# Patient Record
Sex: FEMALE | Race: WHITE | NOT HISPANIC OR LATINO | URBAN - METROPOLITAN AREA
[De-identification: names, ages, dates, MRNs, and addresses within clinical notes are randomized per-mention and may not be internally consistent; named-entity substitution may affect disease eponyms.]

---

## 2017-03-17 ENCOUNTER — INPATIENT (INPATIENT)
Facility: HOSPITAL | Age: 82
LOS: 4 days | Discharge: HOME | DRG: 552 | End: 2017-03-22
Attending: INTERNAL MEDICINE | Admitting: INTERNAL MEDICINE
Payer: COMMERCIAL

## 2017-03-17 VITALS
TEMPERATURE: 99 F | HEIGHT: 62 IN | HEART RATE: 73 BPM | WEIGHT: 173.94 LBS | SYSTOLIC BLOOD PRESSURE: 165 MMHG | OXYGEN SATURATION: 98 % | DIASTOLIC BLOOD PRESSURE: 42 MMHG | RESPIRATION RATE: 20 BRPM

## 2017-03-17 DIAGNOSIS — S72.009A FRACTURE OF UNSPECIFIED PART OF NECK OF UNSPECIFIED FEMUR, INITIAL ENCOUNTER FOR CLOSED FRACTURE: Chronic | ICD-10-CM

## 2017-03-17 RX ORDER — ACETAMINOPHEN WITH CODEINE 300MG-30MG
1 TABLET ORAL ONCE
Qty: 0 | Refills: 0 | Status: DISCONTINUED | OUTPATIENT
Start: 2017-03-17 | End: 2017-03-17

## 2017-03-17 RX ORDER — SODIUM CHLORIDE 9 MG/ML
3 INJECTION INTRAMUSCULAR; INTRAVENOUS; SUBCUTANEOUS EVERY 8 HOURS
Qty: 0 | Refills: 0 | Status: DISCONTINUED | OUTPATIENT
Start: 2017-03-17 | End: 2017-03-22

## 2017-03-17 RX ADMIN — SODIUM CHLORIDE 3 MILLILITER(S): 9 INJECTION INTRAMUSCULAR; INTRAVENOUS; SUBCUTANEOUS at 23:51

## 2017-03-17 RX ADMIN — Medication 1 TABLET(S): at 23:51

## 2017-03-17 NOTE — ED PROVIDER NOTE - MEDICAL DECISION MAKING DETAILS
Pt unable to ambulate safely due to back pain. Pt is fall risk. MAR endorsed. Pt and daughter  agrees with admission. Dr. Delarosa was paged. I had a detailed discussion with the patient and/or guardian regarding the historical points, exam findings, and any diagnostic results supporting the admit diagnosis.

## 2017-03-17 NOTE — ED PROVIDER NOTE - OBJECTIVE STATEMENT
Pt with daughter Ammy [place]an  Pt refused , requested  daughter as .   Pt has mechanicla fall 3 weeks ago down 1 step. no head trauma, no LOC.   +LBP with radiation to right leg. Pt is s/p lumbar sx 3 years ago. No urinary or bowel incontinence or retention.   Daughter states pt unable to safely ambulate. Pt with daughter Ammy Georgian  Pt refused , requested  daughter as .   Pt has mechanical fall 3 weeks ago down 1 step. no head trauma, no LOC.   +LBP with radiation to right leg. Pt is s/p lumbar sx 3 years ago. No urinary or bowel incontinence or retention.   Daughter states pt unable to safely ambulate.

## 2017-03-17 NOTE — ED PROVIDER NOTE - PHYSICAL EXAMINATION
+Right lower lumbar area paraspinal muscle tenseness and tenderness radiating to buttock and lateral calf area,,no cervical, thoracic or lumbar midline tenderness or bony deformities. No saddle anesthesia, B/L knee, pedal and EHL flex and ext intact, normal gait, no foot drop. Truncal flexion and extension intact.

## 2017-03-17 NOTE — ED PROVIDER NOTE - PMH
Anemia    Arthritis    Bunion of Great Toe; and lateral Right foot/ deformity Right 4th toe    Foot Callus  Right foot surgery 2008  Hyperlipidemia    Hypertension    Neuropathy    Renal cyst

## 2017-03-18 DIAGNOSIS — R26.2 DIFFICULTY IN WALKING, NOT ELSEWHERE CLASSIFIED: ICD-10-CM

## 2017-03-18 DIAGNOSIS — E78.5 HYPERLIPIDEMIA, UNSPECIFIED: ICD-10-CM

## 2017-03-18 DIAGNOSIS — I10 ESSENTIAL (PRIMARY) HYPERTENSION: ICD-10-CM

## 2017-03-18 DIAGNOSIS — D64.9 ANEMIA, UNSPECIFIED: ICD-10-CM

## 2017-03-18 LAB
24R-OH-CALCIDIOL SERPL-MCNC: 25.6 NG/ML — LOW (ref 30–100)
ANION GAP SERPL CALC-SCNC: 7 MMOL/L — SIGNIFICANT CHANGE UP (ref 5–17)
ANION GAP SERPL CALC-SCNC: 8 MMOL/L — SIGNIFICANT CHANGE UP (ref 5–17)
APPEARANCE UR: CLEAR — SIGNIFICANT CHANGE UP
BASOPHILS # BLD AUTO: 0 K/UL — SIGNIFICANT CHANGE UP (ref 0–0.2)
BASOPHILS # BLD AUTO: 0.1 K/UL — SIGNIFICANT CHANGE UP (ref 0–0.2)
BASOPHILS NFR BLD AUTO: 0.8 % — SIGNIFICANT CHANGE UP (ref 0–2)
BASOPHILS NFR BLD AUTO: 0.9 % — SIGNIFICANT CHANGE UP (ref 0–2)
BILIRUB UR-MCNC: NEGATIVE — SIGNIFICANT CHANGE UP
BUN SERPL-MCNC: 30 MG/DL — HIGH (ref 7–18)
BUN SERPL-MCNC: 32 MG/DL — HIGH (ref 7–18)
CALCIUM SERPL-MCNC: 8.8 MG/DL — SIGNIFICANT CHANGE UP (ref 8.4–10.5)
CALCIUM SERPL-MCNC: 8.8 MG/DL — SIGNIFICANT CHANGE UP (ref 8.4–10.5)
CHLORIDE SERPL-SCNC: 109 MMOL/L — HIGH (ref 96–108)
CHLORIDE SERPL-SCNC: 109 MMOL/L — HIGH (ref 96–108)
CHOLEST SERPL-MCNC: 171 MG/DL — SIGNIFICANT CHANGE UP (ref 10–199)
CK SERPL-CCNC: 54 U/L — SIGNIFICANT CHANGE UP (ref 21–215)
CO2 SERPL-SCNC: 25 MMOL/L — SIGNIFICANT CHANGE UP (ref 22–31)
CO2 SERPL-SCNC: 26 MMOL/L — SIGNIFICANT CHANGE UP (ref 22–31)
COLOR SPEC: YELLOW — SIGNIFICANT CHANGE UP
CREAT ?TM UR-MCNC: 106 MG/DL — SIGNIFICANT CHANGE UP
CREAT SERPL-MCNC: 1.16 MG/DL — SIGNIFICANT CHANGE UP (ref 0.5–1.3)
CREAT SERPL-MCNC: 1.35 MG/DL — HIGH (ref 0.5–1.3)
DIFF PNL FLD: NEGATIVE — SIGNIFICANT CHANGE UP
EOSINOPHIL # BLD AUTO: 0.2 K/UL — SIGNIFICANT CHANGE UP (ref 0–0.5)
EOSINOPHIL # BLD AUTO: 0.2 K/UL — SIGNIFICANT CHANGE UP (ref 0–0.5)
EOSINOPHIL NFR BLD AUTO: 3.5 % — SIGNIFICANT CHANGE UP (ref 0–6)
EOSINOPHIL NFR BLD AUTO: 3.7 % — SIGNIFICANT CHANGE UP (ref 0–6)
FERRITIN SERPL-MCNC: 111.8 NG/ML — SIGNIFICANT CHANGE UP (ref 15–150)
FOLATE SERPL-MCNC: >20 NG/ML — SIGNIFICANT CHANGE UP (ref 4.8–24.2)
GLUCOSE SERPL-MCNC: 101 MG/DL — HIGH (ref 70–99)
GLUCOSE SERPL-MCNC: 89 MG/DL — SIGNIFICANT CHANGE UP (ref 70–99)
GLUCOSE UR QL: NEGATIVE — SIGNIFICANT CHANGE UP
HCT VFR BLD CALC: 25.3 % — LOW (ref 34.5–45)
HCT VFR BLD CALC: 26.8 % — LOW (ref 34.5–45)
HDLC SERPL-MCNC: 54 MG/DL — SIGNIFICANT CHANGE UP (ref 40–125)
HGB BLD-MCNC: 8.4 G/DL — LOW (ref 11.5–15.5)
HGB BLD-MCNC: 8.5 G/DL — LOW (ref 11.5–15.5)
IRON SATN MFR SERPL: 10 % — LOW (ref 15–50)
IRON SATN MFR SERPL: 25 UG/DL — LOW (ref 40–150)
KETONES UR-MCNC: NEGATIVE — SIGNIFICANT CHANGE UP
LEUKOCYTE ESTERASE UR-ACNC: NEGATIVE — SIGNIFICANT CHANGE UP
LIPID PNL WITH DIRECT LDL SERPL: 87 MG/DL — SIGNIFICANT CHANGE UP
LYMPHOCYTES # BLD AUTO: 1.2 K/UL — SIGNIFICANT CHANGE UP (ref 1–3.3)
LYMPHOCYTES # BLD AUTO: 1.3 K/UL — SIGNIFICANT CHANGE UP (ref 1–3.3)
LYMPHOCYTES # BLD AUTO: 19.7 % — SIGNIFICANT CHANGE UP (ref 13–44)
LYMPHOCYTES # BLD AUTO: 25.1 % — SIGNIFICANT CHANGE UP (ref 13–44)
MAGNESIUM SERPL-MCNC: 2.4 MG/DL — SIGNIFICANT CHANGE UP (ref 1.8–2.4)
MCHC RBC-ENTMCNC: 31.5 PG — SIGNIFICANT CHANGE UP (ref 27–34)
MCHC RBC-ENTMCNC: 32 GM/DL — SIGNIFICANT CHANGE UP (ref 32–36)
MCHC RBC-ENTMCNC: 32.1 PG — SIGNIFICANT CHANGE UP (ref 27–34)
MCHC RBC-ENTMCNC: 33 GM/DL — SIGNIFICANT CHANGE UP (ref 32–36)
MCV RBC AUTO: 97.3 FL — SIGNIFICANT CHANGE UP (ref 80–100)
MCV RBC AUTO: 98.6 FL — SIGNIFICANT CHANGE UP (ref 80–100)
MONOCYTES # BLD AUTO: 0.7 K/UL — SIGNIFICANT CHANGE UP (ref 0–0.9)
MONOCYTES # BLD AUTO: 0.7 K/UL — SIGNIFICANT CHANGE UP (ref 0–0.9)
MONOCYTES NFR BLD AUTO: 12.3 % — SIGNIFICANT CHANGE UP (ref 2–14)
MONOCYTES NFR BLD AUTO: 13.6 % — SIGNIFICANT CHANGE UP (ref 2–14)
NEUTROPHILS # BLD AUTO: 2.9 K/UL — SIGNIFICANT CHANGE UP (ref 1.8–7.4)
NEUTROPHILS # BLD AUTO: 3.8 K/UL — SIGNIFICANT CHANGE UP (ref 1.8–7.4)
NEUTROPHILS NFR BLD AUTO: 56.8 % — SIGNIFICANT CHANGE UP (ref 43–77)
NEUTROPHILS NFR BLD AUTO: 63.6 % — SIGNIFICANT CHANGE UP (ref 43–77)
NITRITE UR-MCNC: NEGATIVE — SIGNIFICANT CHANGE UP
PH UR: 7 — SIGNIFICANT CHANGE UP (ref 4.8–8)
PHOSPHATE SERPL-MCNC: 3.4 MG/DL — SIGNIFICANT CHANGE UP (ref 2.5–4.5)
PLATELET # BLD AUTO: 175 K/UL — SIGNIFICANT CHANGE UP (ref 150–400)
PLATELET # BLD AUTO: 200 K/UL — SIGNIFICANT CHANGE UP (ref 150–400)
POTASSIUM SERPL-MCNC: 4.1 MMOL/L — SIGNIFICANT CHANGE UP (ref 3.5–5.3)
POTASSIUM SERPL-MCNC: 4.3 MMOL/L — SIGNIFICANT CHANGE UP (ref 3.5–5.3)
POTASSIUM SERPL-SCNC: 4.1 MMOL/L — SIGNIFICANT CHANGE UP (ref 3.5–5.3)
POTASSIUM SERPL-SCNC: 4.3 MMOL/L — SIGNIFICANT CHANGE UP (ref 3.5–5.3)
PROT ?TM UR-MCNC: 17 MG/DL — HIGH (ref 0–12)
PROT UR-MCNC: NEGATIVE — SIGNIFICANT CHANGE UP
RBC # BLD: 2.58 M/UL — LOW (ref 3.8–5.2)
RBC # BLD: 2.6 M/UL — LOW (ref 3.8–5.2)
RBC # BLD: 2.72 M/UL — LOW (ref 3.8–5.2)
RBC # FLD: 12.3 % — SIGNIFICANT CHANGE UP (ref 10.3–14.5)
RBC # FLD: 12.4 % — SIGNIFICANT CHANGE UP (ref 10.3–14.5)
RETICS #: 53.7 K/UL — SIGNIFICANT CHANGE UP (ref 25–125)
RETICS/RBC NFR: 2.1 % — SIGNIFICANT CHANGE UP (ref 0.5–2.5)
SODIUM SERPL-SCNC: 141 MMOL/L — SIGNIFICANT CHANGE UP (ref 135–145)
SODIUM SERPL-SCNC: 143 MMOL/L — SIGNIFICANT CHANGE UP (ref 135–145)
SODIUM UR-SCNC: 80 MMOL/L — SIGNIFICANT CHANGE UP (ref 40–220)
SP GR SPEC: 1.01 — SIGNIFICANT CHANGE UP (ref 1.01–1.02)
TIBC SERPL-MCNC: 261 UG/DL — SIGNIFICANT CHANGE UP (ref 250–450)
TOTAL CHOLESTEROL/HDL RATIO MEASUREMENT: 3.2 RATIO — LOW (ref 3.3–7.1)
TRANSFERRIN SERPL-MCNC: 188 MG/DL — LOW (ref 200–360)
TRIGL SERPL-MCNC: 149 MG/DL — SIGNIFICANT CHANGE UP (ref 10–149)
UIBC SERPL-MCNC: 236 UG/DL — SIGNIFICANT CHANGE UP (ref 110–370)
UROBILINOGEN FLD QL: NEGATIVE — SIGNIFICANT CHANGE UP
VIT B12 SERPL-MCNC: 483 PG/ML — SIGNIFICANT CHANGE UP (ref 243–894)
WBC # BLD: 5.1 K/UL — SIGNIFICANT CHANGE UP (ref 3.8–10.5)
WBC # BLD: 6 K/UL — SIGNIFICANT CHANGE UP (ref 3.8–10.5)
WBC # FLD AUTO: 5.1 K/UL — SIGNIFICANT CHANGE UP (ref 3.8–10.5)
WBC # FLD AUTO: 6 K/UL — SIGNIFICANT CHANGE UP (ref 3.8–10.5)

## 2017-03-18 PROCEDURE — 72100 X-RAY EXAM L-S SPINE 2/3 VWS: CPT | Mod: 26

## 2017-03-18 PROCEDURE — 73521 X-RAY EXAM HIPS BI 2 VIEWS: CPT | Mod: 26

## 2017-03-18 PROCEDURE — 99285 EMERGENCY DEPT VISIT HI MDM: CPT

## 2017-03-18 PROCEDURE — 71010: CPT | Mod: 26

## 2017-03-18 RX ORDER — ACETAMINOPHEN WITH CODEINE 300MG-30MG
1 TABLET ORAL EVERY 4 HOURS
Qty: 0 | Refills: 0 | Status: DISCONTINUED | OUTPATIENT
Start: 2017-03-18 | End: 2017-03-21

## 2017-03-18 RX ORDER — ASPIRIN/CALCIUM CARB/MAGNESIUM 324 MG
81 TABLET ORAL DAILY
Qty: 0 | Refills: 0 | Status: DISCONTINUED | OUTPATIENT
Start: 2017-03-18 | End: 2017-03-22

## 2017-03-18 RX ORDER — FERROUS SULFATE 325(65) MG
325 TABLET ORAL DAILY
Qty: 0 | Refills: 0 | Status: DISCONTINUED | OUTPATIENT
Start: 2017-03-18 | End: 2017-03-22

## 2017-03-18 RX ORDER — TRAMADOL HYDROCHLORIDE 50 MG/1
50 TABLET ORAL THREE TIMES A DAY
Qty: 0 | Refills: 0 | Status: DISCONTINUED | OUTPATIENT
Start: 2017-03-18 | End: 2017-03-22

## 2017-03-18 RX ORDER — HEPARIN SODIUM 5000 [USP'U]/ML
5000 INJECTION INTRAVENOUS; SUBCUTANEOUS EVERY 12 HOURS
Qty: 0 | Refills: 0 | Status: DISCONTINUED | OUTPATIENT
Start: 2017-03-18 | End: 2017-03-22

## 2017-03-18 RX ORDER — TRAMADOL HYDROCHLORIDE 50 MG/1
50 TABLET ORAL THREE TIMES A DAY
Qty: 0 | Refills: 0 | Status: DISCONTINUED | OUTPATIENT
Start: 2017-03-18 | End: 2017-03-18

## 2017-03-18 RX ORDER — SODIUM CHLORIDE 9 MG/ML
1000 INJECTION, SOLUTION INTRAVENOUS
Qty: 0 | Refills: 0 | Status: DISCONTINUED | OUTPATIENT
Start: 2017-03-18 | End: 2017-03-18

## 2017-03-18 RX ORDER — SODIUM CHLORIDE 9 MG/ML
1000 INJECTION, SOLUTION INTRAVENOUS
Qty: 0 | Refills: 0 | Status: DISCONTINUED | OUTPATIENT
Start: 2017-03-18 | End: 2017-03-22

## 2017-03-18 RX ORDER — METOPROLOL TARTRATE 50 MG
25 TABLET ORAL
Qty: 0 | Refills: 0 | Status: DISCONTINUED | OUTPATIENT
Start: 2017-03-18 | End: 2017-03-22

## 2017-03-18 RX ADMIN — SODIUM CHLORIDE 3 MILLILITER(S): 9 INJECTION INTRAMUSCULAR; INTRAVENOUS; SUBCUTANEOUS at 07:00

## 2017-03-18 RX ADMIN — Medication 1 TABLET(S): at 07:44

## 2017-03-18 RX ADMIN — Medication 325 MILLIGRAM(S): at 12:06

## 2017-03-18 RX ADMIN — SODIUM CHLORIDE 3 MILLILITER(S): 9 INJECTION INTRAMUSCULAR; INTRAVENOUS; SUBCUTANEOUS at 21:59

## 2017-03-18 RX ADMIN — HEPARIN SODIUM 5000 UNIT(S): 5000 INJECTION INTRAVENOUS; SUBCUTANEOUS at 07:45

## 2017-03-18 RX ADMIN — Medication 81 MILLIGRAM(S): at 12:06

## 2017-03-18 RX ADMIN — HEPARIN SODIUM 5000 UNIT(S): 5000 INJECTION INTRAVENOUS; SUBCUTANEOUS at 18:20

## 2017-03-18 RX ADMIN — Medication 25 MILLIGRAM(S): at 07:44

## 2017-03-18 RX ADMIN — Medication 1 TABLET(S): at 00:44

## 2017-03-18 RX ADMIN — SODIUM CHLORIDE 3 MILLILITER(S): 9 INJECTION INTRAMUSCULAR; INTRAVENOUS; SUBCUTANEOUS at 14:09

## 2017-03-18 NOTE — H&P ADULT. - PROBLEM SELECTOR PLAN 3
pt claims she is on a statin, but does not recall which one  will give lipitor 10mg for now  f/u lipid profile  obtain appropriate med rec

## 2017-03-18 NOTE — H&P ADULT. - PROBLEM SELECTOR PLAN 1
2/2 mechanical fall. no fracture appreciated on preliminary read of xray. f/u final read  c/w pain control, DVT prophylaxis.  f/u b12, vitamin D, CK  PT eval

## 2017-03-18 NOTE — H&P ADULT. - ASSESSMENT
86 F with hx of HTN, HLD presents s/p mechanical fall and c/o ambulatory dysfunction with b/l buttock pain admitted for inability to ambulate

## 2017-03-18 NOTE — H&P ADULT. - PROBLEM SELECTOR PLAN 2
c/w metoprolol for now from hold med rec  pt does not recall home meds or Rx  please followup with Rx for med rec  holding ARB given RAMSEY  holding home lasix as well for now

## 2017-03-18 NOTE — H&P ADULT. - PROBLEM SELECTOR PLAN 4
c/w ferrous sulfate  pt with chronic anemia of note with stable vitals and no signs of active bleed  f/u panel

## 2017-03-18 NOTE — H&P ADULT. - RS GEN PE MLT RESP DETAILS PC
clear to auscultation bilaterally/respirations non-labored/airway patent/breath sounds equal/good air movement

## 2017-03-18 NOTE — H&P ADULT. - HISTORY OF PRESENT ILLNESS
86 F from home, ambulates with walker at baseline PMH HLD, HTN, anemia, p/w c/o buttock pain s/p mechanical fall 2 weeks ago. Pt denies any preceding symptomology and ROS is otherwise negative except for b/l buttock throbbing/sharp pain, difficulty with ambulation and inability to sit without pain. Pt denies paresthesias, bowel/bladder incontinence, head trauma. In ED, pt is well-appearing, with stable vitals, SLR is negative, XR preliminary read does not appear to show fracture.

## 2017-03-18 NOTE — H&P ADULT. - MUSCULOSKELETAL
details… detailed exam no joint erythema/no joint warmth/normal strength/ROM intact/no calf tenderness/no joint swelling

## 2017-03-19 LAB
ANION GAP SERPL CALC-SCNC: 7 MMOL/L — SIGNIFICANT CHANGE UP (ref 5–17)
BASOPHILS # BLD AUTO: 0 K/UL — SIGNIFICANT CHANGE UP (ref 0–0.2)
BASOPHILS NFR BLD AUTO: 0.5 % — SIGNIFICANT CHANGE UP (ref 0–2)
BUN SERPL-MCNC: 22 MG/DL — HIGH (ref 7–18)
CALCIUM SERPL-MCNC: 9 MG/DL — SIGNIFICANT CHANGE UP (ref 8.4–10.5)
CHLORIDE SERPL-SCNC: 109 MMOL/L — HIGH (ref 96–108)
CO2 SERPL-SCNC: 27 MMOL/L — SIGNIFICANT CHANGE UP (ref 22–31)
CREAT SERPL-MCNC: 1.07 MG/DL — SIGNIFICANT CHANGE UP (ref 0.5–1.3)
EOSINOPHIL # BLD AUTO: 0.2 K/UL — SIGNIFICANT CHANGE UP (ref 0–0.5)
EOSINOPHIL NFR BLD AUTO: 3.3 % — SIGNIFICANT CHANGE UP (ref 0–6)
GLUCOSE SERPL-MCNC: 100 MG/DL — HIGH (ref 70–99)
HCT VFR BLD CALC: 26.2 % — LOW (ref 34.5–45)
HGB BLD-MCNC: 8.6 G/DL — LOW (ref 11.5–15.5)
LYMPHOCYTES # BLD AUTO: 1 K/UL — SIGNIFICANT CHANGE UP (ref 1–3.3)
LYMPHOCYTES # BLD AUTO: 16.9 % — SIGNIFICANT CHANGE UP (ref 13–44)
MCHC RBC-ENTMCNC: 31.6 PG — SIGNIFICANT CHANGE UP (ref 27–34)
MCHC RBC-ENTMCNC: 32.8 GM/DL — SIGNIFICANT CHANGE UP (ref 32–36)
MCV RBC AUTO: 96.4 FL — SIGNIFICANT CHANGE UP (ref 80–100)
MONOCYTES # BLD AUTO: 0.6 K/UL — SIGNIFICANT CHANGE UP (ref 0–0.9)
MONOCYTES NFR BLD AUTO: 10.2 % — SIGNIFICANT CHANGE UP (ref 2–14)
NEUTROPHILS # BLD AUTO: 4 K/UL — SIGNIFICANT CHANGE UP (ref 1.8–7.4)
NEUTROPHILS NFR BLD AUTO: 69 % — SIGNIFICANT CHANGE UP (ref 43–77)
PLATELET # BLD AUTO: 226 K/UL — SIGNIFICANT CHANGE UP (ref 150–400)
POTASSIUM SERPL-MCNC: 4.5 MMOL/L — SIGNIFICANT CHANGE UP (ref 3.5–5.3)
POTASSIUM SERPL-SCNC: 4.5 MMOL/L — SIGNIFICANT CHANGE UP (ref 3.5–5.3)
RBC # BLD: 2.72 M/UL — LOW (ref 3.8–5.2)
RBC # FLD: 12.2 % — SIGNIFICANT CHANGE UP (ref 10.3–14.5)
SODIUM SERPL-SCNC: 143 MMOL/L — SIGNIFICANT CHANGE UP (ref 135–145)
WBC # BLD: 5.9 K/UL — SIGNIFICANT CHANGE UP (ref 3.8–10.5)
WBC # FLD AUTO: 5.9 K/UL — SIGNIFICANT CHANGE UP (ref 3.8–10.5)

## 2017-03-19 RX ORDER — DOCUSATE SODIUM 100 MG
100 CAPSULE ORAL
Qty: 0 | Refills: 0 | Status: DISCONTINUED | OUTPATIENT
Start: 2017-03-19 | End: 2017-03-22

## 2017-03-19 RX ORDER — SENNA PLUS 8.6 MG/1
2 TABLET ORAL AT BEDTIME
Qty: 0 | Refills: 0 | Status: DISCONTINUED | OUTPATIENT
Start: 2017-03-19 | End: 2017-03-22

## 2017-03-19 RX ORDER — METOPROLOL TARTRATE 50 MG
12.5 TABLET ORAL ONCE
Qty: 0 | Refills: 0 | Status: COMPLETED | OUTPATIENT
Start: 2017-03-19 | End: 2017-03-19

## 2017-03-19 RX ADMIN — Medication 1 TABLET(S): at 17:32

## 2017-03-19 RX ADMIN — Medication 25 MILLIGRAM(S): at 05:20

## 2017-03-19 RX ADMIN — Medication 25 MILLIGRAM(S): at 17:32

## 2017-03-19 RX ADMIN — SODIUM CHLORIDE 3 MILLILITER(S): 9 INJECTION INTRAMUSCULAR; INTRAVENOUS; SUBCUTANEOUS at 14:34

## 2017-03-19 RX ADMIN — Medication 1 TABLET(S): at 05:27

## 2017-03-19 RX ADMIN — Medication 1 TABLET(S): at 11:46

## 2017-03-19 RX ADMIN — Medication 1 TABLET(S): at 05:19

## 2017-03-19 RX ADMIN — Medication 25 MILLIGRAM(S): at 07:05

## 2017-03-19 RX ADMIN — Medication 12.5 MILLIGRAM(S): at 21:39

## 2017-03-19 RX ADMIN — SODIUM CHLORIDE 3 MILLILITER(S): 9 INJECTION INTRAMUSCULAR; INTRAVENOUS; SUBCUTANEOUS at 21:39

## 2017-03-19 RX ADMIN — SODIUM CHLORIDE 3 MILLILITER(S): 9 INJECTION INTRAMUSCULAR; INTRAVENOUS; SUBCUTANEOUS at 05:20

## 2017-03-19 RX ADMIN — Medication 81 MILLIGRAM(S): at 11:44

## 2017-03-19 RX ADMIN — SENNA PLUS 2 TABLET(S): 8.6 TABLET ORAL at 21:39

## 2017-03-19 RX ADMIN — Medication 100 MILLIGRAM(S): at 17:32

## 2017-03-19 RX ADMIN — HEPARIN SODIUM 5000 UNIT(S): 5000 INJECTION INTRAVENOUS; SUBCUTANEOUS at 05:19

## 2017-03-19 RX ADMIN — Medication 1 TABLET(S): at 07:04

## 2017-03-19 RX ADMIN — Medication 1 TABLET(S): at 21:00

## 2017-03-19 RX ADMIN — HEPARIN SODIUM 5000 UNIT(S): 5000 INJECTION INTRAVENOUS; SUBCUTANEOUS at 17:33

## 2017-03-19 RX ADMIN — Medication 1 TABLET(S): at 19:59

## 2017-03-19 RX ADMIN — Medication 1 TABLET(S): at 07:10

## 2017-03-19 RX ADMIN — Medication 325 MILLIGRAM(S): at 11:44

## 2017-03-19 RX ADMIN — Medication 1 TABLET(S): at 12:45

## 2017-03-19 NOTE — PHYSICAL THERAPY INITIAL EVALUATION ADULT - ADDITIONAL COMMENTS
Patient lives alone in private home.  Must negotiate 12-14 steps to enter home.  Patient ambulates independently with a rolling walker and was independent with all ADLs.

## 2017-03-20 LAB
BASOPHILS # BLD AUTO: 0 K/UL — SIGNIFICANT CHANGE UP (ref 0–0.2)
BASOPHILS NFR BLD AUTO: 0.6 % — SIGNIFICANT CHANGE UP (ref 0–2)
EOSINOPHIL # BLD AUTO: 0.2 K/UL — SIGNIFICANT CHANGE UP (ref 0–0.5)
EOSINOPHIL NFR BLD AUTO: 3.4 % — SIGNIFICANT CHANGE UP (ref 0–6)
HCT VFR BLD CALC: 24.3 % — LOW (ref 34.5–45)
HCT VFR BLD CALC: 25 % — LOW (ref 34.5–45)
HGB BLD-MCNC: 7.9 G/DL — LOW (ref 11.5–15.5)
HGB BLD-MCNC: 8.2 G/DL — LOW (ref 11.5–15.5)
LYMPHOCYTES # BLD AUTO: 1.2 K/UL — SIGNIFICANT CHANGE UP (ref 1–3.3)
LYMPHOCYTES # BLD AUTO: 19.8 % — SIGNIFICANT CHANGE UP (ref 13–44)
MCHC RBC-ENTMCNC: 31.6 PG — SIGNIFICANT CHANGE UP (ref 27–34)
MCHC RBC-ENTMCNC: 31.7 PG — SIGNIFICANT CHANGE UP (ref 27–34)
MCHC RBC-ENTMCNC: 32.6 GM/DL — SIGNIFICANT CHANGE UP (ref 32–36)
MCHC RBC-ENTMCNC: 32.6 GM/DL — SIGNIFICANT CHANGE UP (ref 32–36)
MCV RBC AUTO: 96.7 FL — SIGNIFICANT CHANGE UP (ref 80–100)
MCV RBC AUTO: 97.2 FL — SIGNIFICANT CHANGE UP (ref 80–100)
MONOCYTES # BLD AUTO: 0.7 K/UL — SIGNIFICANT CHANGE UP (ref 0–0.9)
MONOCYTES NFR BLD AUTO: 12.1 % — SIGNIFICANT CHANGE UP (ref 2–14)
NEUTROPHILS # BLD AUTO: 3.8 K/UL — SIGNIFICANT CHANGE UP (ref 1.8–7.4)
NEUTROPHILS NFR BLD AUTO: 64.1 % — SIGNIFICANT CHANGE UP (ref 43–77)
PLATELET # BLD AUTO: 195 K/UL — SIGNIFICANT CHANGE UP (ref 150–400)
PLATELET # BLD AUTO: 202 K/UL — SIGNIFICANT CHANGE UP (ref 150–400)
RBC # BLD: 2.51 M/UL — LOW (ref 3.8–5.2)
RBC # BLD: 2.57 M/UL — LOW (ref 3.8–5.2)
RBC # FLD: 12.4 % — SIGNIFICANT CHANGE UP (ref 10.3–14.5)
RBC # FLD: 12.6 % — SIGNIFICANT CHANGE UP (ref 10.3–14.5)
WBC # BLD: 5.9 K/UL — SIGNIFICANT CHANGE UP (ref 3.8–10.5)
WBC # BLD: 6.5 K/UL — SIGNIFICANT CHANGE UP (ref 3.8–10.5)
WBC # FLD AUTO: 5.9 K/UL — SIGNIFICANT CHANGE UP (ref 3.8–10.5)
WBC # FLD AUTO: 6.5 K/UL — SIGNIFICANT CHANGE UP (ref 3.8–10.5)

## 2017-03-20 RX ORDER — POLYETHYLENE GLYCOL 3350 17 G/17G
17 POWDER, FOR SOLUTION ORAL DAILY
Qty: 0 | Refills: 0 | Status: DISCONTINUED | OUTPATIENT
Start: 2017-03-21 | End: 2017-03-22

## 2017-03-20 RX ORDER — POLYETHYLENE GLYCOL 3350 17 G/17G
17 POWDER, FOR SOLUTION ORAL ONCE
Qty: 0 | Refills: 0 | Status: COMPLETED | OUTPATIENT
Start: 2017-03-20 | End: 2017-03-20

## 2017-03-20 RX ADMIN — Medication 100 MILLIGRAM(S): at 05:19

## 2017-03-20 RX ADMIN — Medication 1 TABLET(S): at 19:27

## 2017-03-20 RX ADMIN — Medication 1 TABLET(S): at 03:30

## 2017-03-20 RX ADMIN — Medication 100 MILLIGRAM(S): at 17:08

## 2017-03-20 RX ADMIN — Medication 1 TABLET(S): at 05:18

## 2017-03-20 RX ADMIN — Medication 1 TABLET(S): at 17:08

## 2017-03-20 RX ADMIN — SODIUM CHLORIDE 3 MILLILITER(S): 9 INJECTION INTRAMUSCULAR; INTRAVENOUS; SUBCUTANEOUS at 13:06

## 2017-03-20 RX ADMIN — HEPARIN SODIUM 5000 UNIT(S): 5000 INJECTION INTRAVENOUS; SUBCUTANEOUS at 05:19

## 2017-03-20 RX ADMIN — SODIUM CHLORIDE 3 MILLILITER(S): 9 INJECTION INTRAMUSCULAR; INTRAVENOUS; SUBCUTANEOUS at 05:19

## 2017-03-20 RX ADMIN — Medication 25 MILLIGRAM(S): at 17:08

## 2017-03-20 RX ADMIN — POLYETHYLENE GLYCOL 3350 17 GRAM(S): 17 POWDER, FOR SOLUTION ORAL at 10:21

## 2017-03-20 RX ADMIN — SODIUM CHLORIDE 3 MILLILITER(S): 9 INJECTION INTRAMUSCULAR; INTRAVENOUS; SUBCUTANEOUS at 21:32

## 2017-03-20 RX ADMIN — Medication 81 MILLIGRAM(S): at 12:03

## 2017-03-20 RX ADMIN — Medication 325 MILLIGRAM(S): at 12:03

## 2017-03-20 RX ADMIN — Medication 1 TABLET(S): at 02:59

## 2017-03-20 RX ADMIN — Medication 25 MILLIGRAM(S): at 05:18

## 2017-03-20 RX ADMIN — SENNA PLUS 2 TABLET(S): 8.6 TABLET ORAL at 21:33

## 2017-03-20 RX ADMIN — HEPARIN SODIUM 5000 UNIT(S): 5000 INJECTION INTRAVENOUS; SUBCUTANEOUS at 17:09

## 2017-03-20 RX ADMIN — Medication 1 TABLET(S): at 20:27

## 2017-03-20 NOTE — DISCHARGE NOTE ADULT - PATIENT PORTAL LINK FT
“You can access the FollowHealth Patient Portal, offered by [facility], by registering with the following website: [email or web address]”

## 2017-03-20 NOTE — DISCHARGE NOTE ADULT - MEDICATION SUMMARY - MEDICATIONS TO TAKE
I will START or STAY ON the medications listed below when I get home from the hospital:    aspirin 81 mg oral tablet, chewable  -- 1 tab(s) by mouth once a day  -- Indication: For Prophylaxis    traMADol 50 mg oral tablet  -- 1 tab(s) by mouth 3 times a day, As needed, Severe Pain (7 - 10)  -- Indication: For Pain    losartan  -- 50 milligram(s) by mouth once a day  -- Indication: For Hypertension    metoprolol tartrate 50 mg oral tablet  -- 1 tab(s) by mouth 2 times a day  -- Indication: For Hypertension    lidocaine 5% topical film  -- Apply on skin to affected area once a day. Keep on for 12 hours then take it off for 12 hours  -- Indication: For Pain    furosemide  -- 20 milligram(s) by mouth once a day  -- Indication: For Hypertension    ferrous sulfate 325 mg oral delayed release tablet  -- 1 tab(s) by mouth once a day  -- Indication: For Anemia    docusate sodium 100 mg oral capsule  -- 1 cap(s) by mouth 2 times a day  -- Indication: For constipation    polyethylene glycol 3350 oral powder for reconstitution  -- 17 gram(s) by mouth once a day, As needed, Constipation  -- Indication: For constipstion    senna oral tablet  -- 2 tab(s) by mouth once a day (at bedtime)  -- Indication: For constipation    calcium (as carbonate)-vitamin D 250 mg-125 intl units oral tablet  -- 1 tab(s) by mouth 2 times a day  -- Indication: For Prophylaxis I will START or STAY ON the medications listed below when I get home from the hospital:    aspirin 81 mg oral tablet, chewable  -- 1 tab(s) by mouth once a day  -- Indication: For Prophylaxis    traMADol 50 mg oral tablet  -- 1 tab(s) by mouth 3 times a day, As needed, Severe Pain (7 - 10)  -- Indication: For Pain    CeleBREX 200 mg oral capsule  -- 1 cap(s) by mouth once a day  -- Indication: For Pain    losartan  -- 50 milligram(s) by mouth once a day  -- Indication: For Hypertension    metoprolol tartrate 50 mg oral tablet  -- 1 tab(s) by mouth 2 times a day  -- Indication: For Hypertension    furosemide  -- 20 milligram(s) by mouth once a day  -- Indication: For Hypertension    ferrous sulfate 325 mg oral delayed release tablet  -- 1 tab(s) by mouth once a day  -- Indication: For Anemia    docusate sodium 100 mg oral capsule  -- 1 cap(s) by mouth 2 times a day  -- Indication: For constipation    polyethylene glycol 3350 oral powder for reconstitution  -- 17 gram(s) by mouth once a day, As needed, Constipation  -- Indication: For constipstion    senna oral tablet  -- 2 tab(s) by mouth once a day (at bedtime)  -- Indication: For constipation    calcium (as carbonate)-vitamin D 250 mg-125 intl units oral tablet  -- 1 tab(s) by mouth 2 times a day  -- Indication: For Prophylaxis

## 2017-03-20 NOTE — GOALS OF CARE CONVERSATION - PERSONAL ADVANCE DIRECTIVE - CONVERSATION DETAILS
Discussed with patient/daughter goals of care as well as MOLST.  Daughter and mother with discuss together this evening.

## 2017-03-20 NOTE — DISCHARGE NOTE ADULT - CARE PLAN
Principal Discharge DX:	Unable to ambulate  Secondary Diagnosis:	Hypertension  Secondary Diagnosis:	Back pain Principal Discharge DX:	Unable to ambulate  Goal:	pt will remain symptoms free  Instructions for follow-up, activity and diet:	Follow up with primary physician and neurologist in 1 week.  Pt needs outpatient neuropathy work -up with EMG/NCS  Secondary Diagnosis:	Pelvic fracture  Instructions for follow-up, activity and diet:	conservative management. Take pain medications  when needed  Secondary Diagnosis:	Fall  Secondary Diagnosis:	Arthritis  Secondary Diagnosis:	Anemia  Secondary Diagnosis:	Hypertension  Secondary Diagnosis:	Hyperlipidemia Principal Discharge DX:	Unable to ambulate  Goal:	pt will remain symptoms free  Instructions for follow-up, activity and diet:	Follow up with primary physician and neurologist in 1 week.  Pt needs outpatient neuropathy work -up with EMG/NCS  Secondary Diagnosis:	Pelvic fracture  Instructions for follow-up, activity and diet:	conservative management. Take pain medications  when needed  Secondary Diagnosis:	Fall  Instructions for follow-up, activity and diet:	maintain fall precautions  Secondary Diagnosis:	Arthritis  Instructions for follow-up, activity and diet:	follow up with primary physician  Secondary Diagnosis:	Anemia  Instructions for follow-up, activity and diet:	follow up with primary physician. Repeat CBC  Secondary Diagnosis:	Hypertension  Instructions for follow-up, activity and diet:	continue medications and diet  Secondary Diagnosis:	Hyperlipidemia  Instructions for follow-up, activity and diet:	continue medications and diet

## 2017-03-20 NOTE — DISCHARGE NOTE ADULT - MEDICATION SUMMARY - MEDICATIONS TO STOP TAKING
I will STOP taking the medications listed below when I get home from the hospital:    CeleBREX  -- 200  by mouth once a day

## 2017-03-21 LAB — MYOGLOBIN UR-MCNC: 84 MCG/L — HIGH

## 2017-03-21 PROCEDURE — 72192 CT PELVIS W/O DYE: CPT | Mod: 26

## 2017-03-21 PROCEDURE — 99223 1ST HOSP IP/OBS HIGH 75: CPT

## 2017-03-21 RX ORDER — LIDOCAINE 4 G/100G
2 CREAM TOPICAL DAILY
Qty: 0 | Refills: 0 | Status: DISCONTINUED | OUTPATIENT
Start: 2017-03-21 | End: 2017-03-22

## 2017-03-21 RX ORDER — LACTULOSE 10 G/15ML
20 SOLUTION ORAL ONCE
Qty: 0 | Refills: 0 | Status: COMPLETED | OUTPATIENT
Start: 2017-03-21 | End: 2017-03-21

## 2017-03-21 RX ADMIN — Medication 25 MILLIGRAM(S): at 17:31

## 2017-03-21 RX ADMIN — HEPARIN SODIUM 5000 UNIT(S): 5000 INJECTION INTRAVENOUS; SUBCUTANEOUS at 17:31

## 2017-03-21 RX ADMIN — SODIUM CHLORIDE 3 MILLILITER(S): 9 INJECTION INTRAMUSCULAR; INTRAVENOUS; SUBCUTANEOUS at 13:19

## 2017-03-21 RX ADMIN — Medication 100 MILLIGRAM(S): at 17:31

## 2017-03-21 RX ADMIN — Medication 375 MILLIGRAM(S): at 17:31

## 2017-03-21 RX ADMIN — SODIUM CHLORIDE 3 MILLILITER(S): 9 INJECTION INTRAMUSCULAR; INTRAVENOUS; SUBCUTANEOUS at 23:25

## 2017-03-21 RX ADMIN — Medication 375 MILLIGRAM(S): at 18:30

## 2017-03-21 RX ADMIN — LACTULOSE 20 GRAM(S): 10 SOLUTION ORAL at 06:41

## 2017-03-21 RX ADMIN — Medication 1 TABLET(S): at 05:08

## 2017-03-21 RX ADMIN — HEPARIN SODIUM 5000 UNIT(S): 5000 INJECTION INTRAVENOUS; SUBCUTANEOUS at 05:29

## 2017-03-21 RX ADMIN — LIDOCAINE 2 PATCH: 4 CREAM TOPICAL at 11:36

## 2017-03-21 RX ADMIN — Medication 1 TABLET(S): at 17:31

## 2017-03-21 RX ADMIN — Medication 325 MILLIGRAM(S): at 11:36

## 2017-03-21 RX ADMIN — Medication 25 MILLIGRAM(S): at 05:29

## 2017-03-21 RX ADMIN — Medication 100 MILLIGRAM(S): at 05:29

## 2017-03-21 RX ADMIN — SODIUM CHLORIDE 3 MILLILITER(S): 9 INJECTION INTRAMUSCULAR; INTRAVENOUS; SUBCUTANEOUS at 05:28

## 2017-03-21 RX ADMIN — POLYETHYLENE GLYCOL 3350 17 GRAM(S): 17 POWDER, FOR SOLUTION ORAL at 06:41

## 2017-03-21 RX ADMIN — Medication 81 MILLIGRAM(S): at 11:36

## 2017-03-21 RX ADMIN — SENNA PLUS 2 TABLET(S): 8.6 TABLET ORAL at 23:25

## 2017-03-21 RX ADMIN — Medication 1 ENEMA: at 13:18

## 2017-03-21 RX ADMIN — Medication 1 TABLET(S): at 06:08

## 2017-03-21 RX ADMIN — LIDOCAINE 2 PATCH: 4 CREAM TOPICAL at 23:24

## 2017-03-21 RX ADMIN — Medication 1 TABLET(S): at 05:29

## 2017-03-22 VITALS — DIASTOLIC BLOOD PRESSURE: 52 MMHG | SYSTOLIC BLOOD PRESSURE: 141 MMHG | HEART RATE: 83 BPM

## 2017-03-22 PROCEDURE — 81003 URINALYSIS AUTO W/O SCOPE: CPT

## 2017-03-22 PROCEDURE — 84156 ASSAY OF PROTEIN URINE: CPT

## 2017-03-22 PROCEDURE — 82550 ASSAY OF CK (CPK): CPT

## 2017-03-22 PROCEDURE — 85045 AUTOMATED RETICULOCYTE COUNT: CPT

## 2017-03-22 PROCEDURE — 82728 ASSAY OF FERRITIN: CPT

## 2017-03-22 PROCEDURE — 80061 LIPID PANEL: CPT

## 2017-03-22 PROCEDURE — 73521 X-RAY EXAM HIPS BI 2 VIEWS: CPT

## 2017-03-22 PROCEDURE — 84100 ASSAY OF PHOSPHORUS: CPT

## 2017-03-22 PROCEDURE — 82570 ASSAY OF URINE CREATININE: CPT

## 2017-03-22 PROCEDURE — 99233 SBSQ HOSP IP/OBS HIGH 50: CPT

## 2017-03-22 PROCEDURE — 80048 BASIC METABOLIC PNL TOTAL CA: CPT

## 2017-03-22 PROCEDURE — 71045 X-RAY EXAM CHEST 1 VIEW: CPT

## 2017-03-22 PROCEDURE — 93005 ELECTROCARDIOGRAM TRACING: CPT

## 2017-03-22 PROCEDURE — 72192 CT PELVIS W/O DYE: CPT

## 2017-03-22 PROCEDURE — 82746 ASSAY OF FOLIC ACID SERUM: CPT

## 2017-03-22 PROCEDURE — 83874 ASSAY OF MYOGLOBIN: CPT

## 2017-03-22 PROCEDURE — 84300 ASSAY OF URINE SODIUM: CPT

## 2017-03-22 PROCEDURE — 83550 IRON BINDING TEST: CPT

## 2017-03-22 PROCEDURE — 72100 X-RAY EXAM L-S SPINE 2/3 VWS: CPT

## 2017-03-22 PROCEDURE — 82607 VITAMIN B-12: CPT

## 2017-03-22 PROCEDURE — 82306 VITAMIN D 25 HYDROXY: CPT

## 2017-03-22 PROCEDURE — 84466 ASSAY OF TRANSFERRIN: CPT

## 2017-03-22 PROCEDURE — 85027 COMPLETE CBC AUTOMATED: CPT

## 2017-03-22 PROCEDURE — 99285 EMERGENCY DEPT VISIT HI MDM: CPT | Mod: 25

## 2017-03-22 PROCEDURE — 80053 COMPREHEN METABOLIC PANEL: CPT

## 2017-03-22 PROCEDURE — 83735 ASSAY OF MAGNESIUM: CPT

## 2017-03-22 RX ORDER — DOCUSATE SODIUM 100 MG
1 CAPSULE ORAL
Qty: 0 | Refills: 0 | DISCHARGE
Start: 2017-03-22

## 2017-03-22 RX ORDER — ASPIRIN/CALCIUM CARB/MAGNESIUM 324 MG
1 TABLET ORAL
Qty: 0 | Refills: 0 | DISCHARGE
Start: 2017-03-22

## 2017-03-22 RX ORDER — TRAMADOL HYDROCHLORIDE 50 MG/1
1 TABLET ORAL
Qty: 0 | Refills: 0 | DISCHARGE
Start: 2017-03-22

## 2017-03-22 RX ORDER — POLYETHYLENE GLYCOL 3350 17 G/17G
17 POWDER, FOR SOLUTION ORAL
Qty: 0 | Refills: 0 | DISCHARGE
Start: 2017-03-22

## 2017-03-22 RX ORDER — LIDOCAINE 4 G/100G
2 CREAM TOPICAL
Qty: 30 | Refills: 0 | OUTPATIENT
Start: 2017-03-22 | End: 2017-04-21

## 2017-03-22 RX ORDER — TRAMADOL HYDROCHLORIDE 50 MG/1
1 TABLET ORAL
Qty: 0 | Refills: 0 | COMMUNITY
Start: 2017-03-22

## 2017-03-22 RX ORDER — METOPROLOL TARTRATE 50 MG
1 TABLET ORAL
Qty: 0 | Refills: 0 | COMMUNITY
Start: 2017-03-22

## 2017-03-22 RX ORDER — SENNA PLUS 8.6 MG/1
2 TABLET ORAL
Qty: 0 | Refills: 0 | DISCHARGE
Start: 2017-03-22

## 2017-03-22 RX ADMIN — SODIUM CHLORIDE 3 MILLILITER(S): 9 INJECTION INTRAMUSCULAR; INTRAVENOUS; SUBCUTANEOUS at 13:16

## 2017-03-22 RX ADMIN — Medication 325 MILLIGRAM(S): at 11:23

## 2017-03-22 RX ADMIN — Medication 81 MILLIGRAM(S): at 11:23

## 2017-03-22 RX ADMIN — Medication 1 TABLET(S): at 17:24

## 2017-03-22 RX ADMIN — Medication 375 MILLIGRAM(S): at 05:53

## 2017-03-22 RX ADMIN — Medication 25 MILLIGRAM(S): at 17:23

## 2017-03-22 RX ADMIN — LIDOCAINE 2 PATCH: 4 CREAM TOPICAL at 11:23

## 2017-03-22 RX ADMIN — Medication 1 TABLET(S): at 05:53

## 2017-03-22 RX ADMIN — Medication 375 MILLIGRAM(S): at 17:23

## 2017-03-22 RX ADMIN — HEPARIN SODIUM 5000 UNIT(S): 5000 INJECTION INTRAVENOUS; SUBCUTANEOUS at 05:53

## 2017-03-22 RX ADMIN — Medication 100 MILLIGRAM(S): at 17:24

## 2017-03-22 RX ADMIN — HEPARIN SODIUM 5000 UNIT(S): 5000 INJECTION INTRAVENOUS; SUBCUTANEOUS at 17:24

## 2017-03-22 RX ADMIN — Medication 100 MILLIGRAM(S): at 05:54

## 2017-03-22 RX ADMIN — Medication 375 MILLIGRAM(S): at 18:29

## 2017-03-22 RX ADMIN — Medication 25 MILLIGRAM(S): at 05:53

## 2017-03-22 RX ADMIN — Medication 375 MILLIGRAM(S): at 07:50

## 2017-03-22 RX ADMIN — SODIUM CHLORIDE 3 MILLILITER(S): 9 INJECTION INTRAMUSCULAR; INTRAVENOUS; SUBCUTANEOUS at 05:54
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
## 2017-03-27 DIAGNOSIS — I10 ESSENTIAL (PRIMARY) HYPERTENSION: ICD-10-CM

## 2017-03-27 DIAGNOSIS — E78.5 HYPERLIPIDEMIA, UNSPECIFIED: ICD-10-CM
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
## 2017-03-27 DIAGNOSIS — D64.9 ANEMIA, UNSPECIFIED: ICD-10-CM

## 2017-03-27 DIAGNOSIS — G62.9 POLYNEUROPATHY, UNSPECIFIED: ICD-10-CM

## 2017-03-27 DIAGNOSIS — M54.40 LUMBAGO WITH SCIATICA, UNSPECIFIED SIDE: ICD-10-CM

## 2017-03-27 DIAGNOSIS — K59.00 CONSTIPATION, UNSPECIFIED: ICD-10-CM

## 2018-10-16 ENCOUNTER — APPOINTMENT (OUTPATIENT)
Dept: ORTHOPEDIC SURGERY | Facility: CLINIC | Age: 83
End: 2018-10-16
Payer: MEDICARE

## 2018-10-16 VITALS
SYSTOLIC BLOOD PRESSURE: 180 MMHG | HEIGHT: 62 IN | DIASTOLIC BLOOD PRESSURE: 68 MMHG | BODY MASS INDEX: 31.83 KG/M2 | WEIGHT: 173 LBS | HEART RATE: 69 BPM

## 2018-10-16 DIAGNOSIS — Z87.39 PERSONAL HISTORY OF OTHER DISEASES OF THE MUSCULOSKELETAL SYSTEM AND CONNECTIVE TISSUE: ICD-10-CM

## 2018-10-16 DIAGNOSIS — M19.011 PRIMARY OSTEOARTHRITIS, RIGHT SHOULDER: ICD-10-CM

## 2018-10-16 DIAGNOSIS — Z78.9 OTHER SPECIFIED HEALTH STATUS: ICD-10-CM

## 2018-10-16 DIAGNOSIS — Z86.39 PERSONAL HISTORY OF OTHER ENDOCRINE, NUTRITIONAL AND METABOLIC DISEASE: ICD-10-CM

## 2018-10-16 DIAGNOSIS — Z60.2 PROBLEMS RELATED TO LIVING ALONE: ICD-10-CM

## 2018-10-16 DIAGNOSIS — M17.12 UNILATERAL PRIMARY OSTEOARTHRITIS, LEFT KNEE: ICD-10-CM

## 2018-10-16 DIAGNOSIS — S32.9XXA FRACTURE OF UNSPECIFIED PARTS OF LUMBOSACRAL SPINE AND PELVIS, INITIAL ENCOUNTER FOR CLOSED FRACTURE: ICD-10-CM

## 2018-10-16 DIAGNOSIS — M17.11 UNILATERAL PRIMARY OSTEOARTHRITIS, RIGHT KNEE: ICD-10-CM

## 2018-10-16 PROCEDURE — 72170 X-RAY EXAM OF PELVIS: CPT

## 2018-10-16 PROCEDURE — 73564 X-RAY EXAM KNEE 4 OR MORE: CPT | Mod: RT

## 2018-10-16 PROCEDURE — 99205 OFFICE O/P NEW HI 60 MIN: CPT | Mod: 25

## 2018-10-16 PROCEDURE — 20610 DRAIN/INJ JOINT/BURSA W/O US: CPT | Mod: RT

## 2018-10-16 PROCEDURE — 73030 X-RAY EXAM OF SHOULDER: CPT | Mod: RT

## 2018-10-16 SDOH — SOCIAL STABILITY - SOCIAL INSECURITY: PROBLEMS RELATED TO LIVING ALONE: Z60.2

## 2019-08-13 ENCOUNTER — EMERGENCY (EMERGENCY)
Facility: HOSPITAL | Age: 84
LOS: 1 days | Discharge: HOME | End: 2019-08-13
Attending: EMERGENCY MEDICINE
Payer: COMMERCIAL

## 2019-08-13 VITALS
SYSTOLIC BLOOD PRESSURE: 202 MMHG | HEIGHT: 64 IN | RESPIRATION RATE: 20 BRPM | DIASTOLIC BLOOD PRESSURE: 74 MMHG | OXYGEN SATURATION: 98 % | HEART RATE: 94 BPM | TEMPERATURE: 98 F | WEIGHT: 173.06 LBS

## 2019-08-13 VITALS
OXYGEN SATURATION: 99 % | TEMPERATURE: 98 F | HEART RATE: 74 BPM | SYSTOLIC BLOOD PRESSURE: 148 MMHG | RESPIRATION RATE: 18 BRPM | DIASTOLIC BLOOD PRESSURE: 60 MMHG

## 2019-08-13 DIAGNOSIS — S72.009A FRACTURE OF UNSPECIFIED PART OF NECK OF UNSPECIFIED FEMUR, INITIAL ENCOUNTER FOR CLOSED FRACTURE: Chronic | ICD-10-CM

## 2019-08-13 PROCEDURE — 99284 EMERGENCY DEPT VISIT MOD MDM: CPT

## 2019-08-13 PROCEDURE — 99284 EMERGENCY DEPT VISIT MOD MDM: CPT | Mod: 25

## 2019-08-13 PROCEDURE — 73562 X-RAY EXAM OF KNEE 3: CPT

## 2019-08-13 PROCEDURE — 73562 X-RAY EXAM OF KNEE 3: CPT | Mod: 26,RT

## 2019-08-13 PROCEDURE — 73552 X-RAY EXAM OF FEMUR 2/>: CPT

## 2019-08-13 PROCEDURE — 73552 X-RAY EXAM OF FEMUR 2/>: CPT | Mod: 26,RT

## 2019-08-13 PROCEDURE — 73502 X-RAY EXAM HIP UNI 2-3 VIEWS: CPT | Mod: 26,RT

## 2019-08-13 PROCEDURE — 96372 THER/PROPH/DIAG INJ SC/IM: CPT

## 2019-08-13 PROCEDURE — 73502 X-RAY EXAM HIP UNI 2-3 VIEWS: CPT

## 2019-08-13 RX ORDER — KETOROLAC TROMETHAMINE 30 MG/ML
30 SYRINGE (ML) INJECTION ONCE
Refills: 0 | Status: DISCONTINUED | OUTPATIENT
Start: 2019-08-13 | End: 2019-08-13

## 2019-08-13 RX ADMIN — Medication 30 MILLIGRAM(S): at 11:02

## 2019-08-13 RX ADMIN — Medication 30 MILLIGRAM(S): at 11:36

## 2019-08-13 NOTE — ED PROVIDER NOTE - CLINICAL SUMMARY MEDICAL DECISION MAKING FREE TEXT BOX
Character low suspicion for UTI/pyelo and no urinary symptoms or CVAT. No midline pain/tenderness or abnormality. No trauma. No signs of cord compression. No e/o fx on exam or Xray. Character very well localized and low suspicion for anemia or CKD/electrolyte cause. Pt with past h/o sciatica and had acute twist that worsened pain. Given Toradol and able to ambulate with her cane without other assistance. Patient is well appearing, NAD, afebrile, hemodynamically stable. Discharged with instructions in further symptomatic care, return precautions, and need for ortho f/u and given list for this.

## 2019-08-13 NOTE — ED PROVIDER NOTE - PHYSICAL EXAMINATION
Afebrile, hemodynamically stable, saturating well  NAD, well appearing  Head NCAT  EOMI grossly, anicteric  MMM  No JVD  RRR, nml S1/S2, no m/r/g  Lungs CTAB, no w/r/r  Abd soft, NT, ND, nml BS, no rebound or guarding  No midline TTP/stepoff/deformity. Pain localized and reproduced to palp of R buttock  AAO, CN's 3-12 grossly intact  TRIMBLE spontaneously, no leg cyanosis or edema  Skin warm, well perfused, no rashes or hives

## 2019-08-13 NOTE — ED PROVIDER NOTE - NSFOLLOWUPINSTRUCTIONS_ED_ALL_ED_FT
Please follow up with an orthopedist and her primary care doctor in 1-2 days.  Please continue physical therapy and celebrex.  Please return to the emergency department if she has worsening pain, lightheadedness, falls or concern for her safety, or any other symptoms.

## 2019-08-13 NOTE — ED PROVIDER NOTE - PSH
Gall Bladder Disease  Cholecystectomy  1994  Hip fracture  s/p surgery in june/2015  History of Cholecystectomy

## 2019-08-13 NOTE — ED ADULT NURSE NOTE - NS ED NOTE ABUSE SUSPICION NEGLECT YN
12/20/2023              Fidelia Tipton         Dear Dearl Corinna,    This letter is to inform you that our office has made several attempts to reach you by phone without success. We were attempting to contact you by phone regarding   Your call from 11/10/23  Please contact our office at the number listed below as soon as you receive this letter to discuss this issue and to make the necessary changes in our system to your contact information. Thank you for your cooperation.         Sincerely,    Brenton Duncan,   [facility], [facility], [facility]  [address] [place] [address] [id number] [phone] No

## 2019-08-13 NOTE — ED PROVIDER NOTE - OBJECTIVE STATEMENT
Hx translated by son. 88 y/o with PMHx of Anemia, HTN, HLD, Osteoporosis and PSHx of Hip Surgery, Cholecystectomy c/o worsening L hip pain that radiates down L leg x 15 days. Onset of pain was gradual in nature, but  worsened this morning due to slip with no fall this  morning. Pt reports getting out of bed this  morning at 6am, slipping, but not falling and  twisting the same area that her pain was currently occuring. Pts pain worsened and is in the same location. Pt relates associated sxs of occasional weakness and numbness. Pt has not taken any Motrin or Celebrex for sxs PTA, nor has she seen PMD for sxs. Pt has had PT visits since onset of sxs, without relief. Pt is currently in pain, but not experiencing numbness. Pt denies back pain, dysuria, hematuria, urinary retention, abdominal pain or any other acute complaints. Pt occasionally ambulates with walker. Pt currently taking Lasix, Asprin, Metoprolol, Statin use, Tylenol x 3, Celebrex. NKDA. Hx translated by son. 88 y/o with PMHx of Anemia, HTN, HLD, Osteoporosis and PSHx of Hip Surgery, Cholecystectomy c/o worsening L hip pain that radiates down L leg x 15 days. Pain is localized in R buttock and hip. Onset of pain was gradual in nature, but worsened this morning due to slip with no fall this morning. Pt reports getting out of bed this morning at 6am, slipping but not falling and twisting the same area that her pain was currently occurring. Pt's pain worsened and is in the same location. Pt has not taken any Motrin or Celebrex for sx's PTA, nor has she seen PMD for sx's, though has been seeing PT since onset of sxs with significant relief, and has also had h/o arthritis and sciatica of the R side. Pt denies back pain, dysuria, hematuria, urinary retention, abdominal pain, fever, actual weakness or numbness of extremity, or any other acute complaints. Pt occasionally ambulates with walker, cane otherwise. Pt currently taking Lasix, Asprin, Metoprolol, Statin use, Tylenol x 3, Celebrex. NKDA.

## 2019-08-13 NOTE — ED ADULT TRIAGE NOTE - CHIEF COMPLAINT QUOTE
Pain right hip/ leg x this am. Pt was getting out of bed and almost fell. As per son, pt heard a crack. Able to ambulate with cane.

## 2019-10-02 PROBLEM — Z60.2 PERSON LIVING ALONE: Status: ACTIVE | Noted: 2018-10-16

## 2020-09-22 NOTE — DISCHARGE NOTE ADULT - HOSPITAL COURSE
Chart review completed 2020.  Care Everywhere updates requested and reviewed.  Immunizations reconciled. Media reports reviewed.  Duplicate HM overrides and  orders removed.  Overdue HM topic chart audit and/or requested.  Overdue lab testing linked to upcoming lab appointments if applies.    Lab jeanie, and quest reviewed.    Message sent to patient's portal.        Health Maintenance Due   Topic Date Due    HIV Screening  1979    TETANUS VACCINE  1982    Shingles Vaccine (1 of 2) 2014    Colorectal Cancer Screening  2017    Mammogram  10/03/2018    Influenza Vaccine (1) 2020          86 F with hx of HTN, HLD presents s/p mechanical fall and c/o ambulatory dysfunction with b/l buttock pain.  Pt denied  any preceding symptomology and ROS was  otherwise negative except for b/l buttock throbbing/sharp pain, difficulty with ambulation and inability to sit without pain. Pt denied paresthesias, bowel/bladder incontinence, head trauma.     In ED, pt was well-appearing and SLR was negative.  VS were 98.8 F, 165/42, 73 bpm, 20 rr, 98% RA.    Patient was admitted to internal medicine for lower back pain and inability and/or difficulty ambulating.      Unable to ambulate, s/p mechanical fall.  X-ray of bilateral pelvis/back negative.    Vit D level mildly decreased and pt started on Oscal with Vit D  Dr. Delarosa, attending, ordered neuro and pain management consults  for lower back pain.  PT recommended home assistance along with home PT.  Dr. Varela, neurologist, evaluated  JEF Bowden NP, pain management evaluated    Constipation  + BS all four quads/passing flatus  Patient started on Sennakot, Colace, and Miralax (prn).    Hypertension  Patient was continued on Metoprolol  Blood pressure was monitored  DASH diet    Chronic Anemia  Iron levels mildly decreased  Baseline hemoglobin 8  CBC's monitored  Ferrous sulfate  No overt signs of active bleed    Goals of Care   INSERT 86 F with hx of HTN, HLD presents s/p mechanical fall and c/o ambulatory dysfunction with b/l buttock pain.  Pt denied  any preceding symptomology and ROS was otherwise negative except for b/l buttock throbbing/sharp pain, difficulty with ambulation and inability to sit without pain. Pt denied paresthesias, bowel/bladder incontinence, head trauma.     In ED, pt was well-appearing and SLR was negative.  VS were 98.8 F, 165/42, 73 bpm, 20 rr, 98% RA.    Patient was admitted to internal medicine for lower back pain and inability and/or difficulty ambulating.      Unable to ambulate, s/p mechanical fall.  X-ray of bilateral pelvis/back positive for pelvic fracture   s/p CT pelvis-Subacute left superior and inferior pubic rami fractures with callus formation without significant bony bridging.  Subacute to chronic right superior and inferior pubic rami fractures with   the fracture lines only minimally visualized  s/p ortho eval- no surgical interventions      Vit D level mildly decreased and pt started on Oscal with Vit D  PT recommended home assistance along with home PT.  s/p neurology evaluation (Dr. Guerra)  s/p pain management evaluation (RAFFAELE Schulz)    Constipation  + BS all four quads/passing flatus  Patient started on Senakot Colace, and Miralax (prn).    Hypertension  Patient was continued on Metoprolol  Blood pressure was monitored  DASH diet    Chronic Anemia  Iron levels mildly decreased  Baseline hemoglobin 8  CBC's monitored  Ferrous sulfate  No overt signs of active bleed    Goals of Care   s/p Goals of care conversation, MOLST form filled 86 F with hx of HTN, HLD presents s/p mechanical fall and c/o ambulatory dysfunction with b/l buttock pain.  Pt denied  any preceding symptomology and ROS was otherwise negative except for b/l buttock throbbing/sharp pain, difficulty with ambulation and inability to sit without pain. Pt denied paresthesias, bowel/bladder incontinence, head trauma.     In ED, pt was well-appearing and SLR was negative.  VS were 98.8 F, 165/42, 73 bpm, 20 rr, 98% RA.    Patient was admitted to internal medicine for lower back pain and inability and/or difficulty ambulating.      Unable to ambulate, s/p mechanical fall.  X-ray of bilateral pelvis/back positive for pelvic fracture   s/p CT pelvis-Subacute left superior and inferior pubic rami fractures with callus formation without significant bony bridging.  Subacute to chronic right superior and inferior pubic rami fractures with   the fracture lines only minimally visualized  s/p ortho eval- no surgical interventions      Vit D level mildly decreased and pt started on Oscal with Vit D  PT recommended home assistance along with home PT.  s/p neurology evaluation (Dr. Guerra)  s/p pain management evaluation (RAFFAELE Schulz)    Constipation  + BS all four quads/passing flatus  Patient started on Senakot Colace, and Miralax (prn).    Hypertension  Patient was continued on Metoprolol  Blood pressure was monitored  DASH diet    Chronic Anemia  Iron levels mildly decreased  Baseline hemoglobin 8  CBC's monitored  Ferrous sulfate  No overt signs of active bleed    Goals of Care   s/p Goals of care conversation 86 F with hx of HTN, HLD presents s/p mechanical fall and c/o ambulatory dysfunction with b/l buttock pain.  Pt denied  any preceding symptomology and ROS was otherwise negative except for b/l buttock throbbing/sharp pain, difficulty with ambulation and inability to sit without pain. Pt denied paresthesias, bowel/bladder incontinence, head trauma.     In ED, pt was well-appearing and SLR was negative.  VS were 98.8 F, 165/42, 73 bpm, 20 rr, 98% RA.    Patient was admitted to internal medicine for lower back pain and inability and/or difficulty ambulating.      Unable to ambulate, s/p mechanical fall.  X-ray of bilateral pelvis/back positive for pelvic fracture   s/p CT pelvis-Subacute left superior and inferior pubic rami fractures with callus formation without significant bony bridging.  Subacute to chronic right superior and inferior pubic rami fractures with   the fracture lines only minimally visualized  s/p ortho eval- no surgical interventions      Vit D level mildly decreased and pt started on Oscal with Vit D  PT recommended home assistance along with home PT.  s/p neurology evaluation (Dr. Guerra) recommended outpt. neuropathy work-up  s/p pain management evaluation (Zeus, RAFFAELE)    Constipation  + BS all four quads/passing flatus  Patient started on Senakot Colace, and Miralax (prn).    Hypertension  Patient was continued on Metoprolol  Blood pressure was monitored  DASH diet    Chronic Anemia  Iron levels mildly decreased  Baseline hemoglobin 8  CBC's monitored  Ferrous sulfate  No overt signs of active bleed    Goals of Care   s/p Goals of care conversation

## 2021-03-21 NOTE — DISCHARGE NOTE ADULT - PLAN OF CARE
Warm pt will remain symptoms free Follow up with primary physician and neurologist in 1 week.  Pt needs outpatient neuropathy work -up with EMG/NCS conservative management. Take pain medications  when needed maintain fall precautions follow up with primary physician follow up with primary physician. Repeat CBC continue medications and diet

## 2021-10-05 ENCOUNTER — APPOINTMENT (OUTPATIENT)
Dept: ORTHOPEDIC SURGERY | Facility: CLINIC | Age: 86
End: 2021-10-05

## 2022-07-20 ENCOUNTER — EMERGENCY (EMERGENCY)
Facility: HOSPITAL | Age: 87
LOS: 1 days | Discharge: HOME | End: 2022-07-20
Attending: STUDENT IN AN ORGANIZED HEALTH CARE EDUCATION/TRAINING PROGRAM | Admitting: STUDENT IN AN ORGANIZED HEALTH CARE EDUCATION/TRAINING PROGRAM

## 2022-07-20 VITALS
TEMPERATURE: 98 F | HEART RATE: 88 BPM | HEIGHT: 64 IN | RESPIRATION RATE: 18 BRPM | SYSTOLIC BLOOD PRESSURE: 153 MMHG | DIASTOLIC BLOOD PRESSURE: 63 MMHG | OXYGEN SATURATION: 99 %

## 2022-07-20 VITALS
OXYGEN SATURATION: 98 % | TEMPERATURE: 98 F | HEART RATE: 87 BPM | DIASTOLIC BLOOD PRESSURE: 82 MMHG | SYSTOLIC BLOOD PRESSURE: 154 MMHG | RESPIRATION RATE: 18 BRPM

## 2022-07-20 DIAGNOSIS — S72.009A FRACTURE OF UNSPECIFIED PART OF NECK OF UNSPECIFIED FEMUR, INITIAL ENCOUNTER FOR CLOSED FRACTURE: Chronic | ICD-10-CM

## 2022-07-20 LAB
ALBUMIN SERPL ELPH-MCNC: 3.9 G/DL — SIGNIFICANT CHANGE UP (ref 3.3–5)
ALP SERPL-CCNC: 94 U/L — SIGNIFICANT CHANGE UP (ref 40–120)
ALT FLD-CCNC: 16 U/L — SIGNIFICANT CHANGE UP (ref 4–33)
ANION GAP SERPL CALC-SCNC: 13 MMOL/L — SIGNIFICANT CHANGE UP (ref 7–14)
AST SERPL-CCNC: 29 U/L — SIGNIFICANT CHANGE UP (ref 4–32)
BASOPHILS # BLD AUTO: 0 K/UL — SIGNIFICANT CHANGE UP (ref 0–0.2)
BASOPHILS NFR BLD AUTO: 0 % — SIGNIFICANT CHANGE UP (ref 0–2)
BILIRUB SERPL-MCNC: <0.2 MG/DL — SIGNIFICANT CHANGE UP (ref 0.2–1.2)
BUN SERPL-MCNC: 39 MG/DL — HIGH (ref 7–23)
CALCIUM SERPL-MCNC: 8.9 MG/DL — SIGNIFICANT CHANGE UP (ref 8.4–10.5)
CHLORIDE SERPL-SCNC: 103 MMOL/L — SIGNIFICANT CHANGE UP (ref 98–107)
CK SERPL-CCNC: 81 U/L — SIGNIFICANT CHANGE UP (ref 25–170)
CO2 SERPL-SCNC: 21 MMOL/L — LOW (ref 22–31)
CREAT SERPL-MCNC: 1.36 MG/DL — HIGH (ref 0.5–1.3)
EGFR: 37 ML/MIN/1.73M2 — LOW
EOSINOPHIL # BLD AUTO: 0 K/UL — SIGNIFICANT CHANGE UP (ref 0–0.5)
EOSINOPHIL NFR BLD AUTO: 0 % — SIGNIFICANT CHANGE UP (ref 0–6)
GLUCOSE SERPL-MCNC: 130 MG/DL — HIGH (ref 70–99)
HCT VFR BLD CALC: 24.8 % — LOW (ref 34.5–45)
HGB BLD-MCNC: 7.7 G/DL — LOW (ref 11.5–15.5)
IANC: 4.09 K/UL — SIGNIFICANT CHANGE UP (ref 1.8–7.4)
IMM GRANULOCYTES NFR BLD AUTO: 0.8 % — SIGNIFICANT CHANGE UP (ref 0–1.5)
LYMPHOCYTES # BLD AUTO: 0.52 K/UL — LOW (ref 1–3.3)
LYMPHOCYTES # BLD AUTO: 10 % — LOW (ref 13–44)
MAGNESIUM SERPL-MCNC: 2.6 MG/DL — SIGNIFICANT CHANGE UP (ref 1.6–2.6)
MCHC RBC-ENTMCNC: 31 GM/DL — LOW (ref 32–36)
MCHC RBC-ENTMCNC: 32.1 PG — SIGNIFICANT CHANGE UP (ref 27–34)
MCV RBC AUTO: 103.3 FL — HIGH (ref 80–100)
MONOCYTES # BLD AUTO: 0.53 K/UL — SIGNIFICANT CHANGE UP (ref 0–0.9)
MONOCYTES NFR BLD AUTO: 10.2 % — SIGNIFICANT CHANGE UP (ref 2–14)
NEUTROPHILS # BLD AUTO: 4.09 K/UL — SIGNIFICANT CHANGE UP (ref 1.8–7.4)
NEUTROPHILS NFR BLD AUTO: 79 % — HIGH (ref 43–77)
NRBC # BLD: 0 /100 WBCS — SIGNIFICANT CHANGE UP
NRBC # FLD: 0 K/UL — SIGNIFICANT CHANGE UP
PLATELET # BLD AUTO: 179 K/UL — SIGNIFICANT CHANGE UP (ref 150–400)
POTASSIUM SERPL-MCNC: 5.2 MMOL/L — SIGNIFICANT CHANGE UP (ref 3.5–5.3)
POTASSIUM SERPL-SCNC: 5.2 MMOL/L — SIGNIFICANT CHANGE UP (ref 3.5–5.3)
PROT SERPL-MCNC: 7.2 G/DL — SIGNIFICANT CHANGE UP (ref 6–8.3)
RBC # BLD: 2.4 M/UL — LOW (ref 3.8–5.2)
RBC # FLD: 13.2 % — SIGNIFICANT CHANGE UP (ref 10.3–14.5)
SARS-COV-2 RNA SPEC QL NAA+PROBE: DETECTED
SODIUM SERPL-SCNC: 137 MMOL/L — SIGNIFICANT CHANGE UP (ref 135–145)
WBC # BLD: 5.18 K/UL — SIGNIFICANT CHANGE UP (ref 3.8–10.5)
WBC # FLD AUTO: 5.18 K/UL — SIGNIFICANT CHANGE UP (ref 3.8–10.5)

## 2022-07-20 PROCEDURE — 72128 CT CHEST SPINE W/O DYE: CPT | Mod: 26,ME

## 2022-07-20 PROCEDURE — 99285 EMERGENCY DEPT VISIT HI MDM: CPT

## 2022-07-20 PROCEDURE — G1004: CPT

## 2022-07-20 PROCEDURE — 72125 CT NECK SPINE W/O DYE: CPT | Mod: 26,ME

## 2022-07-20 PROCEDURE — 71045 X-RAY EXAM CHEST 1 VIEW: CPT | Mod: 26

## 2022-07-20 PROCEDURE — 73030 X-RAY EXAM OF SHOULDER: CPT | Mod: 26,LT

## 2022-07-20 PROCEDURE — 71250 CT THORAX DX C-: CPT | Mod: 26,MG

## 2022-07-20 PROCEDURE — 70450 CT HEAD/BRAIN W/O DYE: CPT | Mod: 26,MG

## 2022-07-20 RX ORDER — SODIUM CHLORIDE 9 MG/ML
500 INJECTION INTRAMUSCULAR; INTRAVENOUS; SUBCUTANEOUS ONCE
Refills: 0 | Status: COMPLETED | OUTPATIENT
Start: 2022-07-20 | End: 2022-07-20

## 2022-07-20 RX ADMIN — SODIUM CHLORIDE 500 MILLILITER(S): 9 INJECTION INTRAMUSCULAR; INTRAVENOUS; SUBCUTANEOUS at 12:44

## 2022-07-20 NOTE — ED ADULT NURSE NOTE - OBJECTIVE STATEMENT
Pt is an [place]-speaking female, brought in by family member s/p multiple falls at home. Pt ambulatory with walker at baseline, had a mechanical fall yesterday and several days prior. Denies head strike or LOC. Pt denies pain at this time.

## 2022-07-20 NOTE — ED ADULT NURSE NOTE - NSIMPLEMENTINTERV_GEN_ALL_ED
Implemented All Fall with Harm Risk Interventions:  [place] to call system. Call bell, personal items and telephone within reach. Instruct patient to call for assistance. Room bathroom lighting operational. Non-slip footwear when patient is off stretcher. Physically safe environment: no spills, clutter or unnecessary equipment. Stretcher in lowest position, wheels locked, appropriate side rails in place. Provide visual cue, wrist band, yellow gown, etc. Monitor gait and stability. Monitor for mental status changes and reorient to person, place, and time. Review medications for side effects contributing to fall risk. Reinforce activity limits and safety measures with patient and family. Provide visual clues: red socks.

## 2022-07-20 NOTE — ED PROVIDER NOTE - NSFOLLOWUPINSTRUCTIONS_ED_ALL_ED_FT
Please follow up with primary care doctor within a week.   Return to emergency room if symptoms worsen.

## 2022-07-20 NOTE — ED ADULT TRIAGE NOTE - CHIEF COMPLAINT QUOTE
pt reports slip and fall on her way to the bathroom this morning. pt also reports fall 4 days ago landing on her back. pt c/o back pain and chest pain when coughing since yesterday. as per pts grandson pt has had no appetite. pt denies head trauma, LOC, blood thinner use, sob, n/v/d, fevers, chills. PMH HTN, anemia.

## 2022-07-20 NOTE — ED PROVIDER NOTE - NSICDXPASTMEDICALHX_GEN_ALL_CORE_FT
PAST MEDICAL HISTORY:  Anemia     Arthritis     Bunion of Great Toe; and lateral Right foot/ deformity Right 4th toe     Foot Callus Right foot surgery 2008    Hyperlipidemia     Hypertension     Neuropathy     Renal cyst

## 2022-07-20 NOTE — ED PROVIDER NOTE - PATIENT PORTAL LINK FT
You can access the FollowMyHealth Patient Portal offered by [facility] by registering at the following website: [email or web address]. By joining [facility]’s FollowMyHealth portal, you will also be able to view your health information using other applications (apps) compatible with our system.

## 2022-07-20 NOTE — ED PROVIDER NOTE - ATTENDING CONTRIBUTION TO CARE
Dr. Mcdonald, Attending Physician-  I performed a face to face bedside interview with patient regarding history of present illness, review of symptoms and past medical history. I completed an independent physical exam.  I have discussed patient's plan of care with the resident.    89F, HTN, anemia, on aspirin, who presents after fall. Patient lives in her apartment by herself. Family lives in apartment above. Ambulatory with a walker. About 3-4 days ago, was going to stand up when she slipped and fell onto her upper back. No head trauma. No syncope. Ambulatory afterwards. Last night, patient fell again this time, however forward. No head trauma, no syncope. Denies prodromal symptoms for either event - no preceding vertigo, dizziness, chest pain, sob, nausea. At this time, complaining of mainly L posterior shoulder pain. No headaches, fevers, chills, cough, sputum, cp, sob, abdominal pain, nvd, black/bloody stools. Physical: afebrile, well appearing, rrr, ctabl, abdomen soft, no le edema, TRIMBLE, cn2-12 intact. Mild T spine ttp and left posterior shoulder ttp. Able to range both UE and LE without any issues. Plan: screening labs, XRs, CTs. Dispo pending.

## 2022-07-20 NOTE — ED PROVIDER NOTE - OBJECTIVE STATEMENT
89yF PMHx HTN and anemia, complaining of fall. Pt lives alone, with her family living in the apartment upstairs. Uses walker at baseline. 4 days ago, pt was standing up from seated position when she slipped and fell onto her back. Denies hitting her head or LOC. Family upstairs heard the fall and within a few minutes came downstairs to help. Complaining of back pain, L shoulder pain since fall. No appetite due to pain. 1 day ago pt had another fall, falling forward. Found by family immediately. Able to walk with walker afterwards.   Both falls, pt denies dizziness, lightheadedness, or CP prior to falls.

## 2022-07-20 NOTE — ED PROVIDER NOTE - NSICDXPASTSURGICALHX_GEN_ALL_CORE_FT
PAST SURGICAL HISTORY:  Gall Bladder Disease Cholecystectomy  1994    Hip fracture s/p surgery in june/2015    History of Cholecystectomy

## 2022-09-13 ENCOUNTER — INPATIENT (INPATIENT)
Facility: HOSPITAL | Age: 87
LOS: 6 days | Discharge: NURSING, REHAB OR LONG-TERM CARE | DRG: 480 | End: 2022-09-20
Attending: INTERNAL MEDICINE | Admitting: INTERNAL MEDICINE
Payer: COMMERCIAL

## 2022-09-13 VITALS
WEIGHT: 149.91 LBS | DIASTOLIC BLOOD PRESSURE: 72 MMHG | TEMPERATURE: 98 F | SYSTOLIC BLOOD PRESSURE: 205 MMHG | OXYGEN SATURATION: 98 % | HEART RATE: 86 BPM | HEIGHT: 64 IN | RESPIRATION RATE: 18 BRPM

## 2022-09-13 DIAGNOSIS — D64.9 ANEMIA, UNSPECIFIED: ICD-10-CM

## 2022-09-13 DIAGNOSIS — S72.009A FRACTURE OF UNSPECIFIED PART OF NECK OF UNSPECIFIED FEMUR, INITIAL ENCOUNTER FOR CLOSED FRACTURE: Chronic | ICD-10-CM

## 2022-09-13 DIAGNOSIS — Z01.818 ENCOUNTER FOR OTHER PREPROCEDURAL EXAMINATION: ICD-10-CM

## 2022-09-13 DIAGNOSIS — S72.009A FRACTURE OF UNSPECIFIED PART OF NECK OF UNSPECIFIED FEMUR, INITIAL ENCOUNTER FOR CLOSED FRACTURE: ICD-10-CM

## 2022-09-13 DIAGNOSIS — S72.001A FRACTURE OF UNSPECIFIED PART OF NECK OF RIGHT FEMUR, INITIAL ENCOUNTER FOR CLOSED FRACTURE: ICD-10-CM

## 2022-09-13 DIAGNOSIS — E78.5 HYPERLIPIDEMIA, UNSPECIFIED: ICD-10-CM

## 2022-09-13 DIAGNOSIS — Z29.9 ENCOUNTER FOR PROPHYLACTIC MEASURES, UNSPECIFIED: ICD-10-CM

## 2022-09-13 DIAGNOSIS — I10 ESSENTIAL (PRIMARY) HYPERTENSION: ICD-10-CM

## 2022-09-13 LAB
ABO RH CONFIRMATION: SIGNIFICANT CHANGE UP
ALBUMIN SERPL ELPH-MCNC: 3.6 G/DL — SIGNIFICANT CHANGE UP (ref 3.5–5)
ALP SERPL-CCNC: 81 U/L — SIGNIFICANT CHANGE UP (ref 40–120)
ALT FLD-CCNC: 15 U/L DA — SIGNIFICANT CHANGE UP (ref 10–60)
ANION GAP SERPL CALC-SCNC: 9 MMOL/L — SIGNIFICANT CHANGE UP (ref 5–17)
APPEARANCE UR: CLEAR — SIGNIFICANT CHANGE UP
APTT BLD: 29.1 SEC — SIGNIFICANT CHANGE UP (ref 27.5–35.5)
AST SERPL-CCNC: 19 U/L — SIGNIFICANT CHANGE UP (ref 10–40)
BACTERIA # UR AUTO: ABNORMAL /HPF
BASOPHILS # BLD AUTO: 0.01 K/UL — SIGNIFICANT CHANGE UP (ref 0–0.2)
BASOPHILS NFR BLD AUTO: 0.2 % — SIGNIFICANT CHANGE UP (ref 0–2)
BILIRUB SERPL-MCNC: 0.4 MG/DL — SIGNIFICANT CHANGE UP (ref 0.2–1.2)
BILIRUB UR-MCNC: NEGATIVE — SIGNIFICANT CHANGE UP
BLD GP AB SCN SERPL QL: SIGNIFICANT CHANGE UP
BUN SERPL-MCNC: 25 MG/DL — HIGH (ref 7–18)
CALCIUM SERPL-MCNC: 9.8 MG/DL — SIGNIFICANT CHANGE UP (ref 8.4–10.5)
CHLORIDE SERPL-SCNC: 105 MMOL/L — SIGNIFICANT CHANGE UP (ref 96–108)
CK SERPL-CCNC: 68 U/L — SIGNIFICANT CHANGE UP (ref 21–215)
CO2 SERPL-SCNC: 24 MMOL/L — SIGNIFICANT CHANGE UP (ref 22–31)
COLOR SPEC: YELLOW — SIGNIFICANT CHANGE UP
CREAT SERPL-MCNC: 1.31 MG/DL — HIGH (ref 0.5–1.3)
DIFF PNL FLD: ABNORMAL
EGFR: 38 ML/MIN/1.73M2 — LOW
EOSINOPHIL # BLD AUTO: 0.03 K/UL — SIGNIFICANT CHANGE UP (ref 0–0.5)
EOSINOPHIL NFR BLD AUTO: 0.5 % — SIGNIFICANT CHANGE UP (ref 0–6)
EPI CELLS # UR: SIGNIFICANT CHANGE UP /HPF
GLUCOSE SERPL-MCNC: 130 MG/DL — HIGH (ref 70–99)
GLUCOSE UR QL: NEGATIVE — SIGNIFICANT CHANGE UP
HCT VFR BLD CALC: 27 % — LOW (ref 34.5–45)
HGB BLD-MCNC: 8.2 G/DL — LOW (ref 11.5–15.5)
IMM GRANULOCYTES NFR BLD AUTO: 0.7 % — SIGNIFICANT CHANGE UP (ref 0–1.5)
INR BLD: 0.94 RATIO — SIGNIFICANT CHANGE UP (ref 0.88–1.16)
KETONES UR-MCNC: ABNORMAL
LEUKOCYTE ESTERASE UR-ACNC: NEGATIVE — SIGNIFICANT CHANGE UP
LYMPHOCYTES # BLD AUTO: 0.62 K/UL — LOW (ref 1–3.3)
LYMPHOCYTES # BLD AUTO: 10.3 % — LOW (ref 13–44)
MAGNESIUM SERPL-MCNC: 2.4 MG/DL — SIGNIFICANT CHANGE UP (ref 1.6–2.6)
MCHC RBC-ENTMCNC: 30.4 GM/DL — LOW (ref 32–36)
MCHC RBC-ENTMCNC: 30.7 PG — SIGNIFICANT CHANGE UP (ref 27–34)
MCV RBC AUTO: 101.1 FL — HIGH (ref 80–100)
MONOCYTES # BLD AUTO: 0.42 K/UL — SIGNIFICANT CHANGE UP (ref 0–0.9)
MONOCYTES NFR BLD AUTO: 7 % — SIGNIFICANT CHANGE UP (ref 2–14)
NEUTROPHILS # BLD AUTO: 4.89 K/UL — SIGNIFICANT CHANGE UP (ref 1.8–7.4)
NEUTROPHILS NFR BLD AUTO: 81.3 % — HIGH (ref 43–77)
NITRITE UR-MCNC: NEGATIVE — SIGNIFICANT CHANGE UP
NRBC # BLD: 0 /100 WBCS — SIGNIFICANT CHANGE UP (ref 0–0)
NT-PROBNP SERPL-SCNC: 737 PG/ML — HIGH (ref 0–450)
PH UR: 5 — SIGNIFICANT CHANGE UP (ref 5–8)
PLATELET # BLD AUTO: 205 K/UL — SIGNIFICANT CHANGE UP (ref 150–400)
POTASSIUM SERPL-MCNC: 4.6 MMOL/L — SIGNIFICANT CHANGE UP (ref 3.5–5.3)
POTASSIUM SERPL-SCNC: 4.6 MMOL/L — SIGNIFICANT CHANGE UP (ref 3.5–5.3)
PROT SERPL-MCNC: 7.5 G/DL — SIGNIFICANT CHANGE UP (ref 6–8.3)
PROT UR-MCNC: 30 MG/DL
PROTHROM AB SERPL-ACNC: 11.2 SEC — SIGNIFICANT CHANGE UP (ref 10.5–13.4)
RBC # BLD: 2.67 M/UL — LOW (ref 3.8–5.2)
RBC # FLD: 14.4 % — SIGNIFICANT CHANGE UP (ref 10.3–14.5)
RBC CASTS # UR COMP ASSIST: ABNORMAL /HPF (ref 0–2)
SARS-COV-2 RNA SPEC QL NAA+PROBE: DETECTED
SODIUM SERPL-SCNC: 138 MMOL/L — SIGNIFICANT CHANGE UP (ref 135–145)
SP GR SPEC: 1.02 — SIGNIFICANT CHANGE UP (ref 1.01–1.02)
TROPONIN I, HIGH SENSITIVITY RESULT: 22.2 NG/L — SIGNIFICANT CHANGE UP
UROBILINOGEN FLD QL: NEGATIVE — SIGNIFICANT CHANGE UP
WBC # BLD: 6.01 K/UL — SIGNIFICANT CHANGE UP (ref 3.8–10.5)
WBC # FLD AUTO: 6.01 K/UL — SIGNIFICANT CHANGE UP (ref 3.8–10.5)
WBC UR QL: SIGNIFICANT CHANGE UP /HPF (ref 0–5)

## 2022-09-13 PROCEDURE — 72192 CT PELVIS W/O DYE: CPT | Mod: 26

## 2022-09-13 PROCEDURE — 73552 X-RAY EXAM OF FEMUR 2/>: CPT | Mod: 26,RT

## 2022-09-13 PROCEDURE — 99223 1ST HOSP IP/OBS HIGH 75: CPT | Mod: GC

## 2022-09-13 PROCEDURE — 99285 EMERGENCY DEPT VISIT HI MDM: CPT

## 2022-09-13 PROCEDURE — 73502 X-RAY EXAM HIP UNI 2-3 VIEWS: CPT | Mod: 26,RT

## 2022-09-13 PROCEDURE — 71045 X-RAY EXAM CHEST 1 VIEW: CPT | Mod: 26

## 2022-09-13 RX ORDER — HEPARIN SODIUM 5000 [USP'U]/ML
5000 INJECTION INTRAVENOUS; SUBCUTANEOUS ONCE
Refills: 0 | Status: COMPLETED | OUTPATIENT
Start: 2022-09-13 | End: 2022-09-14

## 2022-09-13 RX ORDER — LOSARTAN POTASSIUM 100 MG/1
50 TABLET, FILM COATED ORAL
Qty: 0 | Refills: 0 | DISCHARGE

## 2022-09-13 RX ORDER — MORPHINE SULFATE 50 MG/1
4 CAPSULE, EXTENDED RELEASE ORAL ONCE
Refills: 0 | Status: DISCONTINUED | OUTPATIENT
Start: 2022-09-13 | End: 2022-09-13

## 2022-09-13 RX ORDER — HYDROMORPHONE HYDROCHLORIDE 2 MG/ML
0.5 INJECTION INTRAMUSCULAR; INTRAVENOUS; SUBCUTANEOUS EVERY 6 HOURS
Refills: 0 | Status: DISCONTINUED | OUTPATIENT
Start: 2022-09-13 | End: 2022-09-15

## 2022-09-13 RX ORDER — METOPROLOL TARTRATE 50 MG
25 TABLET ORAL
Refills: 0 | Status: DISCONTINUED | OUTPATIENT
Start: 2022-09-13 | End: 2022-09-15

## 2022-09-13 RX ORDER — GABAPENTIN 400 MG/1
1 CAPSULE ORAL
Qty: 0 | Refills: 0 | DISCHARGE

## 2022-09-13 RX ORDER — SIMVASTATIN 20 MG/1
40 TABLET, FILM COATED ORAL AT BEDTIME
Refills: 0 | Status: DISCONTINUED | OUTPATIENT
Start: 2022-09-13 | End: 2022-09-15

## 2022-09-13 RX ORDER — METOPROLOL TARTRATE 50 MG
1 TABLET ORAL
Qty: 0 | Refills: 0 | DISCHARGE

## 2022-09-13 RX ORDER — ONDANSETRON 8 MG/1
4 TABLET, FILM COATED ORAL ONCE
Refills: 0 | Status: COMPLETED | OUTPATIENT
Start: 2022-09-13 | End: 2022-09-13

## 2022-09-13 RX ORDER — ACETAMINOPHEN WITH CODEINE 300MG-30MG
1 TABLET ORAL EVERY 4 HOURS
Refills: 0 | Status: DISCONTINUED | OUTPATIENT
Start: 2022-09-13 | End: 2022-09-14

## 2022-09-13 RX ORDER — ALENDRONATE SODIUM 70 MG/1
1 TABLET ORAL
Qty: 0 | Refills: 0 | DISCHARGE

## 2022-09-13 RX ADMIN — HYDROMORPHONE HYDROCHLORIDE 0.5 MILLIGRAM(S): 2 INJECTION INTRAMUSCULAR; INTRAVENOUS; SUBCUTANEOUS at 22:40

## 2022-09-13 RX ADMIN — Medication 25 MILLIGRAM(S): at 21:08

## 2022-09-13 RX ADMIN — HYDROMORPHONE HYDROCHLORIDE 0.5 MILLIGRAM(S): 2 INJECTION INTRAMUSCULAR; INTRAVENOUS; SUBCUTANEOUS at 23:00

## 2022-09-13 RX ADMIN — MORPHINE SULFATE 4 MILLIGRAM(S): 50 CAPSULE, EXTENDED RELEASE ORAL at 14:01

## 2022-09-13 RX ADMIN — ONDANSETRON 4 MILLIGRAM(S): 8 TABLET, FILM COATED ORAL at 14:01

## 2022-09-13 RX ADMIN — MORPHINE SULFATE 4 MILLIGRAM(S): 50 CAPSULE, EXTENDED RELEASE ORAL at 14:31

## 2022-09-13 NOTE — H&P ADULT - PROBLEM SELECTOR PLAN 2
- patient noted to have hx of anemia requiring iron transfusions   - hgb on admission 8.2  - will transfuse 2 units PRBCs as per ortho with goal hgb 10  - post transfusion cbc   - monitor hgb Patient planned for IM Pinning of Right Hip Fracture  Patient has good functional status, able to walk 2 blocks on level ground  Denies chest pain, no signs of decompensated heart failure  Due to urgent surgery, patient medically optimized for surgery, patient is an intermediate-risk for an intermediate risk procedure  Continue on beta blocker pre-operatively, will hold Lasix pre-operatively, can resume when patient diet resumed  Smiley Pre-operative risk 0.5% of KYLIE

## 2022-09-13 NOTE — H&P ADULT - PROBLEM SELECTOR PLAN 3
- patient noted to be on metoprolol   - resume metoprolol  - DASH diet   - monitor BP - patient noted to have hx of anemia requiring iron transfusions   - hgb on admission 8.2  -has right adductor hematoma  - will transfuse 2 units PRBCs as per ortho with goal hgb 10  - post transfusion cbc   - monitor hgb

## 2022-09-13 NOTE — H&P ADULT - PROBLEM SELECTOR PLAN 1
- patient presenting with fall   - found to have right hip fracture on imaging  - patient euvolemic on exam, METS>4   - Pain management with codeine #3 for moderate pain, hydromorphone for severe pain  - will transfuse two units PRBCs (consent obtained) for goal hgb 10, as per ortho  - Ortho consult  - Pain management consult   - SCDs for DVT prophylaxis   - NPO after midnight   - RCRI: Patient is a Class II risk with 6.0% 30- day risk of death, MI or cardiac arrest  - Reis: 0.5% risk of MI or cardiac arrest intraoperatively or up to 30 days post op  - No further inpatient tested needed prior to surgery

## 2022-09-13 NOTE — ED PROVIDER NOTE - CARE PLAN
English Principal Discharge DX:	Hip fracture  Secondary Diagnosis:	Anemia  Secondary Diagnosis:	RAMSEY (acute kidney injury)   1

## 2022-09-13 NOTE — ED PROVIDER NOTE - NSICDXPASTSURGICALHX_GEN_ALL_CORE_FT
PAST SURGICAL HISTORY:  Gall Bladder Disease Cholecystectomy  1994    Hip fracture s/p surgery in june/2015 -- pt's son denies?    History of Cholecystectomy

## 2022-09-13 NOTE — CONSULT NOTE ADULT - SUBJECTIVE AND OBJECTIVE BOX
Pt Name: KALPANA DAVIDSON  MRN: [id number]    ORTHOPEDIC CONSULT:    Orthopedic diagnosis:    HPI:  Patient is a 89 yo Filipino speaking female (accompanied by her son Isac, who provides translation) with PMH HTN, anemia requiring Iron transfusions (last one before COVID), HLD BIBEMS to the ED several hours after a witnessed fall. According to patient, she tripped and fell backwards in her kitchen and home health aide caught the patient's back before it hit the ground patient landed on her R buttock but without striking her head. Patient denies any dizziness, weakness, visual or neurological symptoms before falling. Patient reports 8/10 sharp, throbbing pain on the right hip. Pain is non radiating and worsened with movement but made better with the morphine. Patient denies any syncope. Pt denies any CP, SOB, abdominal pain, nausea vomiting or changes in bowel habits. Patient denies fevers or chills. At baseline, patient can perform ADLs, walks with no devices, and can walk on a flat surface and incline with no symptoms.        PAST MEDICAL & SURGICAL HISTORY:  Hyperlipidemia      Arthritis      Anemia      Neuropathy      Hypertension      History of Cholecystectomy      Gall Bladder Disease  Cholecystectomy  1994      Hip fracture  s/p surgery in june/2015 -- pt&#x27;s son denies?          ALLERGIES: No Known Allergies      MEDICATIONS: acetaminophen  300 mG/codeine 30 mG 1 Tablet(s) Oral every 4 hours PRN  heparin   Injectable 5000 Unit(s) IV Push once  HYDROmorphone  Injectable 0.5 milliGRAM(s) IV Push every 6 hours PRN  metoprolol tartrate 25 milliGRAM(s) Oral two times a day  simvastatin 40 milliGRAM(s) Oral at bedtime      PHYSICAL EXAM:    Vital Signs Last 24 Hrs  T(C): 36.7 (13 Sep 2022 13:04), Max: 36.7 (13 Sep 2022 13:04)  T(F): 98 (13 Sep 2022 13:04), Max: 98 (13 Sep 2022 13:04)  HR: 86 (13 Sep 2022 13:04) (86 - 86)  BP: 205/72 (13 Sep 2022 13:04) (205/72 - 205/72)  BP(mean): --  RR: 18 (13 Sep 2022 13:04) (18 - 18)  SpO2: 98% (13 Sep 2022 13:04) (98% - 98%)        Right Hip/Lower Extremity: Skin intact. Tenderness to palpation. RLE shortened and externally rotated. Calves soft and NT B/L. ROM of toes, ankles B/L. Decreased ROM of right hip due to pain.       LABS:                        8.2    6.01  )-----------( 205      ( 13 Sep 2022 13:12 )             27.0     09-13    138  |  105  |  25<H>  ----------------------------<  130<H>  4.6   |  24  |  1.31<H>    Ca    9.8      13 Sep 2022 13:12  Mg     2.4     09-13    TPro  7.5  /  Alb  3.6  /  TBili  0.4  /  DBili  x   /  AST  19  /  ALT  15  /  AlkPhos  81  09-13    PT/INR - ( 13 Sep 2022 13:12 )   PT: 11.2 sec;   INR: 0.94 ratio         PTT - ( 13 Sep 2022 13:12 )  PTT:29.1 sec    RADIOLOGY:   Xray of Pelvis: Preliminary shows right hip Intertrochanteric/Subtroch Fracture      IMPRESSION: Pt is a  89y Female with Right Hip IT/Subtroch Fracture    PLAN:  -  Pain management  -  Recommend surgical intervention(Right Hip IM Nailing)  -  Keep NPO after midnight  -  For surgery tomorrow 9/14/22  -  Will need 1unit PRBC transfusion prior to surgery  -  Obtain consent for surgery  -  Medical clearance  -  Case discussed with Dr. Encinas

## 2022-09-13 NOTE — H&P ADULT - ATTENDING COMMENTS
89yo F PMHx of HTN, HLD who presented after a fall with a right hip fracture. Patient states she was in the kitchen when she fell backwards. Fall was witnessed by HHA who caught patient after she landed and kept her from hitting her head. Patient denies chest pain, dizziness or loss of consciousness. Reports that her legs just gave out and she fell back wards. In the ED , Hip Xray showed right displaced hip fracture. CXR with clear lungs. Patient Cr 1.3 which is similar to labs in July. Patient also COVID positive, but asymptomatic. Documented prior COVID positive test in July 2022.    #Right Intertrochanteric Femoral Fracture  #Right Adductor Hematoma  #Pre-operative Evaluation  #Hypertension  #Hyperlipidemia  #Chronic Kidney Disease Stage 3a  #Osteoporosis  #Anemia    -Orthopedic consult appreciated  -Hgb 8.2, transfuse for anticipated blood loss during surgery  -no active chest pain or shortness of breath, patient to proceed with urgent hip repair, plan for IM Hip Nailing, patient is an intermediate risk for an intermediate risk procedure, medically optimized, reasonable to proceed without further cardiac testing  -continue on metoprolol 25mg BID, treat pain if still hypertensive then can add amlodipine 5mg  -valle catheter  -Oxycodone 5mg for moderate pain, Dliaudid 0.5mg IV for break-through pain  -Avoid NSAIDs in patient with CKD  -continue on simvastatin  -PT consult post-operatively

## 2022-09-13 NOTE — ED PROVIDER NOTE - MUSCULOSKELETAL MINIMAL EXAM
+ R hip limited ROM 2/2 pain; R leg shortened and externally rotated at rest with severely ttp R hip; L hip FROM without tenderness

## 2022-09-13 NOTE — ED PROVIDER NOTE - OBJECTIVE STATEMENT
Pt is a 89 yo F PMH HTN BIBEMS to the ED several hours after witnessed trip and fall backwards in her kitchen, landing on her R buttock but without head strike as home health aide caught the patient's back before it hit the ground. PT reports 10/10, sharp stabbing pain in R hip and glute since fall. Pt denies any head or neck pain, headache, confusion, lightheadedness or vertigo symptoms, or LOC; Pt states mechanical nature to her fall. Pt denies any CP, SOB, abdominal pain, n/v/d. Pt has not taken anything for pain, came immediately to ED.

## 2022-09-13 NOTE — H&P ADULT - ASSESSMENT
Patient is a 89 yo Yakut speaking female (accompanied by her son Isac, who provides translation) with PMH HTN, anemia requiring Iron transfusions (last one before COVID), HLD BIBEMS to the ED several hours after a witnessed fall. Upon evaluation in ED, patient found to be hemodynamically stable but on imaging noted to have a right hip fracture. Patient admitted to medicine for right hip fracture.

## 2022-09-13 NOTE — ED ADULT NURSE NOTE - NSIMPLEMENTINTERV_GEN_ALL_ED
Implemented All Fall with Harm Risk Interventions:  [place] to call system. Call bell, personal items and telephone within reach. Instruct patient to call for assistance. Room bathroom lighting operational. Non-slip footwear when patient is off stretcher. Physically safe environment: no spills, clutter or unnecessary equipment. Stretcher in lowest position, wheels locked, appropriate side rails in place. Provide visual cue, wrist band, yellow gown, etc. Monitor gait and stability. Monitor for mental status changes and reorient to person, place, and time. Review medications for side effects contributing to fall risk. Reinforce activity limits and safety measures with patient and family. Provide visual clues: red socks.

## 2022-09-13 NOTE — H&P ADULT - NSHPPHYSICALEXAM_GEN_ALL_CORE
PHYSICAL EXAM:  GENERAL: NAD, speaks in full sentences, no signs of respiratory distress  HEAD:  Atraumatic, Normocephalic  EYES: EOMI, PERRLA, conjunctiva and sclera clear  NECK: Supple, No JVD  CHEST/LUNG: Clear to auscultation bilaterally; No wheeze; No crackles; No accessory muscles used  HEART: Regular rate and rhythm; No murmurs; gallops or rubs   ABDOMEN: Soft, Nontender, Nondistended; Bowel sounds present; No guarding  EXTREMITIES:  2+ Peripheral Pulses, No cyanosis or edema. RLE externally rotated. Sensation intact in LE B/L   PSYCH: AAOx3  NEUROLOGY: non-focal  SKIN: No rashes or lesions

## 2022-09-13 NOTE — ED ADULT NURSE NOTE - OBJECTIVE STATEMENT
pt is here for s/p fall.  As per family member, standing cooking and suddenly falling down to right side of hip, denied LOC or hit her head, h/s iron deficiency, HTN, denied chest pain or sob, able to move her b/l legs, c/o right hip pain.

## 2022-09-13 NOTE — ED PROVIDER NOTE - CROS ED SKIN ALL NEG
ringing, sinus pressure, nosebleed, nasal discharge, sore throat, oral sores, tooth pain, bleeding gums, hoarse voice, neck pain)      Cardiovascular: (HTN, chest pain, elevated cholesterol/lipids, palpitations, leg swelling, leg pain with walking)     Respiratory: (cough, wheezing, snoring, SOB with activity (dyspnea), SOB while lying flat (orthopnea), awakening with severe SOB (paroxysmal nocturnal dyspnea))     Gastrointestinal: (NVD, constipation, abdominal pain, bright red stools, black tarry stools, stool incontinence)     Genitourinary:  (pelvic pain, burning or frequency of urination, urinary urgency, blood in urine incomplete bladder emptying, urinary incontinence, STD; MEN: testicular pain or swelling, erectile dysfunction; WOMEN: LMP, heavy menstrual bleeding (menorrhagia), irregular periods, postmenopausal bleeding, menstrual pain (dymenorrhea, vaginal discharge)     Musculoskeletal: (bone pain/fracture, joint pain or swelling, musle pain)     Integumentary: (rashes, acne, non-healing sores, itching, breast lumps, breast pain, nipple discharge, hair loss)     Neurologic: (HA, muscle weakness, paresthesias (numbness, coldness, crawling or prickling), memory loss, seizure, dizziness)     Psychiatric:  (anxiety, sadness, irritability/anger, insomnia, suicidality)     Endocrine: (heat or cold intolerance, excessive thirst (polydipsia), excessive hunger (polyphagia))     Immune/Allergic: (hives, seasonal or environmental allergies, HIV exposure)     Hematologic/Lymphatic: (lymph node enlargement, easy bleeding or bruising)     History obtained via chart review and patient     PCP is Marielos Walton         Current Meds:     Home Medications           Prior to Admission medications    Medication Sig Start Date End Date Taking?  Authorizing Provider   doxepin (SINEQUAN) 10 MG capsule Take 1 capsule by mouth nightly 6/7/19   Yes ANDRIY Kay NP   busPIRone (BUSPAR) 10 MG tablet Take 1 tablet by mouth 3
negative...

## 2022-09-13 NOTE — H&P ADULT - NSHPREVIEWOFSYSTEMS_GEN_ALL_CORE
CONSTITUTIONAL: No changes in appetite or weakness.  No fever, weight loss, or fatigue  EYES: No eye pain, visual disturbances, or discharge  ENT:  No difficulty hearing, tinnitus, vertigo; No sinus or throat pain  NECK: No pain or stiffness  RESPIRATORY: No cough, wheezing, chills or hemoptysis; No Shortness of Breath  CARDIOVASCULAR: No chest pain, palpitations, passing out, dizziness, or leg swelling  GASTROINTESTINAL: No abdominal or epigastric pain. No nausea, vomiting, or hematemesis; No diarrhea or constipation. No melena or hematochezia.  GENITOURINARY: No dysuria, frequency, hematuria, or incontinence  NEUROLOGICAL: No headaches, memory loss, loss of strength, numbness, or tremors  SKIN: No skin lesions   LYMPH Nodes: No enlarged glands  ENDOCRINE: No heat or cold intolerance; No hair loss  MUSCULOSKELETAL: Has right hip pain; No muscle, back pain   PSYCHIATRIC: No depression, anxiety, mood swings, or difficulty sleeping  HEME/LYMPH: No easy bruising, or bleeding gums  ALLERGY AND IMMUNOLOGIC: No hives or eczema

## 2022-09-13 NOTE — H&P ADULT - HISTORY OF PRESENT ILLNESS
Patient is a 89 yo Khmer speaking female (accompanied by her son Isac, who provides translation) with PMH HTN, anemia requiring Iron transfusions (last one before COVID), HLD BIBEMS to the ED several hours after a witnessed fall. According to patient, she tripped and fell backwards in her kitchen and home health aide caught the patient's back before it hit the ground patient landed on her R buttock but without striking her head. Patient denies any dizziness, weakness, visual or neurological symptoms before falling. Patient reports 8/10 sharp, throbbing pain on the right hip. Pain is non radiating and worsened with movement but made better with the morphine. Patient denies any syncope. Pt denies any CP, SOB, abdominal pain, nausea vomiting or changes in bowel habits. Patient denies fevers or chills. At baseline, patient can perform ADLs, walks with no devices, and can walk on a flat surface and incline with no symptoms.

## 2022-09-13 NOTE — CONSULT NOTE ADULT - NS ATTEND AMEND GEN_ALL_CORE FT
89F sp fall with right peritrochanteric hip fracture. Patient seen and optimized by medicine and cardiology and evaluated at intermediate risk. Patient is indicated for right hip CMR. I discussed risks with patient, daughter, and son- infection, wound healing, non union, malunion, hardware failure, periprosthetic fracture, intraop fracture, stroke, blood clot, MI, death, cognitive decline post surgery. Benefits are immediate mobilization post op, avoidance of "fracture disease", and pain relief. Patient's daughter signed informed consent. Plan to proceed to OR today.

## 2022-09-13 NOTE — ED PROVIDER NOTE - ATTENDING CONTRIBUTION TO CARE
89 y.o. female normally walks with a walker, as per son, pt was cooking, lost balance & fell backwards, pt fell on her buttock, c/o Rt hip pain, pt's unable to get up.  HHA was able to hold pt's head, no head injury.   PE: in acute distress, overweight, mucosa-moist, EOMI, Heart- S1SeRR, Lung-clear, Abd- soft, NT obese, Ext-Rt hip-tenderness to palp., ext rotated, shorter, no CVAT, sensory/pulses intact  Pt with hip fracture, will get labs., X-ray, admission

## 2022-09-13 NOTE — H&P ADULT - PROBLEM SELECTOR PLAN 4
EXAMINATION:  CHEST SINGLE (PORTABLE)    



INDICATION:      

^sob

^Y



COMPARISON:  Chest CT dated 6/3/2019

     

FINDINGS:  AP view   



TUBES and LINES:  None.



LUNGS:  Lungs are well inflated.  Pulmonary vascular congestion and mild

interstitial edema.

      



PLEURA:  No significant pleural effusion or pneumothorax.



HEART AND MEDIASTINUM:  The cardiac silhouette is mildly enlarged.    



BONES AND SOFT TISSUES:  No acute osseous lesion.  Mildly elevated right

hemidiaphragm.



UPPER ABDOMEN: No free air under the diaphragm.    



IMPRESSION: 

Mildly enlarged cardiac silhouette, central vascular congestion, and mild

interstitial edema.





Signed by: Dr. Thomas Rojo MD on 6/4/2019 5:18 AM - continue with statin - patient noted to be on metoprolol   - resume metoprolol  - DASH diet   - monitor BP  - possibly hypertensive at this time due to pain, if BP does not improve then start amlodipine 5mg, or can give IV Hydralazine if NPO

## 2022-09-13 NOTE — H&P ADULT - NSHPSOCIALHISTORY_GEN_ALL_CORE
Patient lives with daughter, at baseline patient can walk on a flat surface without shortness of breath, can perform ADLS. Patient denies illicit drug use, ETOH or tobacco use.

## 2022-09-14 ENCOUNTER — TRANSCRIPTION ENCOUNTER (OUTPATIENT)
Age: 87
End: 2022-09-14

## 2022-09-14 DIAGNOSIS — M25.551 PAIN IN RIGHT HIP: ICD-10-CM

## 2022-09-14 DIAGNOSIS — K59.00 CONSTIPATION, UNSPECIFIED: ICD-10-CM

## 2022-09-14 DIAGNOSIS — N18.30 CHRONIC KIDNEY DISEASE, STAGE 3 UNSPECIFIED: ICD-10-CM

## 2022-09-14 LAB
ALBUMIN SERPL ELPH-MCNC: 3.3 G/DL — LOW (ref 3.5–5)
ALP SERPL-CCNC: 82 U/L — SIGNIFICANT CHANGE UP (ref 40–120)
ALT FLD-CCNC: 16 U/L DA — SIGNIFICANT CHANGE UP (ref 10–60)
ANION GAP SERPL CALC-SCNC: 7 MMOL/L — SIGNIFICANT CHANGE UP (ref 5–17)
APTT BLD: 27.7 SEC — SIGNIFICANT CHANGE UP (ref 27.5–35.5)
AST SERPL-CCNC: 19 U/L — SIGNIFICANT CHANGE UP (ref 10–40)
BILIRUB SERPL-MCNC: 0.5 MG/DL — SIGNIFICANT CHANGE UP (ref 0.2–1.2)
BUN SERPL-MCNC: 22 MG/DL — HIGH (ref 7–18)
CALCIUM SERPL-MCNC: 9.5 MG/DL — SIGNIFICANT CHANGE UP (ref 8.4–10.5)
CHLORIDE SERPL-SCNC: 105 MMOL/L — SIGNIFICANT CHANGE UP (ref 96–108)
CO2 SERPL-SCNC: 27 MMOL/L — SIGNIFICANT CHANGE UP (ref 22–31)
CREAT SERPL-MCNC: 1.25 MG/DL — SIGNIFICANT CHANGE UP (ref 0.5–1.3)
CULTURE RESULTS: SIGNIFICANT CHANGE UP
EGFR: 40 ML/MIN/1.73M2 — LOW
GLUCOSE SERPL-MCNC: 130 MG/DL — HIGH (ref 70–99)
HCT VFR BLD CALC: 27.4 % — LOW (ref 34.5–45)
HGB BLD-MCNC: 8.6 G/DL — LOW (ref 11.5–15.5)
INR BLD: 1.02 RATIO — SIGNIFICANT CHANGE UP (ref 0.88–1.16)
MAGNESIUM SERPL-MCNC: 2.7 MG/DL — HIGH (ref 1.6–2.6)
MCHC RBC-ENTMCNC: 31.3 PG — SIGNIFICANT CHANGE UP (ref 27–34)
MCHC RBC-ENTMCNC: 31.4 GM/DL — LOW (ref 32–36)
MCV RBC AUTO: 99.6 FL — SIGNIFICANT CHANGE UP (ref 80–100)
NRBC # BLD: 0 /100 WBCS — SIGNIFICANT CHANGE UP (ref 0–0)
PHOSPHATE SERPL-MCNC: 3.8 MG/DL — SIGNIFICANT CHANGE UP (ref 2.5–4.5)
PLATELET # BLD AUTO: 204 K/UL — SIGNIFICANT CHANGE UP (ref 150–400)
POTASSIUM SERPL-MCNC: 4.9 MMOL/L — SIGNIFICANT CHANGE UP (ref 3.5–5.3)
POTASSIUM SERPL-SCNC: 4.9 MMOL/L — SIGNIFICANT CHANGE UP (ref 3.5–5.3)
PROT SERPL-MCNC: 7.4 G/DL — SIGNIFICANT CHANGE UP (ref 6–8.3)
PROTHROM AB SERPL-ACNC: 12.2 SEC — SIGNIFICANT CHANGE UP (ref 10.5–13.4)
RBC # BLD: 2.75 M/UL — LOW (ref 3.8–5.2)
RBC # FLD: 14.9 % — HIGH (ref 10.3–14.5)
SODIUM SERPL-SCNC: 139 MMOL/L — SIGNIFICANT CHANGE UP (ref 135–145)
SPECIMEN SOURCE: SIGNIFICANT CHANGE UP
WBC # BLD: 9.25 K/UL — SIGNIFICANT CHANGE UP (ref 3.8–10.5)
WBC # FLD AUTO: 9.25 K/UL — SIGNIFICANT CHANGE UP (ref 3.8–10.5)

## 2022-09-14 PROCEDURE — 99222 1ST HOSP IP/OBS MODERATE 55: CPT

## 2022-09-14 RX ORDER — SENNA PLUS 8.6 MG/1
2 TABLET ORAL AT BEDTIME
Refills: 0 | Status: DISCONTINUED | OUTPATIENT
Start: 2022-09-14 | End: 2022-09-15

## 2022-09-14 RX ORDER — POVIDONE-IODINE 5 %
1 AEROSOL (ML) TOPICAL ONCE
Refills: 0 | Status: DISCONTINUED | OUTPATIENT
Start: 2022-09-14 | End: 2022-09-15

## 2022-09-14 RX ORDER — FUROSEMIDE 40 MG
40 TABLET ORAL ONCE
Refills: 0 | Status: COMPLETED | OUTPATIENT
Start: 2022-09-14 | End: 2022-09-14

## 2022-09-14 RX ORDER — CHLORHEXIDINE GLUCONATE 213 G/1000ML
1 SOLUTION TOPICAL EVERY 12 HOURS
Refills: 0 | Status: COMPLETED | OUTPATIENT
Start: 2022-09-14 | End: 2022-09-15

## 2022-09-14 RX ORDER — POLYETHYLENE GLYCOL 3350 17 G/17G
17 POWDER, FOR SOLUTION ORAL DAILY
Refills: 0 | Status: DISCONTINUED | OUTPATIENT
Start: 2022-09-14 | End: 2022-09-15

## 2022-09-14 RX ORDER — OXYCODONE HYDROCHLORIDE 5 MG/1
5 TABLET ORAL EVERY 6 HOURS
Refills: 0 | Status: DISCONTINUED | OUTPATIENT
Start: 2022-09-14 | End: 2022-09-15

## 2022-09-14 RX ADMIN — OXYCODONE HYDROCHLORIDE 5 MILLIGRAM(S): 5 TABLET ORAL at 13:46

## 2022-09-14 RX ADMIN — Medication 25 MILLIGRAM(S): at 17:48

## 2022-09-14 RX ADMIN — Medication 25 MILLIGRAM(S): at 05:59

## 2022-09-14 RX ADMIN — POLYETHYLENE GLYCOL 3350 17 GRAM(S): 17 POWDER, FOR SOLUTION ORAL at 17:48

## 2022-09-14 RX ADMIN — CHLORHEXIDINE GLUCONATE 1 APPLICATION(S): 213 SOLUTION TOPICAL at 17:49

## 2022-09-14 RX ADMIN — HEPARIN SODIUM 5000 UNIT(S): 5000 INJECTION INTRAVENOUS; SUBCUTANEOUS at 05:53

## 2022-09-14 RX ADMIN — SIMVASTATIN 40 MILLIGRAM(S): 20 TABLET, FILM COATED ORAL at 22:44

## 2022-09-14 RX ADMIN — OXYCODONE HYDROCHLORIDE 5 MILLIGRAM(S): 5 TABLET ORAL at 16:35

## 2022-09-14 RX ADMIN — HYDROMORPHONE HYDROCHLORIDE 0.5 MILLIGRAM(S): 2 INJECTION INTRAMUSCULAR; INTRAVENOUS; SUBCUTANEOUS at 05:50

## 2022-09-14 RX ADMIN — SENNA PLUS 2 TABLET(S): 8.6 TABLET ORAL at 22:44

## 2022-09-14 RX ADMIN — Medication 40 MILLIGRAM(S): at 13:46

## 2022-09-14 RX ADMIN — HYDROMORPHONE HYDROCHLORIDE 0.5 MILLIGRAM(S): 2 INJECTION INTRAMUSCULAR; INTRAVENOUS; SUBCUTANEOUS at 05:53

## 2022-09-14 NOTE — PATIENT PROFILE ADULT - HAVE YOU EXPERIENCED VIOLENCE OR A TRAUMATIC EVENT?
Pt A/O X4. Neuro checks q shift. RA. Denies SOB or difficulty breathing. O2 sats remained above 90%. A fib on tele. Denies pain. IV Rocephin. Pt is sleeping comfortably. Will continue to monitor.      CARDIOVASCULAR - ADULT    • Maintains optimal cardia no

## 2022-09-14 NOTE — PATIENT PROFILE ADULT - FALL HARM RISK - PATIENT NEEDS ASSISTANCE
COVID-19 screening completed at clinic entrance which included temporal temperature.   Patient afebrile. COVID-19 screening questions negative per patient.       Standing/Walking/Toileting

## 2022-09-14 NOTE — ED ADULT NURSE NOTE - PAIN: RADIATION
Post-Op Assessment Note    CV Status:  Stable  Pain Score: 0    Pain management: adequate     Mental Status:  Sleepy   Hydration Status:  Euvolemic   PONV Controlled:  None   Airway Patency:  Patent      Post Op Vitals Reviewed: Yes      Staff: CRNA         No complications documented      BP   134/71   Temp     Pulse  71   Resp   16   SpO2   92 hip

## 2022-09-14 NOTE — CONSULT NOTE ADULT - PROBLEM SELECTOR RECOMMENDATION 9
Pt with acute right hip pain which is somatic and neuropathic in nature due to right hip fracture. GOHCO. Plan for OR 9/14.   High risk medications reviewed. Avoid polypharmacy. Avoid IV opioids once postop. Avoid NSAIDs and benzodiazepines. Non-pharmacological sleep aides initiated. Non-opioid medications and non-pharmacological pain management measures initiated.   Preop:  - Continue dilaudid 0.5mg IV q6h PRN severe pain.  - Continue oxycodone 5mg PO q6h PRN moderate pain.   POST-OP:   Opioid pain recommendations   - Continue Oxycodone 2.5/5 mg PO q 4 hours PRN moderate/severe pain. Monitor for sedation/ respiratory depression.   Non-opioid pain recommendations   - Acetaminophen 1 gram PO q 8 hours x 4 days.   Bowel Regimen  - Miralax 17G PO daily  - Senna 2 tablets at bedtime for constipation  Mild pain   - Non-pharmacological pain treatment recommendations  - Warm/ Cool packs PRN   - Repositioning extremity, guided imagery, relaxation, distraction.  - Physical therapy OOB if no contraindications once postop  Recommendations discussed with primary team and RN.

## 2022-09-14 NOTE — PROGRESS NOTE ADULT - PROBLEM SELECTOR PLAN 1
CT pelvis confirmed: right intratrochanteric fracture as detailed above.   Moderate right adductor muscle hematoma.  - ortho on board   - plan for IM nailing of right hip today 9/14  - NPO, IV fluids   - pain management: Hydromorphone 0.5 mg  IV Push every 6 hours PRN Severe Pain   oxycodone 5 mg Oral every 6 hours PRN Moderate Pain

## 2022-09-14 NOTE — PROGRESS NOTE ADULT - PROBLEM SELECTOR PLAN 2
Patient planned for IM Pinning of Right Hip Fracture today   Due to urgent surgery, patient medically optimized for surgery, patient is an intermediate-risk for an intermediate risk procedure  COVID positive, no need for isolation, pt tested positive in July 2022,   as per infection control no need to isolate if less than 90 days since the last COVID infection

## 2022-09-14 NOTE — CONSULT NOTE ADULT - SUBJECTIVE AND OBJECTIVE BOX
Source of information: KALPANA DAVIDSON, Chart review  Patient language: Faroese  : n/a Family at bedside, translating for patient.    HPI:  Patient is a 89 yo Faroese speaking female (accompanied by her son Isac, who provides translation) with PMH HTN, anemia requiring Iron transfusions (last one before COVID), HLD BIBEMS to the ED several hours after a witnessed fall. According to patient, she tripped and fell backwards in her kitchen and home health aide caught the patient's back before it hit the ground patient landed on her R buttock but without striking her head. Patient denies any dizziness, weakness, visual or neurological symptoms before falling. Patient reports 8/10 sharp, throbbing pain on the right hip. Pain is non radiating and worsened with movement but made better with the morphine. Patient denies any syncope. Pt denies any CP, SOB, abdominal pain, nausea vomiting or changes in bowel habits. Patient denies fevers or chills. At baseline, patient can perform ADLs, walks with no devices, and can walk on a flat surface and incline with no symptoms.   (13 Sep 2022 17:27)      Pt with an acute right hip fracture. [facility]. Plan for OR . Pain consulted for right hip pain. Pt seen and examined at bedside. Family at bedside, translating for patient. Denies hip pain at this time. Reports current pain regimen effectively manages her pain. Denies lethargy, chest pain, SOB, nausea, vomiting, constipation, numbness/ tingling. Reports last BM . Patient stated goal for pain control: to be able to take deep breaths, get out of bed to chair and ambulate with tolerable pain control. Pt denies taking medications for pain at home.     PAST MEDICAL & SURGICAL HISTORY:  Hyperlipidemia      Arthritis      Anemia      Neuropathy      Hypertension      History of Cholecystectomy      Gall Bladder Disease  Cholecystectomy        Hip fracture  s/p surgery in 2015 -- pt&#x27;s son denies?          FAMILY HISTORY:  No pertinent family history in first degree relatives        Social History:  Patient lives with daughter, at baseline patient can walk on a flat surface without shortness of breath, can perform ADLS. Patient denies illicit drug use, ETOH or tobacco use. (13 Sep 2022 17:27)   [X ] Denies ETOH use, illicit drug use and smoking    Allergies    No Known Allergies    MEDICATIONS  (STANDING):  chlorhexidine 2% Cloths 1 Application(s) Topical every 12 hours  furosemide   Injectable 40 milliGRAM(s) IV Push once  metoprolol tartrate 25 milliGRAM(s) Oral two times a day  povidone iodine 5% Nasal Swab 1 Application(s) Both Nostrils once  simvastatin 40 milliGRAM(s) Oral at bedtime    MEDICATIONS  (PRN):  HYDROmorphone  Injectable 0.5 milliGRAM(s) IV Push every 6 hours PRN Severe Pain (7 - 10)  oxyCODONE    IR 5 milliGRAM(s) Oral every 6 hours PRN Moderate Pain (4 - 6)      Vital Signs Last 24 Hrs  T(C): 36.9 (14 Sep 2022 11:37), Max: 37.3 (14 Sep 2022 00:21)  T(F): 98.5 (14 Sep 2022 11:37), Max: 99.2 (14 Sep 2022 00:21)  HR: 94 (14 Sep 2022 11:37) (72 - 107)  BP: 177/68 (14 Sep 2022 11:37) (145/62 - 177/68)  BP(mean): --  RR: 16 (14 Sep 2022 11:37) (16 - 18)  SpO2: 96% (14 Sep 2022 11:37) (94% - 97%)    Parameters below as of 14 Sep 2022 11:37  Patient On (Oxygen Delivery Method): room air        LABS: Reviewed.                          8.6    9.25  )-----------( 204      ( 14 Sep 2022 12:22 )             27.4     09-14    139  |  105  |  22<H>  ----------------------------<  130<H>  4.9   |  27  |  1.25    Ca    9.5      14 Sep 2022 12:22  Phos  3.8     09-14  Mg     2.7     09-14    TPro  7.4  /  Alb  3.3<L>  /  TBili  0.5  /  DBili  x   /  AST  19  /  ALT  16  /  AlkPhos  82  09-14    PT/INR - ( 14 Sep 2022 12:22 )   PT: 12.2 sec;   INR: 1.02 ratio         PTT - ( 14 Sep 2022 12:22 )  PTT:27.7 sec  LIVER FUNCTIONS - ( 14 Sep 2022 12:22 )  Alb: 3.3 g/dL / Pro: 7.4 g/dL / ALK PHOS: 82 U/L / ALT: 16 U/L DA / AST: 19 U/L / GGT: x           Urinalysis Basic - ( 13 Sep 2022 21:20 )    Color: Yellow / Appearance: Clear / S.020 / pH: x  Gluc: x / Ketone: Trace  / Bili: Negative / Urobili: Negative   Blood: x / Protein: 30 mg/dL / Nitrite: Negative   Leuk Esterase: Negative / RBC: 5-10 /HPF / WBC 0-2 /HPF   Sq Epi: x / Non Sq Epi: Few /HPF / Bacteria: Few /HPF      CAPILLARY BLOOD GLUCOSE        COVID-19 PCR: Detected (13 Sep 2022 16:20)  COVID-19 PCR: Detected (2022 10:07)      Radiology: Reviewed.   < from: CT Pelvis Bony Only No Cont (22 @ 20:33) >    ACC: [id number] EXAM:  CT PELVIS BONY ONLY                          PROCEDURE DATE:  2022          INTERPRETATION:  CT PELVIS BONY dated 2022 8:33 PM    INDICATION: Pain after fall.    COMPARISON: Hip radiographs of the same date    TECHNIQUE: CT imaging of the pelvis was performed. The data was   reformatted in the axial, coronal, and sagittal planes. Additionally, 3-D   reformatted imaging was created.    FINDINGS:    OSSEOUS STRUCTURES: Acute comminuted intratrochanteric fracture with   impaction of the right proximal femur. There is involvement of the   femoral neck and mild extension into the subtrochanteric region. Mild   avulsion of the lesser trochanter. Mild displacement of the fracture   fragments. There are old fracturesof the left superior and inferior   pubic rami without osseous union appreciated. Narrowing the hip joint   spaces. No dislocation. Old left sacral alar fracture. Sclerosis and   degenerative changes of both sacroiliac joints.  SYNOVIUM/ JOINT FLUID:Small right hip effusion.  TENDONS: Calcifications at the proximal hamstring tendons bilaterally.   Suspect partial tearing of the gluteus medius tendon on the right.  MUSCLES: Moderate enlargement of the right adductor muscles in keeping   with intramuscular hematoma.  NEUROVASCULAR STRUCTURES: Vascular calcifications are noted.  INTRAPELVIC SOFT TISSUES: Guardado catheter and gas within a nondistended   urinary bladder.  SUBCUTANEOUS SOFT TISSUES: Mild soft tissue swelling about the pelvis.    3-Dreformatted imaging confirms these findings.    IMPRESSION:    1.  Comminuted right intratrochanteric fracture as detailed above.  2.  Moderate right adductor muscle hematoma.  3.  Old fractures of left superior and inferior pubic rami without   osseous healing.  4.  Old left sacral alar fracture deformity.  5.  Degenerative changes.    --- End of Report ---            JANA NICHOLAS MD; Attending Radiologist  This document has been electronically signed. Sep 13 2022  8:56PM    < end of copied text >      ORT Score -   Family Hx of substance abuse	Female	      Male  Alcohol 	                                           1                     3  Illegal drugs	                                   2                     3  Rx drugs                                           4 	                  4  Personal Hx of substance abuse		  Alcohol 	                                          3	                  3  Illegal drugs                                     4	                  4  Rx drugs                                            5 	                  5  Age between 16- 45 years	           1                     1  hx preadolescent sexual abuse	   3 	                  0  Psychological disease		  ADD, OCD, bipolar, schizophrenia   2	          2  Depression                                           1 	          1  Total: 0    a score of 3 or lower indicates low risk for opioid abuse		  a score of 4-7 indicates moderate risk for opioid abuse		  a score of 8 or higher indicates high risk for opioid abuse  	  REVIEW OF SYSTEMS:  CONSTITUTIONAL: No fever + fatigue  RESPIRATORY: No cough, wheezing, chills or hemoptysis; No shortness of breath  CARDIOVASCULAR: No chest pain, palpitations, dizziness, or leg swelling  GASTROINTESTINAL: No loss of appetite, decreased PO intake. No abdominal or epigastric pain. No nausea, vomiting; No diarrhea or constipation.   GENITOURINARY: No dysuria, frequency, hematuria, retention or incontinence  MUSCULOSKELETAL: + occasional right hip joint pain no swelling; No back pain, no upper or lower motor strength weakness, no saddle anesthesia, bowel/bladder incontinence, + falls   NEURO: No headaches, No numbness/tingling b/l LE, No weakness    PHYSICAL EXAM:  GENERAL:  Alert & Oriented X4, cooperative, NAD, Good concentration. Speech is clear.   RESPIRATORY: Respirations even and unlabored. Clear to auscultation bilaterally; No rales, rhonchi, wheezing, or rubs  CARDIOVASCULAR: Normal S1/S2, regular rate and rhythm; No murmurs, rubs, or gallops. No JVD.   GASTROINTESTINAL:  Soft, Nontender, Nondistended; Bowel sounds present  GENITOURINARY: Urinary catheter draining clear yellow urine   PERIPHERAL VASCULAR:  Extremities warm without edema. 2+ Peripheral Pulses, No cyanosis, No calf tenderness  MUSCULOSKELETAL: Motor Strength 4/5 B/L upper and 2/5  lower extremities; + decreased right hip ROM; + right hip tenderness on palpation   SKIN: Warm, dry, intact. No rashes, lesions, scars or wounds.     Risk factors associated with adverse outcomes related to opioid treatment  [ ]  Concurrent benzodiazepine use  [ ]  History/ Active substance use or alcohol use disorder  [ ] Psychiatric co-morbidity  [ ] Sleep apnea  [ ] COPD  [ ] BMI> 35  [ ] Liver dysfunction  [ ] Renal dysfunction  [ ] CHF  [ ] Smoker  [ X]  Age > 60 years    [X ]  [place]  Reviewed and Copied to Chart. See below.    Plan of care and goal oriented pain management treatment options were discussed with patient and /or primary care giver; all questions and concerns were addressed and care was aligned with patient's wishes.    Educated patient on goal oriented pain management treatment options        Source of information: KALPANA DAVIDSON, Chart review  Patient language: [place]  : n/a Family at bedside, translating for patient.    HPI:  Patient is a 89 yo [place] speaking female (accompanied by her son Isac, who provides translation) with PMH HTN, anemia requiring Iron transfusions (last one before COVID), HLD BIBEMS to the ED several hours after a witnessed fall. According to patient, she tripped and fell backwards in her kitchen and home health aide caught the patient's back before it hit the ground patient landed on her R buttock but without striking her head. Patient denies any dizziness, weakness, visual or neurological symptoms before falling. Patient reports 8/10 sharp, throbbing pain on the right hip. Pain is non radiating and worsened with movement but made better with the morphine. Patient denies any syncope. Pt denies any CP, SOB, abdominal pain, nausea vomiting or changes in bowel habits. Patient denies fevers or chills. At baseline, patient can perform ADLs, walks with no devices, and can walk on a flat surface and incline with no symptoms.   (13 Sep 2022 17:27)      Pt with an acute right hip fracture. [facility]. Plan for OR . Pain consulted for right hip pain. Pt seen and examined at bedside. Family at bedside, translating for patient. Denies hip pain at this time. Reports current pain regimen effectively manages her pain. Denies lethargy, chest pain, SOB, nausea, vomiting, numbness/ tingling. PT with hx constipation. Reports last BM . Patient stated goal for pain control: to be able to take deep breaths, get out of bed to chair and ambulate with tolerable pain control. Pt denies taking medications for pain at home.     PAST MEDICAL & SURGICAL HISTORY:  Hyperlipidemia      Arthritis      Anemia      Neuropathy      Hypertension      History of Cholecystectomy      Gall Bladder Disease  Cholecystectomy        Hip fracture  s/p surgery in 2015 -- pt&#x27;s son denies?          FAMILY HISTORY:  No pertinent family history in first degree relatives        Social History:  Patient lives with daughter, at baseline patient can walk on a flat surface without shortness of breath, can perform ADLS. Patient denies illicit drug use, ETOH or tobacco use. (13 Sep 2022 17:27)   [X ] Denies ETOH use, illicit drug use and smoking    Allergies    No Known Allergies    MEDICATIONS  (STANDING):  chlorhexidine 2% Cloths 1 Application(s) Topical every 12 hours  furosemide   Injectable 40 milliGRAM(s) IV Push once  metoprolol tartrate 25 milliGRAM(s) Oral two times a day  povidone iodine 5% Nasal Swab 1 Application(s) Both Nostrils once  simvastatin 40 milliGRAM(s) Oral at bedtime    MEDICATIONS  (PRN):  HYDROmorphone  Injectable 0.5 milliGRAM(s) IV Push every 6 hours PRN Severe Pain (7 - 10)  oxyCODONE    IR 5 milliGRAM(s) Oral every 6 hours PRN Moderate Pain (4 - 6)      Vital Signs Last 24 Hrs  T(C): 36.9 (14 Sep 2022 11:37), Max: 37.3 (14 Sep 2022 00:21)  T(F): 98.5 (14 Sep 2022 11:37), Max: 99.2 (14 Sep 2022 00:21)  HR: 94 (14 Sep 2022 11:37) (72 - 107)  BP: 177/68 (14 Sep 2022 11:37) (145/62 - 177/68)  BP(mean): --  RR: 16 (14 Sep 2022 11:37) (16 - 18)  SpO2: 96% (14 Sep 2022 11:37) (94% - 97%)    Parameters below as of 14 Sep 2022 11:37  Patient On (Oxygen Delivery Method): room air        LABS: Reviewed.                          8.6    9.25  )-----------( 204      ( 14 Sep 2022 12:22 )             27.4     09-14    139  |  105  |  22<H>  ----------------------------<  130<H>  4.9   |  27  |  1.25    Ca    9.5      14 Sep 2022 12:22  Phos  3.8     09-14  Mg     2.7     09-14    TPro  7.4  /  Alb  3.3<L>  /  TBili  0.5  /  DBili  x   /  AST  19  /  ALT  16  /  AlkPhos  82  09-14    PT/INR - ( 14 Sep 2022 12:22 )   PT: 12.2 sec;   INR: 1.02 ratio         PTT - ( 14 Sep 2022 12:22 )  PTT:27.7 sec  LIVER FUNCTIONS - ( 14 Sep 2022 12:22 )  Alb: 3.3 g/dL / Pro: 7.4 g/dL / ALK PHOS: 82 U/L / ALT: 16 U/L DA / AST: 19 U/L / GGT: x           Urinalysis Basic - ( 13 Sep 2022 21:20 )    Color: Yellow / Appearance: Clear / S.020 / pH: x  Gluc: x / Ketone: Trace  / Bili: Negative / Urobili: Negative   Blood: x / Protein: 30 mg/dL / Nitrite: Negative   Leuk Esterase: Negative / RBC: 5-10 /HPF / WBC 0-2 /HPF   Sq Epi: x / Non Sq Epi: Few /HPF / Bacteria: Few /HPF      CAPILLARY BLOOD GLUCOSE        COVID-19 PCR: Detected (13 Sep 2022 16:20)  COVID-19 PCR: Detected (2022 10:07)      Radiology: Reviewed.   < from: CT Pelvis Bony Only No Cont (22 @ 20:33) >    ACC: [id number] EXAM:  CT PELVIS BONY ONLY                          PROCEDURE DATE:  2022          INTERPRETATION:  CT PELVIS BONY dated 2022 8:33 PM    INDICATION: Pain after fall.    COMPARISON: Hip radiographs of the same date    TECHNIQUE: CT imaging of the pelvis was performed. The data was   reformatted in the axial, coronal, and sagittal planes. Additionally, 3-D   reformatted imaging was created.    FINDINGS:    OSSEOUS STRUCTURES: Acute comminuted intratrochanteric fracture with   impaction of the right proximal femur. There is involvement of the   femoral neck and mild extension into the subtrochanteric region. Mild   avulsion of the lesser trochanter. Mild displacement of the fracture   fragments. There are old fracturesof the left superior and inferior   pubic rami without osseous union appreciated. Narrowing the hip joint   spaces. No dislocation. Old left sacral alar fracture. Sclerosis and   degenerative changes of both sacroiliac joints.  SYNOVIUM/ JOINT FLUID:Small right hip effusion.  TENDONS: Calcifications at the proximal hamstring tendons bilaterally.   Suspect partial tearing of the gluteus medius tendon on the right.  MUSCLES: Moderate enlargement of the right adductor muscles in keeping   with intramuscular hematoma.  NEUROVASCULAR STRUCTURES: Vascular calcifications are noted.  INTRAPELVIC SOFT TISSUES: Guardado catheter and gas within a nondistended   urinary bladder.  SUBCUTANEOUS SOFT TISSUES: Mild soft tissue swelling about the pelvis.    3-Dreformatted imaging confirms these findings.    IMPRESSION:    1.  Comminuted right intratrochanteric fracture as detailed above.  2.  Moderate right adductor muscle hematoma.  3.  Old fractures of left superior and inferior pubic rami without   osseous healing.  4.  Old left sacral alar fracture deformity.  5.  Degenerative changes.    --- End of Report ---            JANA NICHOLAS MD; Attending Radiologist  This document has been electronically signed. Sep 13 2022  8:56PM    < end of copied text >      ORT Score -   Family Hx of substance abuse	Female	      Male  Alcohol 	                                           1                     3  Illegal drugs	                                   2                     3  Rx drugs                                           4 	                  4  Personal Hx of substance abuse		  Alcohol 	                                          3	                  3  Illegal drugs                                     4	                  4  Rx drugs                                            5 	                  5  Age between 16- 45 years	           1                     1  hx preadolescent sexual abuse	   3 	                  0  Psychological disease		  ADD, OCD, bipolar, schizophrenia   2	          2  Depression                                           1 	          1  Total: 0    a score of 3 or lower indicates low risk for opioid abuse		  a score of 4-7 indicates moderate risk for opioid abuse		  a score of 8 or higher indicates high risk for opioid abuse  	  REVIEW OF SYSTEMS:  CONSTITUTIONAL: No fever + fatigue  RESPIRATORY: No cough, wheezing, chills or hemoptysis; No shortness of breath  CARDIOVASCULAR: No chest pain, palpitations, dizziness, or leg swelling  GASTROINTESTINAL: No loss of appetite, decreased PO intake. No abdominal or epigastric pain. No nausea, vomiting; No diarrhea + hx constipation.   GENITOURINARY: No dysuria, frequency, hematuria, retention or incontinence  MUSCULOSKELETAL: + occasional right hip joint pain no swelling; No back pain, no upper or lower motor strength weakness, no saddle anesthesia, bowel/bladder incontinence, + falls   NEURO: No headaches, No numbness/tingling b/l LE, No weakness    PHYSICAL EXAM:  GENERAL:  Alert & Oriented X4, cooperative, NAD, Good concentration. Speech is clear.   RESPIRATORY: Respirations even and unlabored. Clear to auscultation bilaterally; No rales, rhonchi, wheezing, or rubs  CARDIOVASCULAR: Normal S1/S2, regular rate and rhythm; No murmurs, rubs, or gallops. No JVD.   GASTROINTESTINAL:  Soft, Nontender, Nondistended; Bowel sounds present  GENITOURINARY: Urinary catheter draining clear yellow urine   PERIPHERAL VASCULAR:  Extremities warm without edema. 2+ Peripheral Pulses, No cyanosis, No calf tenderness  MUSCULOSKELETAL: Motor Strength 4/5 B/L upper and 2/5  lower extremities; + decreased right hip ROM; + right hip tenderness on palpation   SKIN: Warm, dry, intact. No rashes, lesions, scars or wounds.     Risk factors associated with adverse outcomes related to opioid treatment  [ ]  Concurrent benzodiazepine use  [ ]  History/ Active substance use or alcohol use disorder  [ ] Psychiatric co-morbidity  [ ] Sleep apnea  [ ] COPD  [ ] BMI> 35  [ ] Liver dysfunction  [ ] Renal dysfunction  [ ] CHF  [ ] Smoker  [ X]  Age > 60 years    [X ]  [place]  Reviewed and Copied to Chart. See below.    Plan of care and goal oriented pain management treatment options were discussed with patient and /or primary care giver; all questions and concerns were addressed and care was aligned with patient's wishes.    Educated patient on goal oriented pain management treatment options

## 2022-09-14 NOTE — PROGRESS NOTE ADULT - SUBJECTIVE AND OBJECTIVE BOX
NP Note discussed with  primary attending    Patient is a 89y old  Female who presents with a chief complaint of Right Hip fracture (13 Sep 2022 17:54)      INTERVAL HPI/OVERNIGHT EVENTS: no new complaints    MEDICATIONS  (STANDING):  metoprolol tartrate 25 milliGRAM(s) Oral two times a day  simvastatin 40 milliGRAM(s) Oral at bedtime    MEDICATIONS  (PRN):  HYDROmorphone  Injectable 0.5 milliGRAM(s) IV Push every 6 hours PRN Severe Pain (7 - 10)  oxyCODONE    IR 5 milliGRAM(s) Oral every 6 hours PRN Moderate Pain (4 - 6)      __________________________________________________  REVIEW OF SYSTEMS:    CONSTITUTIONAL: No fever,   RESPIRATORY: No cough; No shortness of breath  CARDIOVASCULAR: No chest pain, no palpitations  GASTROINTESTINAL: No pain. No nausea or vomiting; No diarrhea   NEUROLOGICAL: No headache or numbness, no tremors  MUSCULOSKELETAL: right hip pain   GENITOURINARY: no dysuria, no frequency, no hesitancy        Vital Signs Last 24 Hrs  T(C): 36.7 (14 Sep 2022 07:07), Max: 37.3 (14 Sep 2022 00:21)  T(F): 98 (14 Sep 2022 07:07), Max: 99.2 (14 Sep 2022 00:21)  HR: 101 (14 Sep 2022 07:07) (72 - 107)  BP: 155/55 (14 Sep 2022 07:07) (145/62 - 205/72)  BP(mean): --  RR: 18 (14 Sep 2022 07:07) (18 - 18)  SpO2: 97% (14 Sep 2022 07:07) (96% - 98%)    Parameters below as of 14 Sep 2022 07:07  Patient On (Oxygen Delivery Method): room air        ________________________________________________  PHYSICAL EXAM:  GENERAL: NAD  CHEST/LUNG: Clear to ausculitation bilaterally   HEART: S1 S2  regular; no murmurs, gallops or rubs  ABDOMEN: Soft, Nontender, Nondistended; Bowel sounds present  EXTREMITIES: no cyanosis; no edema; no calf tenderness. Right leg externally rotated, ttp and ecchymosis over right hip  SKIN: warm and dry; no rash  NERVOUS SYSTEM:  Awake and alert; Oriented  to place, person and time ; no new deficits    _________________________________________________  LABS:                        8.2    6.01  )-----------( 205      ( 13 Sep 2022 13:12 )             27.0         138  |  105  |  25<H>  ----------------------------<  130<H>  4.6   |  24  |  1.31<H>    Ca    9.8      13 Sep 2022 13:12  Mg     2.4         TPro  7.5  /  Alb  3.6  /  TBili  0.4  /  DBili  x   /  AST  19  /  ALT  15  /  AlkPhos  81  -13    PT/INR - ( 13 Sep 2022 13:12 )   PT: 11.2 sec;   INR: 0.94 ratio         PTT - ( 13 Sep 2022 13:12 )  PTT:29.1 sec  Urinalysis Basic - ( 13 Sep 2022 21:20 )    Color: Yellow / Appearance: Clear / S.020 / pH: x  Gluc: x / Ketone: Trace  / Bili: Negative / Urobili: Negative   Blood: x / Protein: 30 mg/dL / Nitrite: Negative   Leuk Esterase: Negative / RBC: 5-10 /HPF / WBC 0-2 /HPF   Sq Epi: x / Non Sq Epi: Few /HPF / Bacteria: Few /HPF      CAPILLARY BLOOD GLUCOSE            RADIOLOGY & ADDITIONAL TESTS:    Imaging Personally Reviewed:  YES/NO    Consultant(s) Notes Reviewed:   YES/ No    Care Discussed with Consultants :     Plan of care was discussed with patient and /or primary care giver; all questions and concerns were addressed and care was aligned with patient's wishes.     NP Note discussed with  primary attending    Patient is a 89y old  Female who presents with a chief complaint of Right Hip fracture (13 Sep 2022 17:54)      INTERVAL HPI/OVERNIGHT EVENTS: no new complaints    MEDICATIONS  (STANDING):  metoprolol tartrate 25 milliGRAM(s) Oral two times a day  simvastatin 40 milliGRAM(s) Oral at bedtime    MEDICATIONS  (PRN):  HYDROmorphone  Injectable 0.5 milliGRAM(s) IV Push every 6 hours PRN Severe Pain (7 - 10)  oxyCODONE    IR 5 milliGRAM(s) Oral every 6 hours PRN Moderate Pain (4 - 6)      __________________________________________________  REVIEW OF SYSTEMS:    CONSTITUTIONAL: No fever,   RESPIRATORY: No cough; No shortness of breath  CARDIOVASCULAR: No chest pain, no palpitations  GASTROINTESTINAL: No pain. No nausea or vomiting; No diarrhea   NEUROLOGICAL: No headache or numbness, no tremors  MUSCULOSKELETAL: right hip pain   GENITOURINARY: no dysuria, no frequency, no hesitancy        Vital Signs Last 24 Hrs  T(C): 36.7 (14 Sep 2022 07:07), Max: 37.3 (14 Sep 2022 00:21)  T(F): 98 (14 Sep 2022 07:07), Max: 99.2 (14 Sep 2022 00:21)  HR: 101 (14 Sep 2022 07:07) (72 - 107)  BP: 155/55 (14 Sep 2022 07:07) (145/62 - 205/72)  BP(mean): --  RR: 18 (14 Sep 2022 07:07) (18 - 18)  SpO2: 97% (14 Sep 2022 07:07) (96% - 98%)    Parameters below as of 14 Sep 2022 07:07  Patient On (Oxygen Delivery Method): room air        ________________________________________________  PHYSICAL EXAM:  GENERAL: NAD  CHEST/LUNG: Clear to ausculitation bilaterally   HEART: S1 S2  regular; no murmurs, gallops or rubs  ABDOMEN: Soft, Nontender, Nondistended; Bowel sounds present  EXTREMITIES: no cyanosis; no edema; no calf tenderness. Right leg externally rotated, ttp and over right hip  SKIN: warm and dry; no rash  NERVOUS SYSTEM:  Awake and alert; Oriented  to place, person and time ; no new deficits    _________________________________________________  LABS:                        8.2    6.01  )-----------( 205      ( 13 Sep 2022 13:12 )             27.0         138  |  105  |  25<H>  ----------------------------<  130<H>  4.6   |  24  |  1.31<H>    Ca    9.8      13 Sep 2022 13:12  Mg     2.4         TPro  7.5  /  Alb  3.6  /  TBili  0.4  /  DBili  x   /  AST  19  /  ALT  15  /  AlkPhos  81  -13    PT/INR - ( 13 Sep 2022 13:12 )   PT: 11.2 sec;   INR: 0.94 ratio         PTT - ( 13 Sep 2022 13:12 )  PTT:29.1 sec  Urinalysis Basic - ( 13 Sep 2022 21:20 )    Color: Yellow / Appearance: Clear / S.020 / pH: x  Gluc: x / Ketone: Trace  / Bili: Negative / Urobili: Negative   Blood: x / Protein: 30 mg/dL / Nitrite: Negative   Leuk Esterase: Negative / RBC: 5-10 /HPF / WBC 0-2 /HPF   Sq Epi: x / Non Sq Epi: Few /HPF / Bacteria: Few /HPF      CAPILLARY BLOOD GLUCOSE            RADIOLOGY & ADDITIONAL TESTS:    Imaging Personally Reviewed:  YES/NO    Consultant(s) Notes Reviewed:   YES/ No    Care Discussed with Consultants :     Plan of care was discussed with patient and /or primary care giver; all questions and concerns were addressed and care was aligned with patient's wishes.

## 2022-09-14 NOTE — PATIENT PROFILE ADULT - FALL HARM RISK - HARM RISK INTERVENTIONS
Assistance with ambulation/Assistance OOB with selected safe patient handling equipment/Communicate Risk of Fall with Harm to all staff/Discuss with provider need for PT consult/Monitor gait and stability/Reinforce activity limits and safety measures with patient and family/Tailored Fall Risk Interventions/Visual Cue: Yellow wristband and red socks/Bed in lowest position, wheels locked, appropriate side rails in place/Call bell, personal items and telephone in reach/Instruct patient to call for assistance before getting out of bed or chair/Non-slip footwear when patient is out of bed/[place] to call system/Physically safe environment - no spills, clutter or unnecessary equipment/Purposeful Proactive Rounding/Room/bathroom lighting operational, light cord in reach

## 2022-09-14 NOTE — PROGRESS NOTE ADULT - SUBJECTIVE AND OBJECTIVE BOX
[facility]  [id number]    Diagnosis:  R Hip Intertrochanteric Fracture     89yFemale    Patient was seen and evaluated at bedside w/daughter at the bedside. Patient with no acute complaints.   Pain is well controlled to the RLE.   Denies CP/SOB, dyspnea, paresthesias, N/V/D, palpitations.     Vital Signs Last 24 Hrs  T(C): 36.7 (14 Sep 2022 07:07), Max: 37.3 (14 Sep 2022 00:21)  T(F): 98 (14 Sep 2022 07:07), Max: 99.2 (14 Sep 2022 00:21)  HR: 101 (14 Sep 2022 07:07) (72 - 107)  BP: 155/55 (14 Sep 2022 07:07) (145/62 - 205/72)  BP(mean): --  RR: 18 (14 Sep 2022 07:07) (18 - 18)  SpO2: 97% (14 Sep 2022 07:07) (96% - 98%)    Parameters below as of 14 Sep 2022 07:07  Patient On (Oxygen Delivery Method): room air      I&O's Detail    13 Sep 2022 07:01  -  14 Sep 2022 07:00  --------------------------------------------------------  IN:  Total IN: 0 mL    OUT:    Indwelling Catheter - Urethral (mL): 600 mL  Total OUT: 600 mL    Total NET: -600 mL    Physical Exam:    General: AAOx3, NAD, resting comfortably in bed.    R Hip: Leg is flexed,shortened and externally rotated, Skin is pink and warm. Tender at the fracture site. Decreased ROM of the affected hip due to pain. SILT.  Positive logroll test.  Lower extremity:  No calf tenderness, calves are soft. 2+pulses. NVI. (+)EHL/FHL/ADF/APF.  Good capillary refill. Warm, well-perfused. SILT.                          8.2    6.01  )-----------( 205      ( 13 Sep 2022 13:12 )             27.0     09-13    138  |  105  |  25<H>  ----------------------------<  130<H>  4.6   |  24  |  1.31<H>    Ca    9.8      13 Sep 2022 13:12  Mg     2.4         TPro  7.5  /  Alb  3.6  /  TBili  0.4  /  DBili  x   /  AST  19  /  ALT  15  /  AlkPhos  81      PT/INR - ( 13 Sep 2022 13:12 )   PT: 11.2 sec;   INR: 0.94 ratio         PTT - ( 13 Sep 2022 13:12 )  PTT:29.1 sec  Urinalysis Basic - ( 13 Sep 2022 21:20 )    Color: Yellow / Appearance: Clear / S.020 / pH: x  Gluc: x / Ketone: Trace  / Bili: Negative / Urobili: Negative   Blood: x / Protein: 30 mg/dL / Nitrite: Negative   Leuk Esterase: Negative / RBC: 5-10 /HPF / WBC 0-2 /HPF   Sq Epi: x / Non Sq Epi: Few /HPF / Bacteria: Few /HPF          Impression:  89 y/o F w/ R Hip Intertrochanteric Fracture    Plan:  -  Pain control  -  DVT prophylaxis with Venodynes  -  Keep NPO after midnight tonight  -  Surgeon:  Dr. Encinas  -  Procedure:  R hip IM Nailing  -  For Surgery today  -  Medical Optimization  -  Consent: Pending  -  Case d/w Dr. Encinas and medical team- will await AM CBC to monitor H/H

## 2022-09-14 NOTE — PROGRESS NOTE ADULT - PROBLEM SELECTOR PLAN 3
hx of anemia requiring iron transfusions   hgb on admission 8.2  has right adductor hematoma  - will transfuse 2 units PRBCs as per ortho with goal hgb 10  - post transfusion cbc   - monitor hgb

## 2022-09-14 NOTE — PROGRESS NOTE ADULT - NS ATTEND AMEND GEN_ALL_CORE FT
Patient was seen and examined at bedside, denies any complains when resting Patient was seen and examined at bedside, denies any complains when resting but complains of pain with movement. Son at bedside helped with translation. Although denies any heart condition reports she has "water in heart" and takes water pill.    ICU Vital Signs Last 24 Hrs  T(C): 37.3 (14 Sep 2022 15:29), Max: 37.3 (14 Sep 2022 00:21)  T(F): 99.2 (14 Sep 2022 15:29), Max: 99.2 (14 Sep 2022 00:21)  HR: 93 (14 Sep 2022 15:29) (72 - 107)  BP: 178/69 (14 Sep 2022 15:29) (145/62 - 178/69)  BP(mean): 95 (14 Sep 2022 15:29) (95 - 95)  ABP: --  ABP(mean): --  RR: 18 (14 Sep 2022 15:29) (16 - 18)  SpO2: 97% (14 Sep 2022 15:29) (94% - 97%)    O2 Parameters below as of 14 Sep 2022 15:29  Patient On (Oxygen Delivery Method): room air      P/E:  NAD  AAOx1-2, no focal deficit, poor effort due to pain   CTABL  S1S2 WNL, no MRG  Abd soft, non tender, BS present   RLE flexed, no edema or calf tenderness     labs noted     A/P:  Right hip fracture  Rt adductor muscle hematoma   Pre-op eval   Chronic Iron deficiency anemia   HTN   Stage 3 CKD  ?HF  HLD    Plan:   Patient not in acute exacerbation of CHF, CXR clear. Still will obtain Cardiology recommendations since ortho is anticipating blood loss during surgery. Dr. Dorsey consulted, TTE obtained. Obtain EKG  Agree with two units of PRBC transfusion before surgery. Lasix 40mg before 2nd unit, f/u post transfusion CBC  Repeat CT pelvis if Hb does not improve to evaluate the size of hematoma   Cont BB with holding parameter   Pain management with bowel regimen   Monitor renal function   Rest of the management as above

## 2022-09-14 NOTE — PROGRESS NOTE ADULT - ASSESSMENT
89 y/o female with PMHx of HTN, HLD presented with right hip pain, sp fall  backwards at home.   In ED:  Xray showed right displaced hip fracture.  CXR with clear lungs.  Creatinine  1.3 (similar to creatinine from 2 months ago)   Found to be positivefor COVID:  asymptomatic-- prior COVID positive test in July.   Admitted for right intertrochanteric femoral fracture    CT pelvis confirmed:  Comminuted right intratrochanteric fracture as detailed above. Moderate right adductor muscle hematoma.  Old fractures of left superior and inferior pubic rami without   osseous healing.  Pt was evaluated by ortho team with the plan for right hip IM nailing     Pt seen at bedside, NAD, co pain to right hip, NPO for OR today

## 2022-09-15 ENCOUNTER — TRANSCRIPTION ENCOUNTER (OUTPATIENT)
Age: 87
End: 2022-09-15

## 2022-09-15 DIAGNOSIS — D72.829 ELEVATED WHITE BLOOD CELL COUNT, UNSPECIFIED: ICD-10-CM

## 2022-09-15 LAB
ANION GAP SERPL CALC-SCNC: 11 MMOL/L — SIGNIFICANT CHANGE UP (ref 5–17)
ANION GAP SERPL CALC-SCNC: 9 MMOL/L — SIGNIFICANT CHANGE UP (ref 5–17)
BUN SERPL-MCNC: 24 MG/DL — HIGH (ref 7–18)
BUN SERPL-MCNC: 30 MG/DL — HIGH (ref 7–18)
CALCIUM SERPL-MCNC: 8.6 MG/DL — SIGNIFICANT CHANGE UP (ref 8.4–10.5)
CALCIUM SERPL-MCNC: 9.3 MG/DL — SIGNIFICANT CHANGE UP (ref 8.4–10.5)
CHLORIDE SERPL-SCNC: 100 MMOL/L — SIGNIFICANT CHANGE UP (ref 96–108)
CHLORIDE SERPL-SCNC: 100 MMOL/L — SIGNIFICANT CHANGE UP (ref 96–108)
CO2 SERPL-SCNC: 26 MMOL/L — SIGNIFICANT CHANGE UP (ref 22–31)
CO2 SERPL-SCNC: 27 MMOL/L — SIGNIFICANT CHANGE UP (ref 22–31)
CREAT SERPL-MCNC: 1.31 MG/DL — HIGH (ref 0.5–1.3)
CREAT SERPL-MCNC: 1.77 MG/DL — HIGH (ref 0.5–1.3)
EGFR: 27 ML/MIN/1.73M2 — LOW
EGFR: 38 ML/MIN/1.73M2 — LOW
GLUCOSE SERPL-MCNC: 151 MG/DL — HIGH (ref 70–99)
GLUCOSE SERPL-MCNC: 166 MG/DL — HIGH (ref 70–99)
HCT VFR BLD CALC: 32.5 % — LOW (ref 34.5–45)
HCT VFR BLD CALC: 35.9 % — SIGNIFICANT CHANGE UP (ref 34.5–45)
HGB BLD-MCNC: 10.6 G/DL — LOW (ref 11.5–15.5)
HGB BLD-MCNC: 11.8 G/DL — SIGNIFICANT CHANGE UP (ref 11.5–15.5)
MCHC RBC-ENTMCNC: 31.4 PG — SIGNIFICANT CHANGE UP (ref 27–34)
MCHC RBC-ENTMCNC: 31.7 PG — SIGNIFICANT CHANGE UP (ref 27–34)
MCHC RBC-ENTMCNC: 32.6 GM/DL — SIGNIFICANT CHANGE UP (ref 32–36)
MCHC RBC-ENTMCNC: 32.9 GM/DL — SIGNIFICANT CHANGE UP (ref 32–36)
MCV RBC AUTO: 95.5 FL — SIGNIFICANT CHANGE UP (ref 80–100)
MCV RBC AUTO: 97.3 FL — SIGNIFICANT CHANGE UP (ref 80–100)
NRBC # BLD: 0 /100 WBCS — SIGNIFICANT CHANGE UP (ref 0–0)
NRBC # BLD: 0 /100 WBCS — SIGNIFICANT CHANGE UP (ref 0–0)
PLATELET # BLD AUTO: 188 K/UL — SIGNIFICANT CHANGE UP (ref 150–400)
PLATELET # BLD AUTO: 195 K/UL — SIGNIFICANT CHANGE UP (ref 150–400)
POTASSIUM SERPL-MCNC: 4.4 MMOL/L — SIGNIFICANT CHANGE UP (ref 3.5–5.3)
POTASSIUM SERPL-MCNC: 4.4 MMOL/L — SIGNIFICANT CHANGE UP (ref 3.5–5.3)
POTASSIUM SERPL-SCNC: 4.4 MMOL/L — SIGNIFICANT CHANGE UP (ref 3.5–5.3)
POTASSIUM SERPL-SCNC: 4.4 MMOL/L — SIGNIFICANT CHANGE UP (ref 3.5–5.3)
RBC # BLD: 3.34 M/UL — LOW (ref 3.8–5.2)
RBC # BLD: 3.76 M/UL — LOW (ref 3.8–5.2)
RBC # FLD: 15.7 % — HIGH (ref 10.3–14.5)
RBC # FLD: 15.7 % — HIGH (ref 10.3–14.5)
SODIUM SERPL-SCNC: 136 MMOL/L — SIGNIFICANT CHANGE UP (ref 135–145)
SODIUM SERPL-SCNC: 137 MMOL/L — SIGNIFICANT CHANGE UP (ref 135–145)
WBC # BLD: 17.05 K/UL — HIGH (ref 3.8–10.5)
WBC # BLD: 22.06 K/UL — HIGH (ref 3.8–10.5)
WBC # FLD AUTO: 17.05 K/UL — HIGH (ref 3.8–10.5)
WBC # FLD AUTO: 22.06 K/UL — HIGH (ref 3.8–10.5)

## 2022-09-15 PROCEDURE — 93306 TTE W/DOPPLER COMPLETE: CPT | Mod: 26

## 2022-09-15 PROCEDURE — 99232 SBSQ HOSP IP/OBS MODERATE 35: CPT

## 2022-09-15 PROCEDURE — 99233 SBSQ HOSP IP/OBS HIGH 50: CPT | Mod: GC

## 2022-09-15 DEVICE — GUIDEWIRE BALL TIP 3MM X 1000MM FOR T2 R1.5 FEMORAL NAILING SYSTEM: Type: IMPLANTABLE DEVICE | Site: RIGHT | Status: FUNCTIONAL

## 2022-09-15 DEVICE — SCREW LAG GAMMA 10.5X105MM: Type: IMPLANTABLE DEVICE | Site: RIGHT | Status: FUNCTIONAL

## 2022-09-15 DEVICE — SCREW LOCKING FT T2 5X45MM VIT: Type: IMPLANTABLE DEVICE | Site: RIGHT | Status: FUNCTIONAL

## 2022-09-15 DEVICE — K-WIRE STRYKER 3.2M X 450MM: Type: IMPLANTABLE DEVICE | Site: RIGHT | Status: FUNCTIONAL

## 2022-09-15 DEVICE — NAIL FEM GAMMA3 LNG 125 DEG 10X360MM RT: Type: IMPLANTABLE DEVICE | Site: RIGHT | Status: FUNCTIONAL

## 2022-09-15 DEVICE — SCREW LOK F/T T2 5X50MM VIT STRL: Type: IMPLANTABLE DEVICE | Site: RIGHT | Status: FUNCTIONAL

## 2022-09-15 RX ORDER — MAGNESIUM HYDROXIDE 400 MG/1
30 TABLET, CHEWABLE ORAL DAILY
Refills: 0 | Status: DISCONTINUED | OUTPATIENT
Start: 2022-09-15 | End: 2022-09-16

## 2022-09-15 RX ORDER — ACETAMINOPHEN 500 MG
1000 TABLET ORAL ONCE
Refills: 0 | Status: COMPLETED | OUTPATIENT
Start: 2022-09-15 | End: 2022-09-15

## 2022-09-15 RX ORDER — OXYCODONE HYDROCHLORIDE 5 MG/1
5 TABLET ORAL EVERY 4 HOURS
Refills: 0 | Status: DISCONTINUED | OUTPATIENT
Start: 2022-09-15 | End: 2022-09-20

## 2022-09-15 RX ORDER — FENTANYL CITRATE 50 UG/ML
25 INJECTION INTRAVENOUS
Refills: 0 | Status: DISCONTINUED | OUTPATIENT
Start: 2022-09-15 | End: 2022-09-15

## 2022-09-15 RX ORDER — METOCLOPRAMIDE HCL 10 MG
10 TABLET ORAL ONCE
Refills: 0 | Status: DISCONTINUED | OUTPATIENT
Start: 2022-09-15 | End: 2022-09-20

## 2022-09-15 RX ORDER — SODIUM CHLORIDE 9 MG/ML
1000 INJECTION INTRAMUSCULAR; INTRAVENOUS; SUBCUTANEOUS
Refills: 0 | Status: DISCONTINUED | OUTPATIENT
Start: 2022-09-15 | End: 2022-09-17

## 2022-09-15 RX ORDER — SENNA PLUS 8.6 MG/1
2 TABLET ORAL AT BEDTIME
Refills: 0 | Status: DISCONTINUED | OUTPATIENT
Start: 2022-09-15 | End: 2022-09-20

## 2022-09-15 RX ORDER — POLYETHYLENE GLYCOL 3350 17 G/17G
17 POWDER, FOR SOLUTION ORAL DAILY
Refills: 0 | Status: DISCONTINUED | OUTPATIENT
Start: 2022-09-15 | End: 2022-09-16

## 2022-09-15 RX ORDER — ENOXAPARIN SODIUM 100 MG/ML
30 INJECTION SUBCUTANEOUS EVERY 24 HOURS
Refills: 0 | Status: DISCONTINUED | OUTPATIENT
Start: 2022-09-16 | End: 2022-09-20

## 2022-09-15 RX ORDER — CEFAZOLIN SODIUM 1 G
2000 VIAL (EA) INJECTION EVERY 8 HOURS
Refills: 0 | Status: COMPLETED | OUTPATIENT
Start: 2022-09-15 | End: 2022-09-16

## 2022-09-15 RX ORDER — SIMVASTATIN 20 MG/1
40 TABLET, FILM COATED ORAL AT BEDTIME
Refills: 0 | Status: DISCONTINUED | OUTPATIENT
Start: 2022-09-15 | End: 2022-09-20

## 2022-09-15 RX ORDER — METOPROLOL TARTRATE 50 MG
25 TABLET ORAL
Refills: 0 | Status: DISCONTINUED | OUTPATIENT
Start: 2022-09-15 | End: 2022-09-20

## 2022-09-15 RX ORDER — ACETAMINOPHEN 500 MG
1000 TABLET ORAL EVERY 8 HOURS
Refills: 0 | Status: COMPLETED | OUTPATIENT
Start: 2022-09-15 | End: 2022-09-18

## 2022-09-15 RX ORDER — ONDANSETRON 8 MG/1
4 TABLET, FILM COATED ORAL EVERY 6 HOURS
Refills: 0 | Status: DISCONTINUED | OUTPATIENT
Start: 2022-09-15 | End: 2022-09-20

## 2022-09-15 RX ORDER — OXYCODONE HYDROCHLORIDE 5 MG/1
2.5 TABLET ORAL EVERY 4 HOURS
Refills: 0 | Status: DISCONTINUED | OUTPATIENT
Start: 2022-09-15 | End: 2022-09-20

## 2022-09-15 RX ADMIN — Medication 25 MILLIGRAM(S): at 05:08

## 2022-09-15 RX ADMIN — HYDROMORPHONE HYDROCHLORIDE 0.5 MILLIGRAM(S): 2 INJECTION INTRAMUSCULAR; INTRAVENOUS; SUBCUTANEOUS at 01:41

## 2022-09-15 RX ADMIN — HYDROMORPHONE HYDROCHLORIDE 0.5 MILLIGRAM(S): 2 INJECTION INTRAMUSCULAR; INTRAVENOUS; SUBCUTANEOUS at 00:36

## 2022-09-15 RX ADMIN — Medication 1000 MILLIGRAM(S): at 21:23

## 2022-09-15 RX ADMIN — Medication 1000 MILLIGRAM(S): at 22:11

## 2022-09-15 RX ADMIN — SODIUM CHLORIDE 100 MILLILITER(S): 9 INJECTION INTRAMUSCULAR; INTRAVENOUS; SUBCUTANEOUS at 16:20

## 2022-09-15 RX ADMIN — SENNA PLUS 2 TABLET(S): 8.6 TABLET ORAL at 21:24

## 2022-09-15 RX ADMIN — SIMVASTATIN 40 MILLIGRAM(S): 20 TABLET, FILM COATED ORAL at 21:24

## 2022-09-15 RX ADMIN — Medication 100 MILLIGRAM(S): at 19:11

## 2022-09-15 RX ADMIN — CHLORHEXIDINE GLUCONATE 1 APPLICATION(S): 213 SOLUTION TOPICAL at 05:08

## 2022-09-15 RX ADMIN — Medication 400 MILLIGRAM(S): at 22:12

## 2022-09-15 RX ADMIN — OXYCODONE HYDROCHLORIDE 5 MILLIGRAM(S): 5 TABLET ORAL at 16:20

## 2022-09-15 RX ADMIN — OXYCODONE HYDROCHLORIDE 5 MILLIGRAM(S): 5 TABLET ORAL at 17:47

## 2022-09-15 RX ADMIN — Medication 1000 MILLIGRAM(S): at 22:26

## 2022-09-15 NOTE — PROGRESS NOTE ADULT - PROBLEM SELECTOR PLAN 1
CT pelvis confirmed: right intratrochanteric fracture as detailed above.   Moderate right adductor muscle hematoma.  - ortho on board   - plan for IM nailing of right hip today 9/15  - NPO, IV fluids   - pain management: Hydromorphone 0.5 mg  IV Push every 6 hours PRN Severe Pain   oxycodone 5 mg Oral every 6 hours PRN Moderate Pain

## 2022-09-15 NOTE — PROGRESS NOTE ADULT - SUBJECTIVE AND OBJECTIVE BOX
Source of information: KALPANA DAVIDSON, Chart review  Patient language: Slovenian  : n/a Family at bedside, translating for patient.    HPI:  Patient is a 89 yo Slovenian speaking female (accompanied by her son Isac, who provides translation) with PMH HTN, anemia requiring Iron transfusions (last one before COVID), HLD BIBEMS to the ED several hours after a witnessed fall. According to patient, she tripped and fell backwards in her kitchen and home health aide caught the patient's back before it hit the ground patient landed on her R buttock but without striking her head. Patient denies any dizziness, weakness, visual or neurological symptoms before falling. Patient reports 8/10 sharp, throbbing pain on the right hip. Pain is non radiating and worsened with movement but made better with the morphine. Patient denies any syncope. Pt denies any CP, SOB, abdominal pain, nausea vomiting or changes in bowel habits. Patient denies fevers or chills. At baseline, patient can perform ADLs, walks with no devices, and can walk on a flat surface and incline with no symptoms.   (13 Sep 2022 17:27)      Pt with an acute right hip fracture s/p Insertion of locking intramedullary lupe into right hip on  POD #0. GOHCO. Pain consulted for right hip pain. Pt seen and examined at bedside. Daughter at bedside, translating for patient. Denies hip pain while laying still in bed, however upon movement of right leg pain increases to 9/10. Pain is localized to the right hip, non-radiating, exacerbated by movement. Denies lethargy, chest pain, SOB, nausea, vomiting, numbness/ tingling. Tolerating PO diet. PT with hx constipation. Reports last BM . Patient stated goal for pain control: to be able to take deep breaths, get out of bed to chair and ambulate with tolerable pain control. Pt denies taking medications for pain at home.     PAST MEDICAL & SURGICAL HISTORY:  Hyperlipidemia      Arthritis      Anemia      Neuropathy      Hypertension      History of Cholecystectomy      Gall Bladder Disease  Cholecystectomy  1994      Hip fracture  s/p surgery in 2015 -- pt&#x27;s son denies?          FAMILY HISTORY:  No pertinent family history in first degree relatives        Social History:  Patient lives with daughter, at baseline patient can walk on a flat surface without shortness of breath, can perform ADLS. Patient denies illicit drug use, ETOH or tobacco use. (13 Sep 2022 17:27)   [X ] Denies ETOH use, illicit drug use and smoking    Allergies    No Known Allergies    MEDICATIONS  (STANDING):  acetaminophen     Tablet .. 1000 milliGRAM(s) Oral every 8 hours  acetaminophen   IVPB .. 1000 milliGRAM(s) IV Intermittent once  ceFAZolin   IVPB 2000 milliGRAM(s) IV Intermittent every 8 hours  metoprolol tartrate 25 milliGRAM(s) Oral two times a day  polyethylene glycol 3350 17 Gram(s) Oral daily  senna 2 Tablet(s) Oral at bedtime  simvastatin 40 milliGRAM(s) Oral at bedtime  sodium chloride 0.9%. 1000 milliLiter(s) (100 mL/Hr) IV Continuous <Continuous>    MEDICATIONS  (PRN):  magnesium hydroxide Suspension 30 milliLiter(s) Oral daily PRN Constipation  metoclopramide Injectable 10 milliGRAM(s) IV Push once PRN Nausea and/or Vomiting  ondansetron Injectable 4 milliGRAM(s) IV Push every 6 hours PRN Nausea and/or Vomiting  oxyCODONE    IR 2.5 milliGRAM(s) Oral every 4 hours PRN Moderate Pain (4 - 6)  oxyCODONE    IR 5 milliGRAM(s) Oral every 4 hours PRN Severe Pain (7 - 10)      Vital Signs Last 24 Hrs  T(C): 36.8 (15 Sep 2022 15:43), Max: 37.6 (14 Sep 2022 18:02)  T(F): 98.2 (15 Sep 2022 15:43), Max: 99.6 (14 Sep 2022 18:02)  HR: 106 (15 Sep 2022 15:43) (89 - 106)  BP: 125/58 (15 Sep 2022 15:43) (120/58 - 172/65)  BP(mean): 71 (15 Sep 2022 15:43) (71 - 83)  RR: 18 (15 Sep 2022 15:43) (16 - 20)  SpO2: 100% (15 Sep 2022 15:43) (94% - 100%)    Parameters below as of 15 Sep 2022 15:43  Patient On (Oxygen Delivery Method): room air      COVID-19 PCR: Detected (13 Sep 2022 16:20)  COVID-19 PCR: Detected (2022 10:07)    LABS: Reviewed                          10.6   22.06 )-----------( 195      ( 15 Sep 2022 13:50 )             32.5     09-15    137  |  100  |  30<H>  ----------------------------<  166<H>  4.4   |  26  |  1.77<H>    Ca    8.6      15 Sep 2022 13:50  Phos  3.8     -14  Mg     2.7     -    TPro  7.4  /  Alb  3.3<L>  /  TBili  0.5  /  DBili  x   /  AST  19  /  ALT  16  /  AlkPhos  82  09-14    PT/INR - ( 14 Sep 2022 12:22 )   PT: 12.2 sec;   INR: 1.02 ratio         PTT - ( 14 Sep 2022 12:22 )  PTT:27.7 sec  LIVER FUNCTIONS - ( 14 Sep 2022 12:22 )  Alb: 3.3 g/dL / Pro: 7.4 g/dL / ALK PHOS: 82 U/L / ALT: 16 U/L DA / AST: 19 U/L / GGT: x           Urinalysis Basic - ( 13 Sep 2022 21:20 )    Color: Yellow / Appearance: Clear / S.020 / pH: x  Gluc: x / Ketone: Trace  / Bili: Negative / Urobili: Negative   Blood: x / Protein: 30 mg/dL / Nitrite: Negative   Leuk Esterase: Negative / RBC: 5-10 /HPF / WBC 0-2 /HPF   Sq Epi: x / Non Sq Epi: Few /HPF / Bacteria: Few /HPF    COVID-19 PCR: Detected (13 Sep 2022 16:20)  COVID-19 PCR: Detected (2022 10:07)      Radiology: Reviewed.   < from: CT Pelvis Bony Only No Cont (22 @ 20:33) >    ACC: [id number] EXAM:  CT PELVIS BONY ONLY                          PROCEDURE DATE:  2022          INTERPRETATION:  CT PELVIS BONY dated 2022 8:33 PM    INDICATION: Pain after fall.    COMPARISON: Hip radiographs of the same date    TECHNIQUE: CT imaging of the pelvis was performed. The data was   reformatted in the axial, coronal, and sagittal planes. Additionally, 3-D   reformatted imaging was created.    FINDINGS:    OSSEOUS STRUCTURES: Acute comminuted intratrochanteric fracture with   impaction of the right proximal femur. There is involvement of the   femoral neck and mild extension into the subtrochanteric region. Mild   avulsion of the lesser trochanter. Mild displacement of the fracture   fragments. There are old fracturesof the left superior and inferior   pubic rami without osseous union appreciated. Narrowing the hip joint   spaces. No dislocation. Old left sacral alar fracture. Sclerosis and   degenerative changes of both sacroiliac joints.  SYNOVIUM/ JOINT FLUID:Small right hip effusion.  TENDONS: Calcifications at the proximal hamstring tendons bilaterally.   Suspect partial tearing of the gluteus medius tendon on the right.  MUSCLES: Moderate enlargement of the right adductor muscles in keeping   with intramuscular hematoma.  NEUROVASCULAR STRUCTURES: Vascular calcifications are noted.  INTRAPELVIC SOFT TISSUES: Guardado catheter and gas within a nondistended   urinary bladder.  SUBCUTANEOUS SOFT TISSUES: Mild soft tissue swelling about the pelvis.    3-Dreformatted imaging confirms these findings.    IMPRESSION:    1.  Comminuted right intratrochanteric fracture as detailed above.  2.  Moderate right adductor muscle hematoma.  3.  Old fractures of left superior and inferior pubic rami without   osseous healing.  4.  Old left sacral alar fracture deformity.  5.  Degenerative changes.    --- End of Report ---            JANA NICHOLAS MD; Attending Radiologist  This document has been electronically signed. Sep 13 2022  8:56PM    < end of copied text >      ORT Score -   Family Hx of substance abuse	Female	      Male  Alcohol 	                                           1                     3  Illegal drugs	                                   2                     3  Rx drugs                                           4 	                  4  Personal Hx of substance abuse		  Alcohol 	                                          3	                  3  Illegal drugs                                     4	                  4  Rx drugs                                            5 	                  5  Age between 16- 45 years	           1                     1  hx preadolescent sexual abuse	   3 	                  0  Psychological disease		  ADD, OCD, bipolar, schizophrenia   2	          2  Depression                                           1 	          1  Total: 0    a score of 3 or lower indicates low risk for opioid abuse		  a score of 4-7 indicates moderate risk for opioid abuse		  a score of 8 or higher indicates high risk for opioid abuse  	  REVIEW OF SYSTEMS:  CONSTITUTIONAL: No fever + fatigue  RESPIRATORY: No cough, wheezing, chills or hemoptysis; No shortness of breath  CARDIOVASCULAR: No chest pain, palpitations, dizziness, or leg swelling  GASTROINTESTINAL: No loss of appetite, decreased PO intake. No abdominal or epigastric pain. No nausea, vomiting; No diarrhea + hx constipation.   GENITOURINARY: No dysuria, frequency, hematuria, retention or incontinence  MUSCULOSKELETAL: + occasional right hip joint pain no swelling; No back pain, no upper or lower motor strength weakness, no saddle anesthesia, bowel/bladder incontinence, + falls   NEURO: No headaches, No numbness/tingling b/l LE, No weakness    PHYSICAL EXAM:  GENERAL:  Alert & Oriented X4, cooperative, NAD, Good concentration. Speech is clear.   RESPIRATORY: Respirations even and unlabored. Clear to auscultation bilaterally; No rales, rhonchi, wheezing, or rubs + O2 2L NC in place.   CARDIOVASCULAR: Normal S1/S2, regular rate and rhythm; No murmurs, rubs, or gallops. No JVD.   GASTROINTESTINAL:  Soft, Nontender, Nondistended; Bowel sounds present  GENITOURINARY: Urinary catheter draining clear yellow urine   PERIPHERAL VASCULAR:  Extremities warm without edema. 2+ Peripheral Pulses, No cyanosis, No calf tenderness  MUSCULOSKELETAL: Motor Strength 4/5 B/L upper and 2/5  lower extremities; + decreased ROM of right hip; + right hip tenderness on palpation   SKIN: + right hip surgical dressing c/d/i; Warm, dry, intact. No rashes, lesions, scars or wounds.     Risk factors associated with adverse outcomes related to opioid treatment  [ ]  Concurrent benzodiazepine use  [ ]  History/ Active substance use or alcohol use disorder  [ ] Psychiatric co-morbidity  [ ] Sleep apnea  [ ] COPD  [ ] BMI> 35  [ ] Liver dysfunction  [ ] Renal dysfunction  [ ] CHF  [ ] Smoker  [ X]  Age > 60 years    [X ]  [facility]  Reviewed and Copied to Chart. See below.    Plan of care and goal oriented pain management treatment options were discussed with patient and /or primary care giver; all questions and concerns were addressed and care was aligned with patient's wishes.    Educated patient on goal oriented pain management treatment options     09-15-22 @ 16:17

## 2022-09-15 NOTE — PROGRESS NOTE ADULT - PROBLEM SELECTOR PLAN 2
Patient planned for IM Pinning of Right Hip Fracture today   Due to urgent surgery, patient medically optimized for surgery, patient is an intermediate-risk for an intermediate risk procedure  Cards risk strat noted:  "Patient's Revised Cardiac Risk Index (RCRI) score is 0 ( 3.9 % risk) and Reis score is 0.3 % risk. Patient is at intermediate  risk of  adverse perioperative cardiac events undergoing  intermediate risk surgery. No further cardiac testing is needed prior to patient's surgery. "  COVID positive, no need for isolation, pt tested positive in July 2022,   as per infection control no need to isolate if less than 90 days since the last COVID infection

## 2022-09-15 NOTE — PROGRESS NOTE ADULT - PROBLEM SELECTOR PLAN 1
Pt with acute right hip pain which is somatic and neuropathic in nature due to right hip fracture, s/p Insertion of locking intramedullary lupe into right hip on 9/14 POD #0. GOHCO.   High risk medications reviewed. Avoid polypharmacy. Avoid IV opioids once postop. Avoid NSAIDs and benzodiazepines. Non-pharmacological sleep aides initiated. Non-opioid medications and non-pharmacological pain management measures initiated.   Opioid pain recommendations   - Continue Oxycodone 2.5/5 mg PO q 4 hours PRN moderate/severe pain. Monitor for sedation/ respiratory depression.   Non-opioid pain recommendations   - Acetaminophen 1 gram PO q 8 hours x 4 days.   Bowel Regimen  - Miralax 17G PO daily  - Senna 2 tablets at bedtime for constipation                                                                                                          Mild pain   - Non-pharmacological pain treatment recommendations  - Warm/ Cool packs PRN   - Repositioning extremity, guided imagery, relaxation, distraction.  - Physical therapy OOB if no contraindications once postop  Recommendations discussed with primary team and RN.

## 2022-09-15 NOTE — CONSULT NOTE ADULT - TIME BILLING
- Review of records, telemetry, vital signs and daily labs.   - General and cardiovascular physical examination.  - Generation of cardiovascular treatment plan.  - Coordination of care.      Patient was seen and examined by me on 09/15/2022,interim events noted,labs and radiology studies reviewed.  Aniceto Dorsey MD,FACC.  [address] [place], [place] [id number].  [phone]

## 2022-09-15 NOTE — PROGRESS NOTE ADULT - SUBJECTIVE AND OBJECTIVE BOX
89yFemale    Diagnosis:  S/p Right Hip IM Nailing POD#0    Patient was seen and evaluated at bedside. Patient with no acute complaints.   Pain is  well controlled.    Denies CP/SOB, dyspnea, paresthesias, N/V/D, palpitations.     Vital Signs Last 24 Hrs  T(C): 36.8 (15 Sep 2022 15:43), Max: 37.4 (15 Sep 2022 00:22)  T(F): 98.2 (15 Sep 2022 15:43), Max: 99.3 (15 Sep 2022 00:22)  HR: 101 (15 Sep 2022 18:13) (89 - 106)  BP: 127/58 (15 Sep 2022 18:13) (120/58 - 172/65)  BP(mean): 71 (15 Sep 2022 15:43) (71 - 83)  RR: 18 (15 Sep 2022 15:43) (16 - 20)  SpO2: 100% (15 Sep 2022 15:43) (96% - 100%)    Parameters below as of 15 Sep 2022 15:43  Patient On (Oxygen Delivery Method): room air      I&O's Detail    14 Sep 2022 07:01  -  15 Sep 2022 07:00  --------------------------------------------------------  IN:  Total IN: 0 mL    OUT:    Indwelling Catheter - Urethral (mL): 2100 mL  Total OUT: 2100 mL    Total NET: -2100 mL          Physical Exam:    General:NAD, resting comfortably in bed.    Right Hip:  Dressing is C/D/I. Skin is pink and warm.  No erythema. SILT.  Wound with no drainage, healing well.   Lower extremity:  No calf tenderness, calves are soft. 2+pulses. NVI. 5/5 Strength of EHL/TA/gastrocnemius B/L.  Good capillary refill. Warm, well-perfused.                           10.6   22.06 )-----------( 195      ( 15 Sep 2022 13:50 )             32.5     09-15    137  |  100  |  30<H>  ----------------------------<  166<H>  4.4   |  26  |  1.77<H>    Ca    8.6      15 Sep 2022 13:50  Phos  3.8     09-14  Mg     2.7     09-14    TPro  7.4  /  Alb  3.3<L>  /  TBili  0.5  /  DBili  x   /  AST  19  /  ALT  16  /  AlkPhos  82  09-14      Impression:  89yFemale S/p Right Hip IM Nailing POD#0  Plan:  -  Pain management  -  Dvt prophylaxis with venodynes and lovenox   -  Daily Physical Therapy:  WBAT of the right lower extremity  -  Discharge planning: Rehab - family requesting [facility]  -  Continue with Post-op Antibiotics x 24hrs  -  Case d/w Dr. Encinas

## 2022-09-15 NOTE — PROGRESS NOTE ADULT - PROBLEM SELECTOR PLAN 3
hx of anemia requiring iron transfusions   hgb on admission 8.2  has right adductor hematoma  - will transfuse 2 units PRBCs as per ortho with goal hgb 10  - Today Hgb is 11.8, will follow post-op.

## 2022-09-15 NOTE — CONSULT NOTE ADULT - SUBJECTIVE AND OBJECTIVE BOX
DATE OF SERVICE:09-15-22 @ 07:03    Patient was seen,examined and evaluated  by me.ER evaluation, Labs and Hospital course was reviewed,    CHIEF COMPLAINT:Fall    HPI:HPI:  Patient is a 89 yo Kazakh speaking female (accompanied by her son Isac, who provides translation) with PMH HTN, anemia requiring Iron transfusions (last one before COVID), HLD BIBEMS to the ED several hours after a witnessed fall. According to patient, she tripped and fell backwards in her kitchen and home health aide caught the patient's back before it hit the ground patient landed on her R buttock but without striking her head. Patient denies any dizziness, weakness, visual or neurological symptoms before falling. Patient reports 8/10 sharp, throbbing pain on the right hip. Pain is non radiating and worsened with movement but made better with the morphine. Patient denies any syncope. Pt denies any CP, SOB, abdominal pain, nausea vomiting or changes in bowel habits. Patient denies fevers or chills. At baseline, patient can perform ADLs, walks with no devices, and can walk on a flat surface and incline with no symptoms.     Patient recieved PRBC transfusion scheduled for Right Hip Hemiarthroplasty  Denies chest pain dyspnea no recent cardiac events reported        PAST MEDICAL & SURGICAL HISTORY:  Hyperlipidemia      Arthritis      Anemia      Neuropathy      Hypertension      History of Cholecystectomy      Gall Bladder Disease  Cholecystectomy  1994      Hip fracture  s/p surgery in june/2015 -- pt&#x27;s son denies?          MEDICATIONS  (STANDING):  metoprolol tartrate 25 milliGRAM(s) Oral two times a day  polyethylene glycol 3350 17 Gram(s) Oral daily  povidone iodine 5% Nasal Swab 1 Application(s) Both Nostrils once  senna 2 Tablet(s) Oral at bedtime  simvastatin 40 milliGRAM(s) Oral at bedtime    MEDICATIONS  (PRN):  HYDROmorphone  Injectable 0.5 milliGRAM(s) IV Push every 6 hours PRN Severe Pain (7 - 10)  oxyCODONE    IR 5 milliGRAM(s) Oral every 6 hours PRN Moderate Pain (4 - 6)      FAMILY HISTORY:  No pertinent family history in first degree relatives      No family history of premature coronary artery disease or sudden cardiac death    SOCIAL HISTORY:  Smoking-[ ] Active  [ ] Former [x ] Non Smoker  Alcohol-[x ] Denies [ ] Social [ ] Daily  Ilicit Drug use-[ x] Denies [ ] Active user    REVIEW OF SYSTEMS:  Constitutional: [ ] fever, [ ]weight loss, [x ]fatigue   Activity [ ] Bedbound,[x ] Ambulates [ ] Unassisted[x ] Cane/Walker [ ] Assistence.  Effort tolerance:[ ] Excellent [ ] Good [ ] Fair [ x] Poor [ ]  Eyes: [ ] visual changes  Respiratory: [ ]shortness of breath;  [ ] cough, [ ]wheezing, [ ]chills, [ ]hemoptysis  Cardiovascular: [ ] chest pain, [ ]palpitations, [ ]dizziness,  [ ]leg swelling[ ]orthopnea [ ]PND  Gastrointestinal: [ ] abdominal pain, [ ]nausea, [ ]vomiting,  [ ]diarrhea,[ ]constipation  Genitourinary: [ ] dysuria, [ ] hematuria  Neurologic: [ ] headaches [ ] tremors[ ] weakness  Skin: [ ] itching, [ ]burning, [ ] rashes  Endocrine: [ ] heat or cold intolerance  Musculoskeletal: [x ] joint pain or swelling; [ ] muscle, back, or extremity pain  Psychiatric: [ ] depression, [ ]anxiety, [ ]mood swings, or [ ]difficulty sleeping  Hematologic: [ ] easy bruising, [ ] bleeding gums       [ x] All others negative	  [ ] Unable to obtain    Vital Signs Last 24 Hrs  T(C): 36.8 (15 Sep 2022 05:00), Max: 37.6 (14 Sep 2022 18:02)  T(F): 98.2 (15 Sep 2022 05:00), Max: 99.6 (14 Sep 2022 18:02)  HR: 104 (15 Sep 2022 05:00) (92 - 105)  BP: 159/58 (15 Sep 2022 05:00) (154/69 - 178/69)  BP(mean): 95 (14 Sep 2022 15:29) (95 - 95)  RR: 18 (15 Sep 2022 05:00) (16 - 18)  SpO2: 96% (15 Sep 2022 05:00) (94% - 97%)    Parameters below as of 15 Sep 2022 05:00  Patient On (Oxygen Delivery Method): room air      I&O's Summary    14 Sep 2022 07:01  -  15 Sep 2022 07:00  --------------------------------------------------------  IN: 0 mL / OUT: 2100 mL / NET: -2100 mL        PHYSICAL EXAM:  General: No acute distress BMI-29  HEENT: EOMI, PERRL[ ] Icteric  Neck: Supple, No JVD  Lungs: Equal air entry bilaterally; [ ] Rales [ ] Rhonchi [ ] Wheezing  Heart: Regular rate and rhythm;[x ] Murmurs-  2 /6 [ x] Systolic [ ] Diastolic [ ] Radiation,No rubs, or gallops  Abdomen: Nontender, bowel sounds present  Extremities: No clubbing, cyanosis, or edema[ ] Calf tenderness[x]R Hip: Leg is flexed,shortened and externally rotated, Skin is pink and warm. Tender at the fracture site. Decreased ROM of the affected hip due to pain. SILT.  Positive logroll test.  Nervous system:  Alert & Oriented X3, no focal deficits  Psychiatric: Normal affect  Skin: No rashes or lesions      LABS:  09-15    136  |  100  |  24<H>  ----------------------------<  151<H>  4.4   |  27  |  1.31<H>    Ca    9.3      15 Sep 2022 05:30  Phos  3.8     09-14  Mg     2.7     09-14    TPro  7.4  /  Alb  3.3<L>  /  TBili  0.5  /  DBili  x   /  AST  19  /  ALT  16  /  AlkPhos  82  09-14    Creatinine Trend: 1.31<--, 1.25<--, 1.31<--                        11.8 <--8.8  17.05 )-----------( 188      ( 15 Sep 2022 05:30 )             35.9     PT/INR - ( 14 Sep 2022 12:22 )   PT: 12.2 sec;   INR: 1.02 ratio         PTT - ( 14 Sep 2022 12:22 )  PTT:27.7 sec    Lipid Panel:   Cardiac Enzymes: CARDIAC MARKERS ( 13 Sep 2022 13:12 )  x     / x     / 68 U/L / x     / x          Serum Pro-Brain Natriuretic Peptide: 737 pg/mL (09-13-22 @ 13:12)    Found to be positivefor COVID:  asymptomatic-- prior COVID positive test in July. no need for isolation, pt tested positive in July 2022,  as per infection control no need to isolate if less than 90 days since the last COVID infection.      RADIOLOGY:    XR CHEST AP OR PA 1V      IMPRESSION:  No active pulmonary disease.    CT pelvis confirmed:  Comminuted right intratrochanteric fracture as detailed above. Moderate right adductor muscle hematoma.  Old fractures of left superior and inferior pubic rami without   osseous healing.      ECG [my interpretation]:  Sinus rhythm First degree AVB No acute ST T wave abnormalities    ECHO:  2015-Normal LV Systolic Function EF>55% No sig Valvular abnormalities

## 2022-09-15 NOTE — PROGRESS NOTE ADULT - NS ATTEND AMEND GEN_ALL_CORE FT
Patient was seen and examined at 6N after OR. Divehi  used [id number]. AAOx2-3, complains of pain at the surgical site, 7/10. Denies any other complains     ICU Vital Signs Last 24 Hrs  T(C): 36.4 (15 Sep 2022 20:07), Max: 37.4 (15 Sep 2022 00:22)  T(F): 97.6 (15 Sep 2022 20:07), Max: 99.3 (15 Sep 2022 00:22)  HR: 113 (15 Sep 2022 20:07) (89 - 113)  BP: 115/46 (15 Sep 2022 20:07) (115/46 - 172/65)  BP(mean): 63 (15 Sep 2022 20:07) (63 - 83)  ABP: --  ABP(mean): --  RR: 18 (15 Sep 2022 20:07) (16 - 20)  SpO2: 100% (15 Sep 2022 20:07) (96% - 100%)    O2 Parameters below as of 15 Sep 2022 20:07  Patient On (Oxygen Delivery Method): room air        P/E:  NAD  AAOx2-3, no focal deficit, limited exam of LE due to pain   CTABL  S1S2 WNL, no MRG  Abd soft, non tender, BS present   Guardado draining clear urine   Right hip bandage clean, dry and intact     Labs noted     A/P:  Right hip fracture s/p IM nailing 9/15  Rt adductor muscle hematoma   Chronic Iron deficiency anemia   HTN   Stage 3 CKD  HLD    Plan:   Patient tolerated procedure well  Pain management following, cont with bowel regimen  Monitor leukocytosis, no active s/s of infection, send sepsis w/u if persistent leukocytosis   TOV  PT   DVT ppx as per ortho  Monitor Hb  Incentive spirometry  Cont BB with holding parameter   Cont IVF at current rate  Monitor renal function   Rest of the management as above.

## 2022-09-15 NOTE — PROGRESS NOTE ADULT - ASSESSMENT
Confidential Drug Utilization Report  Search Terms: everardo fernandez, 07/16/1930Search Date: 09/14/2022 08:51:51 AM  The Drug Utilization Report below displays all of the controlled substance prescriptions, if any, that your patient has filled in the last twelve months. The information displayed on this report is compiled from pharmacy submissions to the Department, and accurately reflects the information as submitted by the pharmacies.    This report was requested by: Mery Paulson | Reference #: [id number]    You have not added a ROMINA number. Keeping your ROMINA number(s) up to date on the My ROMINA # page will enable the separation of your prescriptions from others in the search results.    Others' Prescriptions  Patient Name: Everardo FernandezBirth Date: 07/16/1930  Address: -[address] [place] [id number]: Female  Rx Written	Rx Dispensed	Drug	Quantity	Days Supply	Prescriber Name	Prescriber Romina #	Payment Method  05/26/2022	09/01/2022	acetaminophen-cod #3 tablet	90	30	SANDRA Castañeda DO	[id number]	Insurance  [facility]  05/26/2022	05/26/2022	acetaminophen-cod #3 tablet	90	30	PisciottSANDRA pryor DO	[id number]	Insurance  [facility]  11/11/2021	02/19/2022	acetaminophen-cod #3 tablet	90	30	Pisciotto, Tyshawn A DO	[id number]	Insurance  [facility]  11/11/2021	11/12/2021	acetaminophen-cod #3 tablet	90	30	Pisciotto, Tyshawn A DO	[id number]	Insurance  [facility]  * - Drugs marked with an asterisk are compound drugs. If the compound drug is made up of more than one controlled substance, then each controlled substance will be a separate row in the table.

## 2022-09-15 NOTE — PROGRESS NOTE ADULT - SUBJECTIVE AND OBJECTIVE BOX
Chief Complaint:  HPI:  Patient is a 89 yo Arabic speaking female (accompanied by her son Isac, who provides translation) with PMH HTN, anemia requiring Iron transfusions (last one before COVID), HLD BIBEMS to the ED several hours after a witnessed fall.   R hip fracture, for OR today.    No acute events overnight. Daughter at bedside. Pt with R hip pain, otherwise denies complaints.      Allergies    No Known Allergies    Intolerances      Vital Signs Last 24 Hrs  T(C): 36.8 (15 Sep 2022 05:00), Max: 37.6 (14 Sep 2022 18:02)  T(F): 98.2 (15 Sep 2022 05:00), Max: 99.6 (14 Sep 2022 18:02)  HR: 104 (15 Sep 2022 05:00) (92 - 105)  BP: 159/58 (15 Sep 2022 05:00) (154/69 - 178/69)  BP(mean): 95 (14 Sep 2022 15:29) (95 - 95)  RR: 18 (15 Sep 2022 05:00) (16 - 18)  SpO2: 96% (15 Sep 2022 05:00) (94% - 97%)    Parameters below as of 15 Sep 2022 05:00  Patient On (Oxygen Delivery Method): room air        MedsMEDICATIONS  (STANDING):  metoprolol tartrate 25 milliGRAM(s) Oral two times a day  polyethylene glycol 3350 17 Gram(s) Oral daily  povidone iodine 5% Nasal Swab 1 Application(s) Both Nostrils once  senna 2 Tablet(s) Oral at bedtime  simvastatin 40 milliGRAM(s) Oral at bedtime    MEDICATIONS  (PRN):  HYDROmorphone  Injectable 0.5 milliGRAM(s) IV Push every 6 hours PRN Severe Pain (7 - 10)  oxyCODONE    IR 5 milliGRAM(s) Oral every 6 hours PRN Moderate Pain (4 - 6)      PHYSICAL EXAM:  GENERAL: NAD, well-groomed, well-developed  NEURO: AAOx3, ROSEANN b/l, 5/5 b/l UE and 5/5 b/l LE motor strength  LUNG: Lungs clear to auscultation bilaterally, no wheezing, rhonchi, or rales.  HEART: S1, S2, no S3 or S4. Regular rate and rhythm. No murmurs, gallops, or rubs. No JVD  ABDOMEN: Bowel sounds present, abd Soft, Nontender, Nondistended  EXTREMITIES:  2+ Peripheral Pulses b/l, No clubbing, cyanosis, or edema  SKIN: No rashes or lesions    Consultant(s) Notes Reviewed:  [x ] YES  [ ] NO  Care Discussed with Consultants/Other Providers [ x] YES  [ ] NO    LABS:                        11.8   17.05 )-----------( 188      ( 15 Sep 2022 05:30 )             35.9     09-15    136  |  100  |  24<H>  ----------------------------<  151<H>  4.4   |  27  |  1.31<H>    Ca    9.3      15 Sep 2022 05:30  Phos  3.8     09-14  Mg     2.7     09-14    TPro  7.4  /  Alb  3.3<L>  /  TBili  0.5  /  DBili  x   /  AST  19  /  ALT  16  /  AlkPhos  82  09-14    PT/INR - ( 14 Sep 2022 12:22 )   PT: 12.2 sec;   INR: 1.02 ratio         PTT - ( 14 Sep 2022 12:22 )  PTT:27.7 sec    RADIOLOGY & ADDITIONAL TESTS:    EKG:

## 2022-09-15 NOTE — BRIEF OPERATIVE NOTE - NSICDXBRIEFPROCEDURE_GEN_ALL_CORE_FT
PROCEDURES:  Insertion of locking intramedullary lupe into right hip 15-Sep-2022 12:13:00  Johan Durbin

## 2022-09-15 NOTE — PROGRESS NOTE ADULT - SUBJECTIVE AND OBJECTIVE BOX
[facility]  [id number]      Diagnosis:  Right Hip Intertrochanteric Fracture     89yFemaleHPI:  Patient was seen and evaluated at bedside. Patient with complaints of right hip pain.  Echocardiogram was performed this morning, pending read  Denies CP/SOB, dyspnea, paresthesias, N/V/D, palpitations.     Vital Signs Last 24 Hrs  T(C): 36.8 (15 Sep 2022 05:00), Max: 37.6 (14 Sep 2022 18:02)  T(F): 98.2 (15 Sep 2022 05:00), Max: 99.6 (14 Sep 2022 18:02)  HR: 104 (15 Sep 2022 05:00) (92 - 105)  BP: 159/58 (15 Sep 2022 05:00) (154/69 - 178/69)  BP(mean): 95 (14 Sep 2022 15:29) (95 - 95)  RR: 18 (15 Sep 2022 05:00) (16 - 18)  SpO2: 96% (15 Sep 2022 05:00) (94% - 97%)    Parameters below as of 15 Sep 2022 05:00  Patient On (Oxygen Delivery Method): room air      I&O's Detail    14 Sep 2022 07:01  -  15 Sep 2022 07:00  --------------------------------------------------------  IN:  Total IN: 0 mL    OUT:    Indwelling Catheter - Urethral (mL): 2100 mL  Total OUT: 2100 mL    Total NET: -2100 mL          Physical Exam:  General: AAOx3, NAD, resting comfortably in bed.  Right Hip:   Skin is pink and warm. Tender at the fracture site. Decreased ROM of the affected hip due to pain. SILT.  Positive logroll test.  Lower extremity:  No calf tenderness, calves are soft. 2+pulses. NVI. 5/5 Strength of EHL/TA/gastrocnemius B/L.  Good capillary refill. Warm, well-perfused.                           11.8   17.05 )-----------( 188      ( 15 Sep 2022 05:30 )             35.9     09-15    136  |  100  |  24<H>  ----------------------------<  151<H>  4.4   |  27  |  1.31<H>    Ca    9.3      15 Sep 2022 05:30  Phos  3.8       Mg     2.7         TPro  7.4  /  Alb  3.3<L>  /  TBili  0.5  /  DBili  x   /  AST  19  /  ALT  16  /  AlkPhos  82      PT/INR - ( 14 Sep 2022 12:22 )   PT: 12.2 sec;   INR: 1.02 ratio         PTT - ( 14 Sep 2022 12:22 )  PTT:27.7 sec  Urinalysis Basic - ( 13 Sep 2022 21:20 )    Color: Yellow / Appearance: Clear / S.020 / pH: x  Gluc: x / Ketone: Trace  / Bili: Negative / Urobili: Negative   Blood: x / Protein: 30 mg/dL / Nitrite: Negative   Leuk Esterase: Negative / RBC: 5-10 /HPF / WBC 0-2 /HPF   Sq Epi: x / Non Sq Epi: Few /HPF / Bacteria: Few /HPF          Impression:  89yFemale with Right Hip Intertrochanteric Fracture    Plan:  -  Pain management  -  Dvt prophylaxis with Venodynes  -  Keep NPO today for surgery  -  Surgeon:  Dr. Encinas  -  Procedure:  Right hip intramedullary nailing  -  For Surgery today  -  Medical Optimization  -  Cleared by cardiology at intermediate risk     > Echo performed this am   -  Consent: in the chart  - Fracture care, bedrest. NWB of the affected extremity.  -  Case d/w DR. Encinas

## 2022-09-15 NOTE — PROGRESS NOTE ADULT - PROBLEM SELECTOR PLAN 7
WBC 17 today, afebrile, may be reactive to R hip trauma?  Notified Ortho team who will proceed with OR today.

## 2022-09-16 DIAGNOSIS — N18.9 CHRONIC KIDNEY DISEASE, UNSPECIFIED: ICD-10-CM

## 2022-09-16 DIAGNOSIS — Z02.9 ENCOUNTER FOR ADMINISTRATIVE EXAMINATIONS, UNSPECIFIED: ICD-10-CM

## 2022-09-16 LAB
ANION GAP SERPL CALC-SCNC: 10 MMOL/L — SIGNIFICANT CHANGE UP (ref 5–17)
ANION GAP SERPL CALC-SCNC: 12 MMOL/L — SIGNIFICANT CHANGE UP (ref 5–17)
BUN SERPL-MCNC: 40 MG/DL — HIGH (ref 7–18)
BUN SERPL-MCNC: 42 MG/DL — HIGH (ref 7–18)
CALCIUM SERPL-MCNC: 7.4 MG/DL — LOW (ref 8.4–10.5)
CALCIUM SERPL-MCNC: 7.6 MG/DL — LOW (ref 8.4–10.5)
CHLORIDE SERPL-SCNC: 100 MMOL/L — SIGNIFICANT CHANGE UP (ref 96–108)
CHLORIDE SERPL-SCNC: 103 MMOL/L — SIGNIFICANT CHANGE UP (ref 96–108)
CO2 SERPL-SCNC: 23 MMOL/L — SIGNIFICANT CHANGE UP (ref 22–31)
CO2 SERPL-SCNC: 24 MMOL/L — SIGNIFICANT CHANGE UP (ref 22–31)
CREAT SERPL-MCNC: 2.37 MG/DL — HIGH (ref 0.5–1.3)
CREAT SERPL-MCNC: 2.59 MG/DL — HIGH (ref 0.5–1.3)
EGFR: 17 ML/MIN/1.73M2 — LOW
EGFR: 19 ML/MIN/1.73M2 — LOW
GLUCOSE SERPL-MCNC: 144 MG/DL — HIGH (ref 70–99)
GLUCOSE SERPL-MCNC: 161 MG/DL — HIGH (ref 70–99)
HCT VFR BLD CALC: 24.2 % — LOW (ref 34.5–45)
HCT VFR BLD CALC: 26.6 % — LOW (ref 34.5–45)
HGB BLD-MCNC: 7.8 G/DL — LOW (ref 11.5–15.5)
HGB BLD-MCNC: 8.5 G/DL — LOW (ref 11.5–15.5)
MCHC RBC-ENTMCNC: 31.2 PG — SIGNIFICANT CHANGE UP (ref 27–34)
MCHC RBC-ENTMCNC: 31.3 PG — SIGNIFICANT CHANGE UP (ref 27–34)
MCHC RBC-ENTMCNC: 32 GM/DL — SIGNIFICANT CHANGE UP (ref 32–36)
MCHC RBC-ENTMCNC: 32.2 GM/DL — SIGNIFICANT CHANGE UP (ref 32–36)
MCV RBC AUTO: 96.8 FL — SIGNIFICANT CHANGE UP (ref 80–100)
MCV RBC AUTO: 97.8 FL — SIGNIFICANT CHANGE UP (ref 80–100)
NRBC # BLD: 0 /100 WBCS — SIGNIFICANT CHANGE UP (ref 0–0)
NRBC # BLD: 0 /100 WBCS — SIGNIFICANT CHANGE UP (ref 0–0)
PLATELET # BLD AUTO: 132 K/UL — LOW (ref 150–400)
PLATELET # BLD AUTO: 146 K/UL — LOW (ref 150–400)
POTASSIUM SERPL-MCNC: 4.3 MMOL/L — SIGNIFICANT CHANGE UP (ref 3.5–5.3)
POTASSIUM SERPL-MCNC: 4.3 MMOL/L — SIGNIFICANT CHANGE UP (ref 3.5–5.3)
POTASSIUM SERPL-SCNC: 4.3 MMOL/L — SIGNIFICANT CHANGE UP (ref 3.5–5.3)
POTASSIUM SERPL-SCNC: 4.3 MMOL/L — SIGNIFICANT CHANGE UP (ref 3.5–5.3)
RBC # BLD: 2.5 M/UL — LOW (ref 3.8–5.2)
RBC # BLD: 2.72 M/UL — LOW (ref 3.8–5.2)
RBC # FLD: 15.7 % — HIGH (ref 10.3–14.5)
RBC # FLD: 15.7 % — HIGH (ref 10.3–14.5)
SODIUM SERPL-SCNC: 135 MMOL/L — SIGNIFICANT CHANGE UP (ref 135–145)
SODIUM SERPL-SCNC: 137 MMOL/L — SIGNIFICANT CHANGE UP (ref 135–145)
WBC # BLD: 11.71 K/UL — HIGH (ref 3.8–10.5)
WBC # BLD: 15.59 K/UL — HIGH (ref 3.8–10.5)
WBC # FLD AUTO: 11.71 K/UL — HIGH (ref 3.8–10.5)
WBC # FLD AUTO: 15.59 K/UL — HIGH (ref 3.8–10.5)

## 2022-09-16 PROCEDURE — 76775 US EXAM ABDO BACK WALL LIM: CPT | Mod: 26

## 2022-09-16 PROCEDURE — 99233 SBSQ HOSP IP/OBS HIGH 50: CPT

## 2022-09-16 RX ORDER — SODIUM CHLORIDE 9 MG/ML
500 INJECTION INTRAMUSCULAR; INTRAVENOUS; SUBCUTANEOUS ONCE
Refills: 0 | Status: COMPLETED | OUTPATIENT
Start: 2022-09-16 | End: 2022-09-16

## 2022-09-16 RX ORDER — TAMSULOSIN HYDROCHLORIDE 0.4 MG/1
0.4 CAPSULE ORAL AT BEDTIME
Refills: 0 | Status: DISCONTINUED | OUTPATIENT
Start: 2022-09-16 | End: 2022-09-17

## 2022-09-16 RX ORDER — TAMSULOSIN HYDROCHLORIDE 0.4 MG/1
0.4 CAPSULE ORAL ONCE
Refills: 0 | Status: COMPLETED | OUTPATIENT
Start: 2022-09-16 | End: 2022-09-16

## 2022-09-16 RX ORDER — SODIUM CHLORIDE 9 MG/ML
1000 INJECTION INTRAMUSCULAR; INTRAVENOUS; SUBCUTANEOUS
Refills: 0 | Status: ACTIVE | OUTPATIENT
Start: 2022-09-16 | End: 2022-09-17

## 2022-09-16 RX ORDER — POLYETHYLENE GLYCOL 3350 17 G/17G
17 POWDER, FOR SOLUTION ORAL
Refills: 0 | Status: DISCONTINUED | OUTPATIENT
Start: 2022-09-16 | End: 2022-09-20

## 2022-09-16 RX ADMIN — Medication 1000 MILLIGRAM(S): at 06:08

## 2022-09-16 RX ADMIN — OXYCODONE HYDROCHLORIDE 5 MILLIGRAM(S): 5 TABLET ORAL at 22:46

## 2022-09-16 RX ADMIN — Medication 100 MILLIGRAM(S): at 03:20

## 2022-09-16 RX ADMIN — Medication 1000 MILLIGRAM(S): at 22:05

## 2022-09-16 RX ADMIN — Medication 1000 MILLIGRAM(S): at 14:36

## 2022-09-16 RX ADMIN — SIMVASTATIN 40 MILLIGRAM(S): 20 TABLET, FILM COATED ORAL at 21:41

## 2022-09-16 RX ADMIN — OXYCODONE HYDROCHLORIDE 5 MILLIGRAM(S): 5 TABLET ORAL at 23:02

## 2022-09-16 RX ADMIN — TAMSULOSIN HYDROCHLORIDE 0.4 MILLIGRAM(S): 0.4 CAPSULE ORAL at 21:42

## 2022-09-16 RX ADMIN — ENOXAPARIN SODIUM 30 MILLIGRAM(S): 100 INJECTION SUBCUTANEOUS at 01:20

## 2022-09-16 RX ADMIN — SODIUM CHLORIDE 1000 MILLILITER(S): 9 INJECTION INTRAMUSCULAR; INTRAVENOUS; SUBCUTANEOUS at 04:01

## 2022-09-16 RX ADMIN — Medication 1000 MILLIGRAM(S): at 15:24

## 2022-09-16 RX ADMIN — TAMSULOSIN HYDROCHLORIDE 0.4 MILLIGRAM(S): 0.4 CAPSULE ORAL at 01:26

## 2022-09-16 RX ADMIN — Medication 1000 MILLIGRAM(S): at 05:48

## 2022-09-16 RX ADMIN — POLYETHYLENE GLYCOL 3350 17 GRAM(S): 17 POWDER, FOR SOLUTION ORAL at 17:17

## 2022-09-16 RX ADMIN — SENNA PLUS 2 TABLET(S): 8.6 TABLET ORAL at 21:41

## 2022-09-16 RX ADMIN — POLYETHYLENE GLYCOL 3350 17 GRAM(S): 17 POWDER, FOR SOLUTION ORAL at 11:52

## 2022-09-16 RX ADMIN — Medication 1000 MILLIGRAM(S): at 21:42

## 2022-09-16 RX ADMIN — SODIUM CHLORIDE 1000 MILLILITER(S): 9 INJECTION INTRAMUSCULAR; INTRAVENOUS; SUBCUTANEOUS at 00:41

## 2022-09-16 NOTE — PROGRESS NOTE ADULT - PROBLEM SELECTOR PLAN 1
CT pelvis confirmed: right intratrochanteric fracture as detailed above.   Moderate right adductor muscle hematoma.  - ortho on board   -s/p IM nailing of right hip 9/15, POD #1   - pain management: Hydromorphone 0.5 mg  IV Push every 6 hours PRN Severe Pain   oxycodone 5 mg Oral every 6 hours PRN Moderate Pain

## 2022-09-16 NOTE — PROGRESS NOTE ADULT - PROBLEM SELECTOR PLAN 6
creatinine at baseline, today 1.31  -avoid nephrotoxins WBC 17, afebrile, may be reactive to R hip trauma   -downtrending wbc, 11.71 today  monitor wbc

## 2022-09-16 NOTE — PROGRESS NOTE ADULT - PROBLEM SELECTOR PLAN 8
- SCDs for DVT prophylaxis -pending h/h and creatinine improvement with urine output, nephrology consulted.   D/c to [facility], family requesting [place]

## 2022-09-16 NOTE — CONSULT NOTE ADULT - ASSESSMENT
Confidential Drug Utilization Report  Search Terms: everardo fernandez, 07/16/1930Search Date: 09/14/2022 08:51:51 AM  The Drug Utilization Report below displays all of the controlled substance prescriptions, if any, that your patient has filled in the last twelve months. The information displayed on this report is compiled from pharmacy submissions to the Department, and accurately reflects the information as submitted by the pharmacies.    This report was requested by: Mery Paulson | Reference #: [id number]    You have not added a ROMINA number. Keeping your ROMINA number(s) up to date on the My ROMINA # page will enable the separation of your prescriptions from others in the search results.    Others' Prescriptions  Patient Name: Everardo FernandezBirth Date: 07/16/1930  Address: -[address] [place] [id number]: Female  Rx Written	Rx Dispensed	Drug	Quantity	Days Supply	Prescriber Name	Prescriber Romina #	Payment Method  05/26/2022	09/01/2022	acetaminophen-cod #3 tablet	90	30	SANDRA Castañeda DO	[id number]	Insurance  [facility]  05/26/2022	05/26/2022	acetaminophen-cod #3 tablet	90	30	PisciottSANDRA pryor DO	[id number]	Insurance  [facility]  11/11/2021	02/19/2022	acetaminophen-cod #3 tablet	90	30	Pisciotto, Tyshawn A DO	[id number]	Insurance  [facility]  11/11/2021	11/12/2021	acetaminophen-cod #3 tablet	90	30	Pisciotto, Tyshawn A DO	[id number]	Insurance  [facility]  * - Drugs marked with an asterisk are compound drugs. If the compound drug is made up of more than one controlled substance, then each controlled substance will be a separate row in the table.
Patient is a 89 yo Zimbabwean speaking female with h/o HTN, iron def anemia, HLD  presents with rt hip pain s/p fall. Found to have right intratrochanteric fracture s/p OR 9/15 with insertion of locking intramedullary lupe into right hip. Hospital course complicated by RAMSEY. Nephrology consulted for Elevated serum creatinine.    1. RAMSEY- unknown baseline SCr; sadie SCr 1.2-1.3. RAMSEY likely hemodynamically mediated. UA with 30 protein and mod blood; defer secondary w/u due to advanced age. Check urine lytes. c/w NS @100 cc/hr for 24-48hrs. Bladder scan 9/15 neg for retention. Please d/c flomax. Check C3 and C4 to r/o atheroemboli. Recheck CK level in am. Check Renal US. Strict I/Os. Avoid nephrotoxins/ NSAIDs/ RCA. Monitor BMP.  2. Essential HTN- BP low normal. Pt on Metoprolol 25mg PO bid. Avoid hypotension. Rate control as per primary team.   3. Rt hip fracture-  s/p OR 9/15 with insertion of locking intramedullary lupe into right hip. Ortho following. Pain control. Please avoid NSAIDS  4. Anemia- post op anemia. Pt for 1 unit prbc today. Check iron studies including ferritin in am.  Monitor h/h. Transfuse prbc prn.       [facility] NEPHROLOGY  Sai Moreland M.D.  Matt Mcguire D.O.  Lo Tucker M.D.  Charlette Hobbs, MITRA, ANP-C  [phone]    [address] [place] [address]  
89 y/o female with PMHx of HTN, HLD presented with right hip pain, sp fall  backwards at home.   In ED:  Xray showed right displaced hip fracture.  Scheduled for Right Hip Hemiarthroplasty    Problem/Plan - 1:  ·  Problem: Fracture of right hip.   ·  Plan: CT pelvis confirmed: right intratrochanteric fracture as detailed above.   Moderate right adductor muscle hematoma  No Hx CAD HF or syncope  ECG unremarkable  Prior TTE 2015 EF 55%  Clinically eovolemic  Patient's Revised Cardiac Risk Index (RCRI) score is 0 ( 3.9 % risk) and Reis score is0.3 % risk. Patient is at intermediate  risk of  adverse perioperative cardiac events undergoing  intermediate risk surgery. No further cardiac testing is needed prior to patient's surgery.

## 2022-09-16 NOTE — PROGRESS NOTE ADULT - PROBLEM SELECTOR PLAN 8
-pending h/h and creatinine improvement with urine output, nephrology consulted.   D/c to [facility], family requesting [place]

## 2022-09-16 NOTE — PHYSICAL THERAPY INITIAL EVALUATION ADULT - PERTINENT HX OF CURRENT PROBLEM, REHAB EVAL
Dtr. provided patient's information. Dtr. states patient fell backwards in the kitchen; HHA was present to break the fall. Dtr. states patient had also fallen 2x in July. Patient w/ h/o Anemia, HTN and risk for fall.

## 2022-09-16 NOTE — PROGRESS NOTE ADULT - PROBLEM SELECTOR PLAN 2
Patient planned for IM Pinning of Right Hip Fracture today   Due to urgent surgery, patient medically optimized for surgery, patient is an intermediate-risk for an intermediate risk procedure  Cards risk strat noted:  "Patient's Revised Cardiac Risk Index (RCRI) score is 0 ( 3.9 % risk) and Reis score is 0.3 % risk. Patient is at intermediate  risk of  adverse perioperative cardiac events undergoing  intermediate risk surgery. No further cardiac testing is needed prior to patient's surgery. "  COVID positive, no need for isolation, pt tested positive in July 2022,   as per infection control no need to isolate if less than 90 days since the last COVID infection creatinine uptrending, 2.59  -oliguric  -valle catheter placed   -s/p IV bolus and now with 100/cc hr x24hr   -Nephro Dr. cannon consulted.   -avoid nephrotoxins hx of CKD stage 3  creatinine uptrending, 2.59  -oliguric  -valle catheter placed   -s/p IV bolus and now with 100/cc hr x24hr   -Nephro Dr. cannon consulted.   -avoid nephrotoxins

## 2022-09-16 NOTE — PROGRESS NOTE ADULT - PROBLEM SELECTOR PLAN 3
hx of anemia requiring iron transfusions   hgb on admission 8.2  has right adductor hematoma  - s/p 2 units prbc prior to OR   -today hgb 7.8, 1 unit PRBC today   -f/u CBC

## 2022-09-16 NOTE — PROGRESS NOTE ADULT - PROBLEM SELECTOR PLAN 2
hx of CKD stage 3  creatinine uptrending, 2.59  -oliguric  -valle catheter placed   -s/p IV bolus and now with 100/cc hr x24hr   -Nephro Dr. cannon consulted.   -avoid nephrotoxins

## 2022-09-16 NOTE — PROGRESS NOTE ADULT - ASSESSMENT
89 y/o female with PMHx of HTN, HLD presented with right hip pain, sp fall backwards at home. In ED:  Xray showed right displaced hip fracture, CXR with clear lungs. and Creatinine  1.3 (similar to creatinine from 2 months ago). Found to be positive for COVID:  asymptomatic-- prior COVID positive test in July. patient admitted for right intertrochanteric femoral fracture with CT pelvis confirmed for Comminuted right intratrochanteric fracture as detailed above. Moderate right adductor muscle hematoma. s/p OR 9/15 POD #1 R hip IM nailing. hospital course complicated by decreased urine output with no urine noted on bladder scan. bolus given and Guardado catheter inserted with minimal output. noted with uptrending creatinine. Nephro Dr. cannon consulted.   drop in H/h, 1 unit PRBC ordered.

## 2022-09-16 NOTE — PROGRESS NOTE ADULT - SUBJECTIVE AND OBJECTIVE BOX
NP Note discussed with  Primary Attending    Patient is a 89y old  Female who presents with a chief complaint of Right Hip fracture (16 Sep 2022 07:53)      INTERVAL HPI/OVERNIGHT EVENTS: no new complaints    MEDICATIONS  (STANDING):  acetaminophen     Tablet .. 1000 milliGRAM(s) Oral every 8 hours  enoxaparin Injectable 30 milliGRAM(s) SubCutaneous every 24 hours  metoprolol tartrate 25 milliGRAM(s) Oral two times a day  polyethylene glycol 3350 17 Gram(s) Oral daily  senna 2 Tablet(s) Oral at bedtime  simvastatin 40 milliGRAM(s) Oral at bedtime  sodium chloride 0.9%. 1000 milliLiter(s) (100 mL/Hr) IV Continuous <Continuous>  tamsulosin 0.4 milliGRAM(s) Oral at bedtime    MEDICATIONS  (PRN):  magnesium hydroxide Suspension 30 milliLiter(s) Oral daily PRN Constipation  metoclopramide Injectable 10 milliGRAM(s) IV Push once PRN Nausea and/or Vomiting  ondansetron Injectable 4 milliGRAM(s) IV Push every 6 hours PRN Nausea and/or Vomiting  oxyCODONE    IR 2.5 milliGRAM(s) Oral every 4 hours PRN Moderate Pain (4 - 6)  oxyCODONE    IR 5 milliGRAM(s) Oral every 4 hours PRN Severe Pain (7 - 10)      __________________________________________________  REVIEW OF SYSTEMS:    CONSTITUTIONAL: No fever,   EYES: no acute visual disturbances  NECK: No pain or stiffness  RESPIRATORY: No cough; No shortness of breath  CARDIOVASCULAR: No chest pain, no palpitations  GASTROINTESTINAL: No pain. No nausea or vomiting; No diarrhea   NEUROLOGICAL: No headache or numbness, no tremors  MUSCULOSKELETAL: No joint pain, no muscle pain  GENITOURINARY: no dysuria, no frequency, no hesitancy  PSYCHIATRY: no depression , no anxiety  ALL OTHER  ROS negative        Vital Signs Last 24 Hrs  T(C): 36.7 (16 Sep 2022 05:57), Max: 37.1 (15 Sep 2022 13:34)  T(F): 98.1 (16 Sep 2022 05:57), Max: 98.8 (15 Sep 2022 13:34)  HR: 82 (16 Sep 2022 05:57) (82 - 113)  BP: 103/64 (16 Sep 2022 05:57) (97/58 - 147/62)  BP(mean): 63 (15 Sep 2022 20:07) (63 - 83)  RR: 18 (16 Sep 2022 05:57) (16 - 20)  SpO2: 96% (16 Sep 2022 05:57) (96% - 100%)    Parameters below as of 16 Sep 2022 05:57  Patient On (Oxygen Delivery Method): nasal cannula  O2 Flow (L/min): 2      ________________________________________________  PHYSICAL EXAM:  GENERAL: NAD  HEENT: Normocephalic;  conjunctivae and sclerae clear; moist mucous membranes;   NECK : supple  CHEST/LUNG: Clear to auscultation bilaterally with good air entry   HEART: S1 S2  regular; no murmurs, gallops or rubs  ABDOMEN: Soft, Nontender, Nondistended; Bowel sounds present  EXTREMITIES: no cyanosis; no edema; no calf tenderness  SKIN: warm and dry; no rash  NERVOUS SYSTEM:  Awake and alert; Oriented  to place, person and time ; no new deficits    _________________________________________________  LABS:                        7.8    11.71 )-----------( 132      ( 16 Sep 2022 03:40 )             24.2     09-16    137  |  103  |  42<H>  ----------------------------<  144<H>  4.3   |  24  |  2.59<H>    Ca    7.4<L>      16 Sep 2022 03:40  Phos  3.8     09-14  Mg     2.7     09-14    TPro  7.4  /  Alb  3.3<L>  /  TBili  0.5  /  DBili  x   /  AST  19  /  ALT  16  /  AlkPhos  82  09-14    PT/INR - ( 14 Sep 2022 12:22 )   PT: 12.2 sec;   INR: 1.02 ratio         PTT - ( 14 Sep 2022 12:22 )  PTT:27.7 sec    CAPILLARY BLOOD GLUCOSE            RADIOLOGY & ADDITIONAL TESTS:    Imaging  Reviewed:  YES/NO    Consultant(s) Notes Reviewed:   YES/ No      Plan of care was discussed with patient and /or primary care giver; all questions and concerns were addressed  NP Note discussed with  Primary Attending    Patient is a 89y old  Female who presents with a chief complaint of Right Hip fracture (16 Sep 2022 07:53)    INTERVAL HPI/OVERNIGHT EVENTS: decreased urine output.     MEDICATIONS  (STANDING):  acetaminophen     Tablet .. 1000 milliGRAM(s) Oral every 8 hours  enoxaparin Injectable 30 milliGRAM(s) SubCutaneous every 24 hours  metoprolol tartrate 25 milliGRAM(s) Oral two times a day  polyethylene glycol 3350 17 Gram(s) Oral daily  senna 2 Tablet(s) Oral at bedtime  simvastatin 40 milliGRAM(s) Oral at bedtime  sodium chloride 0.9%. 1000 milliLiter(s) (100 mL/Hr) IV Continuous <Continuous>  tamsulosin 0.4 milliGRAM(s) Oral at bedtime    MEDICATIONS  (PRN):  magnesium hydroxide Suspension 30 milliLiter(s) Oral daily PRN Constipation  metoclopramide Injectable 10 milliGRAM(s) IV Push once PRN Nausea and/or Vomiting  ondansetron Injectable 4 milliGRAM(s) IV Push every 6 hours PRN Nausea and/or Vomiting  oxyCODONE    IR 2.5 milliGRAM(s) Oral every 4 hours PRN Moderate Pain (4 - 6)  oxyCODONE    IR 5 milliGRAM(s) Oral every 4 hours PRN Severe Pain (7 - 10)      __________________________________________________  REVIEW OF SYSTEMS:    CONSTITUTIONAL: No fever,   EYES: no acute visual disturbances  NECK: No pain or stiffness  RESPIRATORY: No cough; No shortness of breath  CARDIOVASCULAR: No chest pain, no palpitations  GASTROINTESTINAL: No pain. No nausea or vomiting; No diarrhea   NEUROLOGICAL: No headache or numbness, no tremors  MUSCULOSKELETAL: No joint pain, no muscle pain  GENITOURINARY: no dysuria, no frequency, no hesitancy  PSYCHIATRY: no depression , no anxiety  ALL OTHER  ROS negative        Vital Signs Last 24 Hrs  T(C): 36.7 (16 Sep 2022 05:57), Max: 37.1 (15 Sep 2022 13:34)  T(F): 98.1 (16 Sep 2022 05:57), Max: 98.8 (15 Sep 2022 13:34)  HR: 82 (16 Sep 2022 05:57) (82 - 113)  BP: 103/64 (16 Sep 2022 05:57) (97/58 - 147/62)  BP(mean): 63 (15 Sep 2022 20:07) (63 - 83)  RR: 18 (16 Sep 2022 05:57) (16 - 20)  SpO2: 96% (16 Sep 2022 05:57) (96% - 100%)    Parameters below as of 16 Sep 2022 05:57  Patient On (Oxygen Delivery Method): nasal cannula  O2 Flow (L/min): 2      ________________________________________________  PHYSICAL EXAM:  GENERAL: NAD  HEENT: Normocephalic;  conjunctivae and sclerae clear; moist mucous membranes;   NECK : supple  CHEST/LUNG: Clear to auscultation bilaterally with good air entry   HEART: S1 S2  regular; no murmurs, gallops or rubs  ABDOMEN: Soft, Nontender, Nondistended; Bowel sounds present. valle catheter with <100cc dark orange urine noted.   EXTREMITIES: no cyanosis; no edema; no calf tenderness. R Hip Dressing is C/D/I.   SKIN: warm and dry; no rash  NERVOUS SYSTEM:  Awake and alert ; no new deficits    _________________________________________________  LABS:                        7.8    11.71 )-----------( 132      ( 16 Sep 2022 03:40 )             24.2     09-16    137  |  103  |  42<H>  ----------------------------<  144<H>  4.3   |  24  |  2.59<H>    Ca    7.4<L>      16 Sep 2022 03:40  Phos  3.8     09-14  Mg     2.7     09-14    TPro  7.4  /  Alb  3.3<L>  /  TBili  0.5  /  DBili  x   /  AST  19  /  ALT  16  /  AlkPhos  82  09-14    PT/INR - ( 14 Sep 2022 12:22 )   PT: 12.2 sec;   INR: 1.02 ratio         PTT - ( 14 Sep 2022 12:22 )  PTT:27.7 sec    CAPILLARY BLOOD GLUCOSE      RADIOLOGY & ADDITIONAL TESTS:  < from: CT Pelvis Bony Only No Cont (09.13.22 @ 20:33) >    ACC: [id number] EXAM:  CT PELVIS BONY ONLY                          PROCEDURE DATE:  09/13/2022          INTERPRETATION:  CT PELVIS BONY dated 9/13/2022 8:33 PM    INDICATION: Pain after fall.    COMPARISON: Hip radiographs of the same date    TECHNIQUE: CT imaging of the pelvis was performed. The data was   reformatted in the axial, coronal, and sagittal planes. Additionally, 3-D   reformatted imaging was created.    FINDINGS:    OSSEOUS STRUCTURES: Acute comminuted intratrochanteric fracture with   impaction of the right proximal femur. There is involvement of the   femoral neck and mild extension into the subtrochanteric region. Mild   avulsion of the lesser trochanter. Mild displacement of the fracture   fragments. There are old fracturesof the left superior and inferior   pubic rami without osseous union appreciated. Narrowing the hip joint   spaces. No dislocation. Old left sacral alar fracture. Sclerosis and   degenerative changes of both sacroiliac joints.  SYNOVIUM/ JOINT FLUID:Small right hip effusion.  TENDONS: Calcifications at the proximal hamstring tendons bilaterally.   Suspect partial tearing of the gluteus medius tendon on the right.  MUSCLES: Moderate enlargement of the right adductor muscles in keeping   with intramuscular hematoma.  NEUROVASCULAR STRUCTURES: Vascular calcifications are noted.  INTRAPELVIC SOFT TISSUES: Valle catheter and gas within a nondistended   urinary bladder.  SUBCUTANEOUS SOFT TISSUES: Mild soft tissue swelling about the pelvis.    3-Dreformatted imaging confirms these findings.    IMPRESSION:    1.  Comminuted right intratrochanteric fracture as detailed above.  2.  Moderate right adductor muscle hematoma.  3.  Old fractures of left superior and inferior pubic rami without   osseous healing.  4.  Old left sacral alar fracture deformity.  5.  Degenerative changes.    --- End of Report ---      JANA NICHOLAS MD; Attending Radiologist  This document has been electronically signed. Sep 13 2022  8:56PM    < end of copied text >    Imaging  Reviewed:  YES     Consultant(s) Notes Reviewed:   YES     Plan of care was discussed with patient and /or primary care giver; all questions and concerns were addressed

## 2022-09-16 NOTE — PROGRESS NOTE ADULT - SUBJECTIVE AND OBJECTIVE BOX
89yFemale    Diagnosis:  S/p Right Hip IM Nailing POD#1    Patient was seen and evaluated at bedside. Patient with no acute complaints.   Denies dizziness, chest pain, abdominal pain, palpitations.   Pain is  well controlled.    Denies CP/SOB, dyspnea, paresthesias, N/V/D, palpitations.     Vital Signs Last 24 Hrs  T(C): 36.7 (09-16-22 @ 05:57), Max: 37.1 (09-15-22 @ 13:34)  T(F): 98.1 (09-16-22 @ 05:57), Max: 98.8 (09-15-22 @ 13:34)  HR: 82 (09-16-22 @ 05:57) (82 - 113)  BP: 103/64 (09-16-22 @ 05:57) (97/58 - 147/62)  BP(mean): 63 (09-15-22 @ 20:07) (63 - 83)  RR: 18 (09-16-22 @ 05:57) (16 - 20)  SpO2: 96% (09-16-22 @ 05:57) (96% - 100%)    I&O's Summary    15 Sep 2022 07:01  -  16 Sep 2022 07:00  --------------------------------------------------------  IN: 0 mL / OUT: 100 mL / NET: -100 mL            Physical Exam:    General:NAD, resting comfortably in bed.    Right Hip:  Dressing is C/D/I. Skin is pink and warm.  No erythema. SILT.  Wound with no drainage, healing well.   Lower extremity:  No calf tenderness, calves are soft. 2+pulses. NVI. 5/5 Strength of EHL/TA/gastrocnemius B/L.  Good capillary refill. Warm, well-perfused.                                      7.8    11.71 )-----------( 132      ( 16 Sep 2022 03:40 )             24.2   09-16    137  |  103  |  42<H>  ----------------------------<  144<H>  4.3   |  24  |  2.59<H>    Ca    7.4<L>      16 Sep 2022 03:40  Phos  3.8     09-14  Mg     2.7     09-14    TPro  7.4  /  Alb  3.3<L>  /  TBili  0.5  /  DBili  x   /  AST  19  /  ALT  16  /  AlkPhos  82  09-14        Impression:  89yFemale S/p Right Hip IM Nailing POD#1 with acute blood loss anemia.  Plan:  - recommend 1 unit prbc  -  Pain management  -  Dvt prophylaxis with venodynes and lovenox   -  Daily Physical Therapy:  WBAT of the right lower extremity  -  Discharge planning: Rehab - family requesting [facility]  -  Continue with Post-op Antibiotics x 24hrs  -  Case d/w Dr. Encinas    89yFemale    Diagnosis:  S/p Right Hip IM Nailing POD#1    Patient was seen and evaluated at bedside. Patient with no acute complaints.   Japanese translation [id number]  Denies dizziness, chest pain, abdominal pain, palpitations.   Pain is  well controlled.    Denies CP/SOB, dyspnea, paresthesias, N/V/D, palpitations.     Vital Signs Last 24 Hrs  T(C): 36.7 (09-16-22 @ 05:57), Max: 37.1 (09-15-22 @ 13:34)  T(F): 98.1 (09-16-22 @ 05:57), Max: 98.8 (09-15-22 @ 13:34)  HR: 82 (09-16-22 @ 05:57) (82 - 113)  BP: 103/64 (09-16-22 @ 05:57) (97/58 - 147/62)  BP(mean): 63 (09-15-22 @ 20:07) (63 - 83)  RR: 18 (09-16-22 @ 05:57) (16 - 20)  SpO2: 96% (09-16-22 @ 05:57) (96% - 100%)    I&O's Summary    15 Sep 2022 07:01  -  16 Sep 2022 07:00  --------------------------------------------------------  IN: 0 mL / OUT: 100 mL / NET: -100 mL            Physical Exam:    General:NAD, resting comfortably in bed.    Right Hip:  Dressing is C/D/I. Skin is pink and warm.  No erythema. SILT.  Wound with no drainage, healing well.   Lower extremity:  No calf tenderness, calves are soft. 2+pulses. NVI. 5/5 Strength of EHL/TA/gastrocnemius B/L.  Good capillary refill. Warm, well-perfused.                                      7.8    11.71 )-----------( 132      ( 16 Sep 2022 03:40 )             24.2   09-16    137  |  103  |  42<H>  ----------------------------<  144<H>  4.3   |  24  |  2.59<H>    Ca    7.4<L>      16 Sep 2022 03:40  Phos  3.8     09-14  Mg     2.7     09-14    TPro  7.4  /  Alb  3.3<L>  /  TBili  0.5  /  DBili  x   /  AST  19  /  ALT  16  /  AlkPhos  82  09-14        Impression:  89yFemale S/p Right Hip IM Nailing POD#1 with acute blood loss anemia.  Plan:  - recommend 1 unit prbc  -  Pain management  -  Dvt prophylaxis with venodynes and lovenox   -  Daily Physical Therapy:  WBAT of the right lower extremity  -  Discharge planning: Rehab - family requesting [facility]  -  Continue with Post-op Antibiotics x 24hrs  -  Case d/w Dr. Encinas

## 2022-09-16 NOTE — PROGRESS NOTE ADULT - PROBLEM SELECTOR PLAN 4
on metoprolol   - continue metoprolol  - monitor BP on metoprolol   -controlled  - continue metoprolol with parameters  - monitor BP

## 2022-09-16 NOTE — PROGRESS NOTE ADULT - NS ATTEND AMEND GEN_ALL_CORE FT
89 y/o female with PMHx of HTN, HLD presented with right hip pain, sp fall  backwards at home. Admit for R hip fracture and RAMSEY on CKD, anemia.      #Right hip fracture s/p IM nailing 9/15  #RAMSEY on Stage 3 CKD  #Rt adductor muscle hematoma   #Acute blood loss anemia, Chronic Iron deficiency anemia   #HTN   #HLD  -Worsenign Cr and minimal UOP - Nephrology consult, continue IVF  -Patient tolerated procedure well  -Pain management following, cont with bowel regimen  -Monitor leukocytosis, no active s/s of infection, send sepsis w/u if persistent leukocytosis   - PT - LYNN  - Downtredning Hgb- 1U pRBC   -Incentive spirometry  -Cont BB with holding parameter   -DVT ppx

## 2022-09-16 NOTE — PROGRESS NOTE ADULT - SUBJECTIVE AND OBJECTIVE BOX
PATIENT SEEN AND EXAMINED ON :- 9/16/22  DATE OF SERVICE:   9/16/22          Interim events noted,Labs ,Radiological studies and Cardiology tests reviewed .       HOSPITAL COURSE: HPI:  Patient is a 89 yo [place]ian speaking female (accompanied by her son Isac, who provides translation) with PMH HTN, anemia requiring Iron transfusions (last one before COVID), HLD BIBEMS to the ED several hours after a witnessed fall. According to patient, she tripped and fell backwards in her kitchen and home health aide caught the patient's back before it hit the ground patient landed on her R buttock but without striking her head. Patient denies any dizziness, weakness, visual or neurological symptoms before falling. Patient reports 8/10 sharp, throbbing pain on the right hip. Pain is non radiating and worsened with movement but made better with the morphine. Patient denies any syncope. Pt denies any CP, SOB, abdominal pain, nausea vomiting or changes in bowel habits. Patient denies fevers or chills. At baseline, patient can perform ADLs, walks with no devices, and can walk on a flat surface and incline with no symptoms.   (13 Sep 2022 17:27)      INTERIM EVENTS:Patient seen at bedside ,interim events noted.      PMH -reviewed admission note, no change since admission  HEART FAILURE: Acute[ ]Chronic[ ] Systolic[ ] Diastolic[ ] Combined Systolic and Diastolic[ ]  CAD[ ] CABG[ ] PCI[ ]  DEVICES[ ] PPM[ ] ICD[ ] ILR[ ]  ATRIAL FIBRILLATION[ ] Paroxysmal[ ] Permanent[ ] CHADS2-[  ]  RAMSEY[ ] CKD1[ ] CKD2[ ] CKD3[ ] CKD4[ ] ESRD[ ]  COPD[ ] HTN[ ]   DM[ ] Type1[ ] Type 2[ ]   CVA[ ] Paresis[ ]    AMBULATION: Assisted[ ] Cane/walker[ ] Independent[ ]    MEDICATIONS  (STANDING):  acetaminophen     Tablet .. 1000 milliGRAM(s) Oral every 8 hours  enoxaparin Injectable 30 milliGRAM(s) SubCutaneous every 24 hours  metoprolol tartrate 25 milliGRAM(s) Oral two times a day  polyethylene glycol 3350 17 Gram(s) Oral two times a day  senna 2 Tablet(s) Oral at bedtime  simvastatin 40 milliGRAM(s) Oral at bedtime  sodium chloride 0.9%. 1000 milliLiter(s) (100 mL/Hr) IV Continuous <Continuous>  tamsulosin 0.4 milliGRAM(s) Oral at bedtime    MEDICATIONS  (PRN):  metoclopramide Injectable 10 milliGRAM(s) IV Push once PRN Nausea and/or Vomiting  ondansetron Injectable 4 milliGRAM(s) IV Push every 6 hours PRN Nausea and/or Vomiting  oxyCODONE    IR 2.5 milliGRAM(s) Oral every 4 hours PRN Moderate Pain (4 - 6)  oxyCODONE    IR 5 milliGRAM(s) Oral every 4 hours PRN Severe Pain (7 - 10)            REVIEW OF SYSTEMS:  Constitutional: [ ] fever, [ ]weight loss,  [ ]fatigue [ ]weight gain  Eyes: [ ] visual changes  Respiratory: [ ]shortness of breath;  [ ] cough, [ ]wheezing, [ ]chills, [ ]hemoptysis  Cardiovascular: [ ] chest pain, [ ]palpitations, [ ]dizziness,  [ ]leg swelling[ ]orthopnea[ ]PND  Gastrointestinal: [ ] abdominal pain, [ ]nausea, [ ]vomiting,  [ ]diarrhea [ ]Constipation [ ]Melena  Genitourinary: [ ] dysuria, [ ] hematuria [ ]Guardado  Neurologic: [ ] headaches [ ] tremors[ ]weakness [ ]Paralysis Right[ ] Left[ ]  Skin: [ ] itching, [ ]burning, [ ] rashes  Endocrine: [ ] heat or cold intolerance  Musculoskeletal: [ ] joint pain or swelling; [ ] muscle, back, or extremity pain  Psychiatric: [ ] depression, [ ]anxiety, [ ]mood swings, or [ ]difficulty sleeping  Hematologic: [ ] easy bruising, [ ] bleeding gums    [ ] All remaining systems negative except as per above.   [ ]Unable to obtain.  [x] No change in ROS since admission      Vital Signs Last 24 Hrs  T(C): 36.9 (16 Sep 2022 21:10), Max: 37 (16 Sep 2022 13:09)  T(F): 98.5 (16 Sep 2022 21:10), Max: 98.6 (16 Sep 2022 13:09)  HR: 94 (16 Sep 2022 21:10) (82 - 99)  BP: 122/46 (16 Sep 2022 21:10) (97/58 - 127/55)  BP(mean): --  RR: 17 (16 Sep 2022 21:10) (16 - 18)  SpO2: 97% (16 Sep 2022 21:10) (96% - 100%)    Parameters below as of 16 Sep 2022 21:10  Patient On (Oxygen Delivery Method): nasal cannula  O2 Flow (L/min): 2    I&O's Summary    15 Sep 2022 07:01  -  16 Sep 2022 07:00  --------------------------------------------------------  IN: 0 mL / OUT: 100 mL / NET: -100 mL    16 Sep 2022 07:01  -  16 Sep 2022 21:20  --------------------------------------------------------  IN: 0 mL / OUT: 75 mL / NET: -75 mL        PHYSICAL EXAM:  General: No acute distress BMI-  HEENT: EOMI, PERRL  Neck: Supple, [ ] JVD  Lungs: Equal air entry bilaterally; [ ] rales [ ] wheezing [ ] rhonchi  Heart: Regular rate and rhythm; [x ] murmur   2/6 [ x] systolic [ ] diastolic [ ] radiation[ ] rubs [ ]  gallops  Abdomen: Nontender, bowel sounds present  Extremities: No clubbing, cyanosis, [ ] edema [ ]Pulses  equal and intact  Nervous system:  Alert & Oriented X3, no focal deficits  Psychiatric: Normal affect  Skin: No rashes or lesions    LABS:  09-16    137  |  103  |  42<H>  ----------------------------<  144<H>  4.3   |  24  |  2.59<H>    Ca    7.4<L>      16 Sep 2022 03:40      Creatinine Trend: 2.59<--, 2.37<--, 1.77<--, 1.31<--, 1.25<--, 1.31<--                        7.8    11.71 )-----------( 132      ( 16 Sep 2022 03:40 )             24.2

## 2022-09-16 NOTE — PROGRESS NOTE ADULT - PROBLEM SELECTOR PLAN 7
WBC 17 today, afebrile, may be reactive to R hip trauma?  Notified Ortho team who will proceed with OR today. - SCDs for DVT prophylaxis

## 2022-09-16 NOTE — CONSULT NOTE ADULT - SUBJECTIVE AND OBJECTIVE BOX
[facility] NEPHROLOGY- CONSULTATION NOTE    Patient is a 89 yo Palestinian speaking female with h/o HTN, iron def anemia, HLD  presents with rt hip pain s/p fall. Found to have right intratrochanteric fracture s/p OR 9/15 with insertion of locking intramedullary lupe into right hip. Hospital course complicated by RAMSEY. Nephrology consulted for Elevated serum creatinine.    Used Palestinian  # [id number]  Pt states she is aware of CKD but does not follow with a Nephrologist. Pt denies any SOB, chest pain, n/v/d, abd pain or urinary complaints. Rt hip- denies any current pain. +rt knee pain +constipated - last BM   +intermittent LE edema      PAST MEDICAL & SURGICAL HISTORY:  Hyperlipidemia      Arthritis      Anemia      Neuropathy      Hypertension      History of Cholecystectomy      Gall Bladder Disease  Cholecystectomy        Hip fracture  s/p surgery in 2015 -- pt&#x27;s son denies?        No Known Allergies    Home Medications Reviewed  Hospital Medications:   MEDICATIONS  (STANDING):  acetaminophen     Tablet .. 1000 milliGRAM(s) Oral every 8 hours  enoxaparin Injectable 30 milliGRAM(s) SubCutaneous every 24 hours  metoprolol tartrate 25 milliGRAM(s) Oral two times a day  polyethylene glycol 3350 17 Gram(s) Oral daily  senna 2 Tablet(s) Oral at bedtime  simvastatin 40 milliGRAM(s) Oral at bedtime  sodium chloride 0.9%. 1000 milliLiter(s) (100 mL/Hr) IV Continuous <Continuous>  tamsulosin 0.4 milliGRAM(s) Oral at bedtime    SOCIAL HISTORY:  Denies ETOh, Smoking, or drug use  FAMILY HISTORY:  No pertinent family history in first degree relatives        REVIEW OF SYSTEMS:  Gen: no changes in weight  HEENT: no rhinorrhea  Neck: no sore throat  Cards: no chest pain  Resp: no dyspnea  GI: no nausea or vomiting or diarrhea +constipated  : no dysuria or hematuria  Vascular: + LE edema with pain  Denies any current rt hip pain +rt knee pain  Derm: no rashes  Neuro: no numbness/tingling  All other review of systems is negative unless indicated above.    VITALS:  T(F): 98.6 (22 @ 13:09), Max: 98.6 (22 @ 13:09)  HR: 93 (22 @ 13:09)  BP: 117/40 (22 @ 13:09)  RR: 17 (22 @ 13:09)  SpO2: 100% (22 @ 13:09)  Wt(kg): --    09-15 @ 07:01  -   @ 07:00  --------------------------------------------------------  IN: 0 mL / OUT: 100 mL / NET: -100 mL        PHYSICAL EXAM:  Gen: NAD, calm  HEENT: anicteric   Neck: no JVD  Cards: RRR, +S1/S2, no M/G/R  Resp: CTA B/L  GI: soft, NT/ND, NABS  : +valle   Extremities: no LE edema B/L but very tender b/l  Rt hip bandage  Derm: no rashes  Neuro: non-focal    LABS:      137  |  103  |  42<H>  ----------------------------<  144<H>  4.3   |  24  |  2.59<H>    Ca    7.4<L>      16 Sep 2022 03:40      Creatinine Trend: 2.59 <--, 2.37 <--, 1.77 <--, 1.31 <--, 1.25 <--, 1.31 <--                        7.8    11.71 )-----------( 132      ( 16 Sep 2022 03:40 )             24.2     Urine Studies:  Urinalysis Basic - ( 13 Sep 2022 21:20 )    Color: Yellow / Appearance: Clear / S.020 / pH:   Gluc:  / Ketone: Trace  / Bili: Negative / Urobili: Negative   Blood:  / Protein: 30 mg/dL / Nitrite: Negative   Leuk Esterase: Negative / RBC: 5-10 /HPF / WBC 0-2 /HPF   Sq Epi:  / Non Sq Epi: Few /HPF / Bacteria: Few /HPF        RADIOLOGY & ADDITIONAL STUDIES:    < from: CT Pelvis Bony Only No Cont (22 @ 20:33) >    ACC: [id number] EXAM:  CT PELVIS BONY ONLY                          PROCEDURE DATE:  2022        < end of copied text >  < from: CT Pelvis Bony Only No Cont (22 @ 20:33) >  IMPRESSION:    1.  Comminuted right intratrochanteric fracture as detailed above.  2.  Moderate right adductor muscle hematoma.  3.  Old fractures of left superior and inferior pubic rami without   osseous healing.  4.  Old left sacral alar fracture deformity.  5.  Degenerative changes.    --- End of Report ---      < end of copied text >      < from: Xray Chest 1 View AP/PA (22 @ 15:09) >    ACC: [id number] EXAM:  XR CHEST AP OR PA 1V                          PROCEDURE DATE:  2022          INTERPRETATION:  Chest one view    HISTORY: Patient fell    COMPARISON STUDY: 2022    Frontal expiratory view of the chest shows the heart to be borderline in   size. The lungs are clear and there is no evidence of pneumothorax nor   pleural effusion.    IMPRESSION:  No active pulmonary disease.        Thank you for the courtesy of this referral.    --- End of Report ---      < end of copied text >

## 2022-09-16 NOTE — PROGRESS NOTE ADULT - PROBLEM SELECTOR PLAN 1
CT pelvis confirmed: right intratrochanteric fracture as detailed above.   Moderate right adductor muscle hematoma.  - ortho on board   - plan for IM nailing of right hip today 9/15  - NPO, IV fluids   - pain management: Hydromorphone 0.5 mg  IV Push every 6 hours PRN Severe Pain   oxycodone 5 mg Oral every 6 hours PRN Moderate Pain CT pelvis confirmed: right intratrochanteric fracture as detailed above.   Moderate right adductor muscle hematoma.  - ortho on board   -s/p IM nailing of right hip 9/15, POD #1   - pain management: Hydromorphone 0.5 mg  IV Push every 6 hours PRN Severe Pain   oxycodone 5 mg Oral every 6 hours PRN Moderate Pain

## 2022-09-16 NOTE — PROGRESS NOTE ADULT - PROBLEM SELECTOR PLAN 3
hx of anemia requiring iron transfusions   hgb on admission 8.2  has right adductor hematoma  - will transfuse 2 units PRBCs as per ortho with goal hgb 10  - Today Hgb is 11.8, will follow post-op. hx of anemia requiring iron transfusions   hgb on admission 8.2  has right adductor hematoma  - s/p 2 units prbc prior to OR   -today hgb 7.8, 1 unit PRBC today   -f/u CBC

## 2022-09-17 LAB
ANION GAP SERPL CALC-SCNC: 13 MMOL/L — SIGNIFICANT CHANGE UP (ref 5–17)
ANION GAP SERPL CALC-SCNC: 9 MMOL/L — SIGNIFICANT CHANGE UP (ref 5–17)
BASOPHILS # BLD AUTO: 0.01 K/UL — SIGNIFICANT CHANGE UP (ref 0–0.2)
BASOPHILS NFR BLD AUTO: 0.1 % — SIGNIFICANT CHANGE UP (ref 0–2)
BLD GP AB SCN SERPL QL: SIGNIFICANT CHANGE UP
BUN SERPL-MCNC: 52 MG/DL — HIGH (ref 7–18)
BUN SERPL-MCNC: 56 MG/DL — HIGH (ref 7–18)
C3 SERPL-MCNC: 118 MG/DL — SIGNIFICANT CHANGE UP (ref 81–157)
C4 SERPL-MCNC: 22 MG/DL — SIGNIFICANT CHANGE UP (ref 13–39)
CALCIUM SERPL-MCNC: 6.8 MG/DL — LOW (ref 8.4–10.5)
CALCIUM SERPL-MCNC: 7.1 MG/DL — LOW (ref 8.4–10.5)
CHLORIDE SERPL-SCNC: 103 MMOL/L — SIGNIFICANT CHANGE UP (ref 96–108)
CHLORIDE SERPL-SCNC: 106 MMOL/L — SIGNIFICANT CHANGE UP (ref 96–108)
CHLORIDE UR-SCNC: 30 MMOL/L — SIGNIFICANT CHANGE UP
CK SERPL-CCNC: 762 U/L — HIGH (ref 21–215)
CO2 SERPL-SCNC: 21 MMOL/L — LOW (ref 22–31)
CO2 SERPL-SCNC: 21 MMOL/L — LOW (ref 22–31)
CREAT ?TM UR-MCNC: 84 MG/DL — SIGNIFICANT CHANGE UP
CREAT SERPL-MCNC: 2.95 MG/DL — HIGH (ref 0.5–1.3)
CREAT SERPL-MCNC: 3 MG/DL — HIGH (ref 0.5–1.3)
EGFR: 14 ML/MIN/1.73M2 — LOW
EGFR: 14 ML/MIN/1.73M2 — LOW
EOSINOPHIL # BLD AUTO: 0.01 K/UL — SIGNIFICANT CHANGE UP (ref 0–0.5)
EOSINOPHIL NFR BLD AUTO: 0.1 % — SIGNIFICANT CHANGE UP (ref 0–6)
FERRITIN SERPL-MCNC: 238 NG/ML — HIGH (ref 15–150)
GLUCOSE SERPL-MCNC: 107 MG/DL — HIGH (ref 70–99)
GLUCOSE SERPL-MCNC: 120 MG/DL — HIGH (ref 70–99)
HCT VFR BLD CALC: 24 % — LOW (ref 34.5–45)
HCT VFR BLD CALC: 26.4 % — LOW (ref 34.5–45)
HGB BLD-MCNC: 7.7 G/DL — LOW (ref 11.5–15.5)
HGB BLD-MCNC: 8.8 G/DL — LOW (ref 11.5–15.5)
IMM GRANULOCYTES NFR BLD AUTO: 0.7 % — SIGNIFICANT CHANGE UP (ref 0–0.9)
IRON SATN MFR SERPL: 10 UG/DL — LOW (ref 40–150)
IRON SATN MFR SERPL: 7 % — LOW (ref 15–50)
LYMPHOCYTES # BLD AUTO: 0.5 K/UL — LOW (ref 1–3.3)
LYMPHOCYTES # BLD AUTO: 5.3 % — LOW (ref 13–44)
MCHC RBC-ENTMCNC: 31 PG — SIGNIFICANT CHANGE UP (ref 27–34)
MCHC RBC-ENTMCNC: 31.5 PG — SIGNIFICANT CHANGE UP (ref 27–34)
MCHC RBC-ENTMCNC: 32.1 GM/DL — SIGNIFICANT CHANGE UP (ref 32–36)
MCHC RBC-ENTMCNC: 33.3 GM/DL — SIGNIFICANT CHANGE UP (ref 32–36)
MCV RBC AUTO: 94.6 FL — SIGNIFICANT CHANGE UP (ref 80–100)
MCV RBC AUTO: 96.8 FL — SIGNIFICANT CHANGE UP (ref 80–100)
MONOCYTES # BLD AUTO: 0.45 K/UL — SIGNIFICANT CHANGE UP (ref 0–0.9)
MONOCYTES NFR BLD AUTO: 4.8 % — SIGNIFICANT CHANGE UP (ref 2–14)
NEUTROPHILS # BLD AUTO: 8.42 K/UL — HIGH (ref 1.8–7.4)
NEUTROPHILS NFR BLD AUTO: 89 % — HIGH (ref 43–77)
NRBC # BLD: 0 /100 WBCS — SIGNIFICANT CHANGE UP (ref 0–0)
NRBC # BLD: 0 /100 WBCS — SIGNIFICANT CHANGE UP (ref 0–0)
PLATELET # BLD AUTO: 122 K/UL — LOW (ref 150–400)
PLATELET # BLD AUTO: 136 K/UL — LOW (ref 150–400)
POTASSIUM SERPL-MCNC: 4.1 MMOL/L — SIGNIFICANT CHANGE UP (ref 3.5–5.3)
POTASSIUM SERPL-MCNC: 4.3 MMOL/L — SIGNIFICANT CHANGE UP (ref 3.5–5.3)
POTASSIUM SERPL-SCNC: 4.1 MMOL/L — SIGNIFICANT CHANGE UP (ref 3.5–5.3)
POTASSIUM SERPL-SCNC: 4.3 MMOL/L — SIGNIFICANT CHANGE UP (ref 3.5–5.3)
RBC # BLD: 2.48 M/UL — LOW (ref 3.8–5.2)
RBC # BLD: 2.79 M/UL — LOW (ref 3.8–5.2)
RBC # FLD: 16 % — HIGH (ref 10.3–14.5)
RBC # FLD: 16.4 % — HIGH (ref 10.3–14.5)
SODIUM SERPL-SCNC: 136 MMOL/L — SIGNIFICANT CHANGE UP (ref 135–145)
SODIUM SERPL-SCNC: 137 MMOL/L — SIGNIFICANT CHANGE UP (ref 135–145)
SODIUM UR-SCNC: 39 MMOL/L — SIGNIFICANT CHANGE UP
TIBC SERPL-MCNC: 147 UG/DL — LOW (ref 250–450)
UIBC SERPL-MCNC: 137 UG/DL — SIGNIFICANT CHANGE UP (ref 110–370)
UUN UR-MCNC: 437 MG/DL — SIGNIFICANT CHANGE UP
WBC # BLD: 10.03 K/UL — SIGNIFICANT CHANGE UP (ref 3.8–10.5)
WBC # BLD: 9.46 K/UL — SIGNIFICANT CHANGE UP (ref 3.8–10.5)
WBC # FLD AUTO: 10.03 K/UL — SIGNIFICANT CHANGE UP (ref 3.8–10.5)
WBC # FLD AUTO: 9.46 K/UL — SIGNIFICANT CHANGE UP (ref 3.8–10.5)

## 2022-09-17 PROCEDURE — 99233 SBSQ HOSP IP/OBS HIGH 50: CPT

## 2022-09-17 RX ADMIN — Medication 1000 MILLIGRAM(S): at 05:44

## 2022-09-17 RX ADMIN — Medication 1000 MILLIGRAM(S): at 22:32

## 2022-09-17 RX ADMIN — SIMVASTATIN 40 MILLIGRAM(S): 20 TABLET, FILM COATED ORAL at 21:37

## 2022-09-17 RX ADMIN — POLYETHYLENE GLYCOL 3350 17 GRAM(S): 17 POWDER, FOR SOLUTION ORAL at 05:44

## 2022-09-17 RX ADMIN — Medication 1000 MILLIGRAM(S): at 13:13

## 2022-09-17 RX ADMIN — SENNA PLUS 2 TABLET(S): 8.6 TABLET ORAL at 21:37

## 2022-09-17 RX ADMIN — Medication 1000 MILLIGRAM(S): at 21:37

## 2022-09-17 RX ADMIN — ENOXAPARIN SODIUM 30 MILLIGRAM(S): 100 INJECTION SUBCUTANEOUS at 00:32

## 2022-09-17 RX ADMIN — OXYCODONE HYDROCHLORIDE 5 MILLIGRAM(S): 5 TABLET ORAL at 10:39

## 2022-09-17 RX ADMIN — Medication 1000 MILLIGRAM(S): at 06:33

## 2022-09-17 NOTE — PROGRESS NOTE ADULT - SUBJECTIVE AND OBJECTIVE BOX
[facility] [place]    Patient is a 89 yo Sri Lankan speaking female with h/o HTN, iron def anemia, HLD  presents with rt hip pain s/p fall. Found to have right intratrochanteric fracture s/p OR 9/15 with insertion of locking intramedullary lupe into right hip. Hospital course complicated by RAMSEY. Nephrology consulted for Elevated serum creatinine.    Hospital Medications: Medications reviewed.  REVIEW OF SYSTEMS:  CONSTITUTIONAL: No fevers or chills  RESPIRATORY: No shortness of breath  CARDIOVASCULAR: No chest pain.  GASTROINTESTINAL: No nausea, vomiting, diarrhea +constipated No abdominal pain.   VASCULAR: No bilateral lower extremity edema.  +rt hip /  Rt knee pain, b/l heel pain    VITALS:  T(F): 97.9 (22 @ 14:28), Max: 98.5 (22 @ 21:10)  HR: 90 (22 @ 14:28)  BP: 101/52 (22 @ 14:28)  RR: 18 (22 @ 14:28)  SpO2: 96% (22 @ 14:28)  Wt(kg): --  Height (cm): 162.6 ( @ 13:04)  Weight (kg): 68 ( @ 13:04)  BMI (kg/m2): 25.7 ( 13:04)  BSA (m2): 1.73 ( @ 13:04)     @ 07:01  -   @ 07:00  --------------------------------------------------------  IN: 0 mL / OUT: 350 mL / NET: -350 mL     @ 07:01  -   @ 18:50  --------------------------------------------------------  IN: 0 mL / OUT: 200 mL / NET: -200 mL      PHYSICAL EXAM:  Gen: NAD, calm  HEENT: anicteric   Neck: no JVD  Cards: RRR, +S1/S2, no M/G/R  Resp: CTA B/L  GI: soft, NT/ND, NABS  : +valle   Extremities: no LE edema B/L but very tender b/l  Rt hip bandage    LABS:      136  |  106  |  52<H>  ----------------------------<  120<H>  4.3   |  21<L>  |  2.95<H>    Ca    6.8<L>      17 Sep 2022 18:00      Creatinine Trend: 2.95 <--, 3.00 <--, 2.59 <--, 2.37 <--, 1.77 <--, 1.31 <--, 1.25 <--, 1.31 <--                        8.8    10.03 )-----------( 136      ( 17 Sep 2022 18:00 )             26.4     Urine Studies:  Urinalysis Basic - ( 13 Sep 2022 21:20 )    Color: Yellow / Appearance: Clear / S.020 / pH:   Gluc:  / Ketone: Trace  / Bili: Negative / Urobili: Negative   Blood:  / Protein: 30 mg/dL / Nitrite: Negative   Leuk Esterase: Negative / RBC: 5-10 /HPF / WBC 0-2 /HPF   Sq Epi:  / Non Sq Epi: Few /HPF / Bacteria: Few /HPF      Sodium, Random Urine: 39 mmol/L ( @ 10:40)  Chloride, Random Urine: 30 mmol/L ( @ 10:40)  Creatinine, Random Urine: 84 mg/dL ( @ 10:40)    RADIOLOGY & ADDITIONAL STUDIES:    < from: US Renal (22 @ 15:08) >    ACC: [id number] EXAM:  US KIDNEY(S)                          PROCEDURE DATE:  2022          INTERPRETATION:  CLINICAL INFORMATION: Acute kidney injury. Evaluate for   urinary retention.    COMPARISON: None available.    TECHNIQUE: Sonography ofthe kidneys and bladder.    FINDINGS:  Right kidney: 8.3 cm. No solid renal mass, hydronephrosis or calculi.   There are cysts measuring up to 1.5 cm.    Left kidney: 8.9 cm. No solid renal mass, hydronephrosis or calculi.   There is a 1.3 cm lower pole cyst.    Urinary bladder: Not visualized.    IMPRESSION:  Bilateral renal cysts.    No hydronephrosis bilaterally.    --- End of Report ---            CONG FLOWERS MD; Attending Radiologist  This document has been electronically signed. Sep 16 2022 3:26PM    < end of copied text >

## 2022-09-17 NOTE — PROGRESS NOTE ADULT - TIME BILLING
- Review of records, telemetry, vital signs and daily labs.   - General and cardiovascular physical examination.  - Generation of cardiovascular treatment plan.  - Coordination of care.      Patient was seen and examined by me on 9/16/22,interim events noted,labs and radiology studies reviewed.  Aniceto Dorsey MD,FACC.  [address] [place], [place] [id number].  [phone]
- Review of records, telemetry, vital signs and daily labs.   - General and cardiovascular physical examination.  - Generation of cardiovascular treatment plan.  - Coordination of care.      Patient was seen and examined by me on 9/17/22,interim events noted,labs and radiology studies reviewed.  Aniceto Dorsey MD,FACC.  [address] [place], [place] [id number].  [phone]

## 2022-09-17 NOTE — PROGRESS NOTE ADULT - SUBJECTIVE AND OBJECTIVE BOX
[facility] Medicine  Patient is a 89y old  Female who presents with a chief complaint of Right Hip fracture (17 Sep 2022 08:29)      SUBJECTIVE / OVERNIGHT EVENTS:  No acute events over night.  Reports feeling well, continues to have minimal UOP from valle this AM. Denies any fevers/chills, headache, CP, SOB, abd pain, N/V/D, constipation, or leg swelling.       MEDICATIONS  (STANDING):  acetaminophen     Tablet .. 1000 milliGRAM(s) Oral every 8 hours  enoxaparin Injectable 30 milliGRAM(s) SubCutaneous every 24 hours  metoprolol tartrate 25 milliGRAM(s) Oral two times a day  polyethylene glycol 3350 17 Gram(s) Oral two times a day  senna 2 Tablet(s) Oral at bedtime  simvastatin 40 milliGRAM(s) Oral at bedtime  sodium chloride 0.9%. 1000 milliLiter(s) (100 mL/Hr) IV Continuous <Continuous>    MEDICATIONS  (PRN):  metoclopramide Injectable 10 milliGRAM(s) IV Push once PRN Nausea and/or Vomiting  ondansetron Injectable 4 milliGRAM(s) IV Push every 6 hours PRN Nausea and/or Vomiting  oxyCODONE    IR 5 milliGRAM(s) Oral every 4 hours PRN Severe Pain (7 - 10)  oxyCODONE    IR 2.5 milliGRAM(s) Oral every 4 hours PRN Moderate Pain (4 - 6)          OBJECTIVE:  Vital Signs Last 24 Hrs  T(C): 36.6 (17 Sep 2022 14:28), Max: 36.9 (16 Sep 2022 21:10)  T(F): 97.9 (17 Sep 2022 14:28), Max: 98.5 (16 Sep 2022 21:10)  HR: 90 (17 Sep 2022 14:28) (90 - 99)  BP: 101/52 (17 Sep 2022 14:28) (101/52 - 122/46)  BP(mean): --  RR: 18 (17 Sep 2022 14:28) (16 - 18)  SpO2: 96% (17 Sep 2022 14:28) (96% - 99%)    Parameters below as of 17 Sep 2022 14:28  Patient On (Oxygen Delivery Method): room air      PHYSICAL EXAM:  PHYSICAL EXAM:  GENERAL: NAD  HEENT: Normocephalic;  conjunctivae and sclerae clear; moist mucous membranes;   NECK : supple  CHEST/LUNG: Clear to auscultation bilaterally with good air entry   HEART: S1 S2  regular; no murmurs, gallops or rubs  ABDOMEN: Soft, Nontender, Nondistended; Bowel sounds present. valle catheter with <100cc dark orange urine noted.   EXTREMITIES: no cyanosis; no edema; no calf tenderness. R Hip Dressing is C/D/I.   SKIN: warm and dry; no rash  NERVOUS SYSTEM:  Awake and alert ; no new deficits    CAPILLARY BLOOD GLUCOSE        I&O's Summary    16 Sep 2022 07:01  -  17 Sep 2022 07:00  --------------------------------------------------------  IN: 0 mL / OUT: 350 mL / NET: -350 mL    17 Sep 2022 07:01  -  17 Sep 2022 17:06  --------------------------------------------------------  IN: 0 mL / OUT: 200 mL / NET: -200 mL              LABS:                        7.7    9.46  )-----------( 122      ( 17 Sep 2022 06:30 )             24.0     09-17    137  |  103  |  56<H>  ----------------------------<  107<H>  4.1   |  21<L>  |  3.00<H>    Ca    7.1<L>      17 Sep 2022 06:30        CARDIAC MARKERS ( 17 Sep 2022 06:30 )  x     / x     / 762 U/L / x     / x                  RADIOLOGY & ADDITIONAL TESTS:

## 2022-09-17 NOTE — PROGRESS NOTE ADULT - PROBLEM SELECTOR PLAN 8
-pending h/h and creatinine improvement with urine output, nephrology consulted.   D/c to [facility], family requesting [place]

## 2022-09-17 NOTE — PROGRESS NOTE ADULT - SUBJECTIVE AND OBJECTIVE BOX
PATIENT SEEN AND EXAMINED ON :- 9/17/22  DATE OF SERVICE:  9/17/22           Interim events noted,Labs ,Radiological studies and Cardiology tests reviewed .       HOSPITAL COURSE: HPI:  Patient is a 89 yo Citizen of [place] speaking female (accompanied by her son Isac, who provides translation) with PMH HTN, anemia requiring Iron transfusions (last one before COVID), HLD BIBEMS to the ED several hours after a witnessed fall. According to patient, she tripped and fell backwards in her kitchen and home health aide caught the patient's back before it hit the ground patient landed on her R buttock but without striking her head. Patient denies any dizziness, weakness, visual or neurological symptoms before falling. Patient reports 8/10 sharp, throbbing pain on the right hip. Pain is non radiating and worsened with movement but made better with the morphine. Patient denies any syncope. Pt denies any CP, SOB, abdominal pain, nausea vomiting or changes in bowel habits. Patient denies fevers or chills. At baseline, patient can perform ADLs, walks with no devices, and can walk on a flat surface and incline with no symptoms.   (13 Sep 2022 17:27)      INTERIM EVENTS:Patient seen at bedside ,interim events noted.      PMH -reviewed admission note, no change since admission  HEART FAILURE: Acute[ ]Chronic[ ] Systolic[ ] Diastolic[ ] Combined Systolic and Diastolic[ ]  CAD[ ] CABG[ ] PCI[ ]  DEVICES[ ] PPM[ ] ICD[ ] ILR[ ]  ATRIAL FIBRILLATION[ ] Paroxysmal[ ] Permanent[ ] CHADS2-[  ]  RAMSEY[ ] CKD1[ ] CKD2[ ] CKD3[ ] CKD4[ ] ESRD[ ]  COPD[ ] HTN[ ]   DM[ ] Type1[ ] Type 2[ ]   CVA[ ] Paresis[ ]    AMBULATION: Assisted[ ] Cane/walker[ ] Independent[ ]    MEDICATIONS  (STANDING):  acetaminophen     Tablet .. 1000 milliGRAM(s) Oral every 8 hours  enoxaparin Injectable 30 milliGRAM(s) SubCutaneous every 24 hours  metoprolol tartrate 25 milliGRAM(s) Oral two times a day  polyethylene glycol 3350 17 Gram(s) Oral two times a day  senna 2 Tablet(s) Oral at bedtime  simvastatin 40 milliGRAM(s) Oral at bedtime    MEDICATIONS  (PRN):  metoclopramide Injectable 10 milliGRAM(s) IV Push once PRN Nausea and/or Vomiting  ondansetron Injectable 4 milliGRAM(s) IV Push every 6 hours PRN Nausea and/or Vomiting  oxyCODONE    IR 2.5 milliGRAM(s) Oral every 4 hours PRN Moderate Pain (4 - 6)  oxyCODONE    IR 5 milliGRAM(s) Oral every 4 hours PRN Severe Pain (7 - 10)            REVIEW OF SYSTEMS:  Constitutional: [ ] fever, [ ]weight loss,  [ ]fatigue [ ]weight gain  Eyes: [ ] visual changes  Respiratory: [ ]shortness of breath;  [ ] cough, [ ]wheezing, [ ]chills, [ ]hemoptysis  Cardiovascular: [ ] chest pain, [ ]palpitations, [ ]dizziness,  [ ]leg swelling[ ]orthopnea[ ]PND  Gastrointestinal: [ ] abdominal pain, [ ]nausea, [ ]vomiting,  [ ]diarrhea [ ]Constipation [ ]Melena  Genitourinary: [ ] dysuria, [ ] hematuria [ ]Guardado  Neurologic: [ ] headaches [ ] tremors[ ]weakness [ ]Paralysis Right[ ] Left[ ]  Skin: [ ] itching, [ ]burning, [ ] rashes  Endocrine: [ ] heat or cold intolerance  Musculoskeletal: [ ] joint pain or swelling; [ ] muscle, back, or extremity pain  Psychiatric: [ ] depression, [ ]anxiety, [ ]mood swings, or [ ]difficulty sleeping  Hematologic: [ ] easy bruising, [ ] bleeding gums    [ ] All remaining systems negative except as per above.   [ ]Unable to obtain.  [x] No change in ROS since admission      Vital Signs Last 24 Hrs  T(C): 36.3 (17 Sep 2022 20:52), Max: 36.8 (17 Sep 2022 05:45)  T(F): 97.3 (17 Sep 2022 20:52), Max: 98.3 (17 Sep 2022 05:45)  HR: 92 (17 Sep 2022 20:52) (90 - 96)  BP: 125/65 (17 Sep 2022 20:52) (101/52 - 125/65)  BP(mean): --  RR: 18 (17 Sep 2022 20:52) (18 - 18)  SpO2: 96% (17 Sep 2022 20:52) (96% - 97%)    Parameters below as of 17 Sep 2022 20:52  Patient On (Oxygen Delivery Method): room air      I&O's Summary    16 Sep 2022 07:01  -  17 Sep 2022 07:00  --------------------------------------------------------  IN: 0 mL / OUT: 350 mL / NET: -350 mL    17 Sep 2022 07:01  -  17 Sep 2022 22:57  --------------------------------------------------------  IN: 0 mL / OUT: 750 mL / NET: -750 mL        PHYSICAL EXAM:  General: No acute distress BMI-  HEENT: EOMI, PERRL  Neck: Supple, [ ] JVD  Lungs: Equal air entry bilaterally; [ ] rales [ ] wheezing [ ] rhonchi  Heart: Regular rate and rhythm; [x ] murmur   2/6 [ x] systolic [ ] diastolic [ ] radiation[ ] rubs [ ]  gallops  Abdomen: Nontender, bowel sounds present  Extremities: No clubbing, cyanosis, [ ] edema [ ]Pulses  equal and intact  Nervous system:  Alert & Oriented X3, no focal deficits  Psychiatric: Normal affect  Skin: No rashes or lesions    LABS:  09-17    136  |  106  |  52<H>  ----------------------------<  120<H>  4.3   |  21<L>  |  2.95<H>    Ca    6.8<L>      17 Sep 2022 18:00      Creatinine Trend: 2.95<--, 3.00<--, 2.59<--, 2.37<--, 1.77<--, 1.31<--                        8.8    10.03 )-----------( 136      ( 17 Sep 2022 18:00 )             26.4

## 2022-09-17 NOTE — PROGRESS NOTE ADULT - SUBJECTIVE AND OBJECTIVE BOX
89yFemale    Diagnosis:  S/p Right Hip IM Nailing POD#2    Patient was seen and evaluated at bedside. Patient with no acute complaints.   Pain is  well controlled.   Patient was transfused 1 unit pRBC yesterday with no complications   Denies CP/SOB, dyspnea, paresthesias, N/V/D, palpitations.     Vital Signs Last 24 Hrs  T(C): 36.8 (17 Sep 2022 05:45), Max: 37 (16 Sep 2022 13:09)  T(F): 98.3 (17 Sep 2022 05:45), Max: 98.6 (16 Sep 2022 13:09)  HR: 94 (17 Sep 2022 05:45) (90 - 99)  BP: 121/48 (17 Sep 2022 05:45) (102/60 - 127/55)  BP(mean): --  RR: 18 (17 Sep 2022 05:45) (16 - 18)  SpO2: 96% (17 Sep 2022 05:45) (96% - 100%)    Parameters below as of 16 Sep 2022 21:10  Patient On (Oxygen Delivery Method): nasal cannula  O2 Flow (L/min): 2    I&O's Detail    16 Sep 2022 07:01  -  17 Sep 2022 07:00  --------------------------------------------------------  IN:  Total IN: 0 mL    OUT:    Indwelling Catheter - Urethral (mL): 350 mL  Total OUT: 350 mL    Total NET: -350 mL          Physical Exam:    General: AAOx3, NAD, resting comfortably in bed.    Right Hip:  Dressing is C/D/I. Skin is pink and warm. No erythema. SILT.  Wound with no drainage, healing well.   Lower extremity:  No calf tenderness, calves are soft. 2+pulses. NVI. 5/5 Strength of EHL/TA/gastrocnemius B/L.  Good capillary refill. Warm, well-perfused.                           7.7    9.46  )-----------( 122      ( 17 Sep 2022 06:30 )             24.0     09-17    137  |  103  |  56<H>  ----------------------------<  107<H>  4.1   |  21<L>  |  3.00<H>    Ca    7.1<L>      17 Sep 2022 06:30        Impression:  89yFemale S/p Right Hip IM Nailing POD#2  Plan:  -  Pain management  -  DVT prophylaxis with lovenox and venodynes   -  Daily Physical Therapy  WBAT of the Right lower extremity with walker   -  Discharge planning:. Rehab when medically stable  -  Transfuse 1-2 units of pRBC for acute post-op blood loss anemia   -  Guardado care as per medicine  -  Nephro consult noted  -  Case d/w DR. Encinas

## 2022-09-17 NOTE — PROGRESS NOTE ADULT - ASSESSMENT
89 y/o female with PMHx of HTN, HLD presented with right hip pain, sp fall backwards at home. In ED:  Xray showed right displaced hip fracture, CXR with clear lungs. and Creatinine  1.3 (similar to creatinine from 2 months ago). Found to be positive for COVID:  asymptomatic-- prior COVID positive test in July. patient admitted for right intertrochanteric femoral fracture with CT pelvis confirmed for Comminuted right intratrochanteric fracture as detailed above. Moderate right adductor muscle hematoma. s/p OR 9/15 POD #1 R hip IM nailing. hospital course complicated by decreased urine output with no urine noted on bladder scan. bolus given and Guardado catheter inserted with minimal output. noted with uptrending creatinine. Nephro Dr. cannon consulted.   drop in H/h, 1 unit PRBC ordered.

## 2022-09-17 NOTE — PROGRESS NOTE ADULT - ASSESSMENT
89 y/o female with PMHx of HTN, HLD presented with right hip pain, sp fall  backwards at home. Admit for R hip fracture and RAMSEY on CKD, anemia.      #Right hip fracture s/p IM nailing 9/15  #RAMSEY on Stage 3 CKD - likely ATN in setting of recent surgery  #Rt adductor muscle hematoma   #Acute blood loss anemia, Chronic Iron deficiency anemia   #HTN   #HLD  -Worsenign Cr and minimal UOP -f /u  Nephrology consult, continue IVF  - f/u C3, C4 to r/o atheroemboli  - f/u CK  - Renal US - bilateral renal cyst  -Patient tolerated procedure well  -Pain management following, cont with bowel regimen  -Monitor leukocytosis, no active s/s of infection, send sepsis w/u if persistent leukocytosis   - PT - [facility]  - Downtredning Hgb- 1U pRBC   -Incentive spirometry  -Cont BB with holding parameter   -DVT ppx.

## 2022-09-18 LAB
ANION GAP SERPL CALC-SCNC: 12 MMOL/L — SIGNIFICANT CHANGE UP (ref 5–17)
BUN SERPL-MCNC: 59 MG/DL — HIGH (ref 7–18)
CALCIUM SERPL-MCNC: 7.3 MG/DL — LOW (ref 8.4–10.5)
CHLORIDE SERPL-SCNC: 105 MMOL/L — SIGNIFICANT CHANGE UP (ref 96–108)
CO2 SERPL-SCNC: 22 MMOL/L — SIGNIFICANT CHANGE UP (ref 22–31)
CREAT SERPL-MCNC: 2.63 MG/DL — HIGH (ref 0.5–1.3)
EGFR: 17 ML/MIN/1.73M2 — LOW
GLUCOSE SERPL-MCNC: 125 MG/DL — HIGH (ref 70–99)
HCT VFR BLD CALC: 27.6 % — LOW (ref 34.5–45)
HGB BLD-MCNC: 9.1 G/DL — LOW (ref 11.5–15.5)
MCHC RBC-ENTMCNC: 31.6 PG — SIGNIFICANT CHANGE UP (ref 27–34)
MCHC RBC-ENTMCNC: 33 GM/DL — SIGNIFICANT CHANGE UP (ref 32–36)
MCV RBC AUTO: 95.8 FL — SIGNIFICANT CHANGE UP (ref 80–100)
NRBC # BLD: 0 /100 WBCS — SIGNIFICANT CHANGE UP (ref 0–0)
PLATELET # BLD AUTO: 162 K/UL — SIGNIFICANT CHANGE UP (ref 150–400)
POTASSIUM SERPL-MCNC: 3.7 MMOL/L — SIGNIFICANT CHANGE UP (ref 3.5–5.3)
POTASSIUM SERPL-SCNC: 3.7 MMOL/L — SIGNIFICANT CHANGE UP (ref 3.5–5.3)
RBC # BLD: 2.88 M/UL — LOW (ref 3.8–5.2)
RBC # FLD: 16.5 % — HIGH (ref 10.3–14.5)
SARS-COV-2 RNA SPEC QL NAA+PROBE: DETECTED
SODIUM SERPL-SCNC: 139 MMOL/L — SIGNIFICANT CHANGE UP (ref 135–145)
WBC # BLD: 10.63 K/UL — HIGH (ref 3.8–10.5)
WBC # FLD AUTO: 10.63 K/UL — HIGH (ref 3.8–10.5)

## 2022-09-18 PROCEDURE — 99233 SBSQ HOSP IP/OBS HIGH 50: CPT

## 2022-09-18 RX ORDER — FERROUS SULFATE 325(65) MG
325 TABLET ORAL DAILY
Refills: 0 | Status: DISCONTINUED | OUTPATIENT
Start: 2022-09-18 | End: 2022-09-20

## 2022-09-18 RX ADMIN — Medication 1000 MILLIGRAM(S): at 05:48

## 2022-09-18 RX ADMIN — SIMVASTATIN 40 MILLIGRAM(S): 20 TABLET, FILM COATED ORAL at 22:05

## 2022-09-18 RX ADMIN — OXYCODONE HYDROCHLORIDE 5 MILLIGRAM(S): 5 TABLET ORAL at 10:08

## 2022-09-18 RX ADMIN — SENNA PLUS 2 TABLET(S): 8.6 TABLET ORAL at 22:05

## 2022-09-18 RX ADMIN — OXYCODONE HYDROCHLORIDE 5 MILLIGRAM(S): 5 TABLET ORAL at 22:15

## 2022-09-18 RX ADMIN — POLYETHYLENE GLYCOL 3350 17 GRAM(S): 17 POWDER, FOR SOLUTION ORAL at 18:48

## 2022-09-18 RX ADMIN — OXYCODONE HYDROCHLORIDE 5 MILLIGRAM(S): 5 TABLET ORAL at 22:30

## 2022-09-18 RX ADMIN — POLYETHYLENE GLYCOL 3350 17 GRAM(S): 17 POWDER, FOR SOLUTION ORAL at 05:48

## 2022-09-18 RX ADMIN — ENOXAPARIN SODIUM 30 MILLIGRAM(S): 100 INJECTION SUBCUTANEOUS at 00:19

## 2022-09-18 RX ADMIN — Medication 25 MILLIGRAM(S): at 05:48

## 2022-09-18 RX ADMIN — Medication 1000 MILLIGRAM(S): at 14:40

## 2022-09-18 RX ADMIN — Medication 1000 MILLIGRAM(S): at 06:32

## 2022-09-18 RX ADMIN — Medication 25 MILLIGRAM(S): at 18:48

## 2022-09-18 NOTE — PROGRESS NOTE ADULT - SUBJECTIVE AND OBJECTIVE BOX
89yFemale    Diagnosis:  S/p Right Hip IM Nailing POD#3    Patient was seen and evaluated at bedside. Patient with no acute complaints.    :[id number]  Pain is  well controlled.   Patient was transfused 1 unit pRBC 9/17&9/16 with no complications   Denies CP/SOB, dyspnea, paresthesias, N/V/D, palpitations.     Vital Signs Last 24 Hrs  T(C): 36.9 (09-18-22 @ 05:46), Max: 36.9 (09-18-22 @ 05:46)  T(F): 98.4 (09-18-22 @ 05:46), Max: 98.4 (09-18-22 @ 05:46)  HR: 96 (09-18-22 @ 05:46) (90 - 96)  BP: 138/49 (09-18-22 @ 05:46) (101/52 - 138/49)  BP(mean): 70 (09-18-22 @ 05:46) (70 - 70)  RR: 18 (09-18-22 @ 05:46) (18 - 18)  SpO2: 97% (09-18-22 @ 05:46) (96% - 97%)    I&O's Detail    17 Sep 2022 07:01  -  18 Sep 2022 07:00  --------------------------------------------------------  IN:  Total IN: 0 mL    OUT:    Indwelling Catheter - Urethral (mL): 751 mL  Total OUT: 751 mL    Total NET: -751 mL            Physical Exam:    General: AAOx3, NAD, resting comfortably in bed.    Right Hip:  Dressing is C/D/I. dressing changed today. Skin is pink and warm. No erythema. SILT.  Wound with no drainage, healing well.   Lower extremity:  No calf tenderness, calves are soft. 2+pulses. NVI. 5/5 Strength of EHL/TA/gastrocnemius B/L.  Good capillary refill. Warm, well-perfused.                                      9.1    10.63 )-----------( 162      ( 18 Sep 2022 05:51 )             27.6   09-18    139  |  105  |  59<H>  ----------------------------<  125<H>  3.7   |  22  |  2.63<H>    Ca    7.3<L>      18 Sep 2022 05:51          Impression:  89yFemale S/p Right Hip IM Nailing POD#3  Plan:  -  Pain management  -  DVT prophylaxis with lovenox and venodynes   -  Daily Physical Therapy  WBAT of the Right lower extremity with walker   -  Discharge planning:. Rehab when medically stable  -  Guardado care as per medicine  -  Nephro consult noted  -  Case d/w DR. Encinas

## 2022-09-18 NOTE — PROGRESS NOTE ADULT - ASSESSMENT
89 y/o female with PMHx of HTN, HLD presented with right hip pain, sp fall  backwards at home. Admit for R hip fracture and RAMSEY on CKD, anemia.      #Right hip fracture s/p IM nailing 9/15  #RAMSEY on Stage 3 CKD - likely ATN in setting of recent surgery  #Rt adductor muscle hematoma   #Acute blood loss anemia, Chronic Iron deficiency anemia   #HTN   #HLD  -Improving Cr and UOP - f/u Nephrology - likely ATN  - f/u C3, C4 to r/o atheroemboli  - f/u CK  - Renal US - bilateral renal cyst  - Start ferrous sulfate 325mg PO BID  -Patient tolerated procedure well  -Pain management following, cont with bowel regimen  -Monitor leukocytosis, no active s/s of infection, send sepsis w/u if persistent leukocytosis   - PT - LYNN  - Downtredning Hgb- 1U pRBC   -Incentive spirometry  -Cont BB with holding parameter   -DVT ppx.  89 y/o female with PMHx of HTN, HLD presented with right hip pain, sp fall  backwards at home. Admit for R hip fracture and RAMSEY on CKD, anemia.      #Right hip fracture s/p IM nailing 9/15  #RAMSEY on Stage 3 CKD - likely ATN in setting of recent surgery  #Rt adductor muscle hematoma   #Acute blood loss anemia, Chronic Iron deficiency anemia   #HTN   #HLD  -Improving Cr and UOP - f/u Nephrology - likely ATN  - f/u C3, C4 to r/o atheroemboli  - f/u CK  - Renal US - bilateral renal cyst  - Start ferrous sulfate 325mg PO  -Patient tolerated procedure well  -Pain management following, cont with bowel regimen  -Monitor leukocytosis, no active s/s of infection, send sepsis w/u if persistent leukocytosis   - PT - LYNN  - Downtredning Hgb- 1U pRBC   -Incentive spirometry  -Cont BB with holding parameter   -DVT ppx.

## 2022-09-18 NOTE — PROGRESS NOTE ADULT - SUBJECTIVE AND OBJECTIVE BOX
[facility] NEPHROLOGY- PROGRESS NOTE    Patient is a 89 yo Martiniquais speaking female with h/o HTN, iron def anemia, HLD  presents with rt hip pain s/p fall. Found to have right intratrochanteric fracture s/p OR 9/15 with insertion of locking intramedullary lupe into right hip. Hospital course complicated by RAMSEY. Nephrology consulted for Elevated serum creatinine.    Hospital Medications: Medications reviewed.  REVIEW OF SYSTEMS:  CONSTITUTIONAL: No fevers or chills  RESPIRATORY: No shortness of breath  CARDIOVASCULAR: No chest pain.  GASTROINTESTINAL: No nausea, vomiting, diarrhea No abdominal pain.   VASCULAR: No bilateral lower extremity edema.  +rt hip /  Rt knee pain, b/l heel pain    VITALS:  T(F): 98 (22 @ 13:10), Max: 98.4 (22 @ 05:46)  HR: 86 (22 @ 13:10)  BP: 147/70 (22 @ 13:10)  RR: 18 (22 @ 13:10)  SpO2: 97% (22 @ 13:10)  Wt(kg): --     @ 07:01  -   @ 07:00  --------------------------------------------------------  IN: 0 mL / OUT: 751 mL / NET: -751 mL      PHYSICAL EXAM:  Gen: NAD, calm  HEENT: anicteric   Neck: no JVD  Cards: RRR, +S1/S2, no M/G/R  Resp: CTA B/L  GI: soft, NT/ND, NABS  : +valle   Extremities: no LE edema B/L but very tender b/l  Rt hip bandage    LABS:      139  |  105  |  59<H>  ----------------------------<  125<H>  3.7   |  22  |  2.63<H>    Ca    7.3<L>      18 Sep 2022 05:51      Creatinine Trend: 2.63 <--, 2.95 <--, 3.00 <--, 2.59 <--, 2.37 <--, 1.77 <--, 1.31 <--, 1.25 <--, 1.31 <--                        9.1    10.63 )-----------( 162      ( 18 Sep 2022 05:51 )             27.6     Urine Studies:  Urinalysis Basic - ( 13 Sep 2022 21:20 )    Color: Yellow / Appearance: Clear / S.020 / pH:   Gluc:  / Ketone: Trace  / Bili: Negative / Urobili: Negative   Blood:  / Protein: 30 mg/dL / Nitrite: Negative   Leuk Esterase: Negative / RBC: 5-10 /HPF / WBC 0-2 /HPF   Sq Epi:  / Non Sq Epi: Few /HPF / Bacteria: Few /HPF      Sodium, Random Urine: 39 mmol/L ( @ 10:40)  Chloride, Random Urine: 30 mmol/L ( @ 10:40)  Creatinine, Random Urine: 84 mg/dL ( @ 10:40)

## 2022-09-18 NOTE — PROGRESS NOTE ADULT - SUBJECTIVE AND OBJECTIVE BOX
[facility] Medicine  Patient is a 89y old  Female who presents with a chief complaint of Right Hip fracture (17 Sep 2022 08:29)      SUBJECTIVE / OVERNIGHT EVENTS:  No acute events over night.  Reports feeling well but some hip pain. Patient now with some urine output in valle bag.  Denies any fevers/chills, headache, CP, SOB, abd pain, N/V/D, constipation, or leg swelling.       MEDICATIONS  (STANDING):  acetaminophen     Tablet .. 1000 milliGRAM(s) Oral every 8 hours  enoxaparin Injectable 30 milliGRAM(s) SubCutaneous every 24 hours  metoprolol tartrate 25 milliGRAM(s) Oral two times a day  polyethylene glycol 3350 17 Gram(s) Oral two times a day  senna 2 Tablet(s) Oral at bedtime  simvastatin 40 milliGRAM(s) Oral at bedtime  sodium chloride 0.9%. 1000 milliLiter(s) (100 mL/Hr) IV Continuous <Continuous>    MEDICATIONS  (PRN):  metoclopramide Injectable 10 milliGRAM(s) IV Push once PRN Nausea and/or Vomiting  ondansetron Injectable 4 milliGRAM(s) IV Push every 6 hours PRN Nausea and/or Vomiting  oxyCODONE    IR 5 milliGRAM(s) Oral every 4 hours PRN Severe Pain (7 - 10)  oxyCODONE    IR 2.5 milliGRAM(s) Oral every 4 hours PRN Moderate Pain (4 - 6)          OBJECTIVE:  Vital Signs Last 24 Hrs  T(C): 36.7 (18 Sep 2022 13:10), Max: 36.9 (18 Sep 2022 05:46)  T(F): 98 (18 Sep 2022 13:10), Max: 98.4 (18 Sep 2022 05:46)  HR: 86 (18 Sep 2022 13:10) (86 - 100)  BP: 147/70 (18 Sep 2022 13:10) (125/65 - 159/55)  BP(mean): 70 (18 Sep 2022 05:46) (70 - 70)  RR: 18 (18 Sep 2022 13:10) (18 - 18)  SpO2: 97% (18 Sep 2022 13:10) (96% - 98%)    Parameters below as of 18 Sep 2022 13:10  Patient On (Oxygen Delivery Method): room air          PHYSICAL EXAM:  PHYSICAL EXAM:  GENERAL: NAD  HEENT: Normocephalic;  conjunctivae and sclerae clear; moist mucous membranes;   NECK : supple  CHEST/LUNG: Clear to auscultation bilaterally with good air entry   HEART: S1 S2  regular; no murmurs, gallops or rubs  ABDOMEN: Soft, Nontender, Nondistended; Bowel sounds present. valle catheter with <100cc dark orange urine noted.   EXTREMITIES: no cyanosis; no edema; no calf tenderness. R Hip Dressing is C/D/I.   SKIN: warm and dry; no rash  NERVOUS SYSTEM:  Awake and alert ; no new deficits    CAPILLARY BLOOD GLUCOSE        I&O's Summary    16 Sep 2022 07:01  -  17 Sep 2022 07:00  --------------------------------------------------------  IN: 0 mL / OUT: 350 mL / NET: -350 mL    17 Sep 2022 07:01  -  17 Sep 2022 17:06  --------------------------------------------------------  IN: 0 mL / OUT: 200 mL / NET: -200 mL              LABS:                             9.1    10.63 )-----------( 162      ( 18 Sep 2022 05:51 )             27.6   09-18    139  |  105  |  59<H>  ----------------------------<  125<H>  3.7   |  22  |  2.63<H>    Ca    7.3<L>      18 Sep 2022 05:51          CARDIAC MARKERS ( 17 Sep 2022 06:30 )  x     / x     / 762 U/L / x     / x                  RADIOLOGY & ADDITIONAL TESTS:

## 2022-09-18 NOTE — PROGRESS NOTE ADULT - ASSESSMENT
Patient is a 89 yo Cambodian speaking female with h/o HTN, iron def anemia, HLD  presents with rt hip pain s/p fall. Found to have right intratrochanteric fracture s/p OR 9/15 with insertion of locking intramedullary lupe into right hip. Hospital course complicated by RAMSEY. Nephrology consulted for Elevated serum creatinine.    1. RAMSEY- unknown baseline SCr; sadie SCr 1.2-1.3. RAMSEY likely hemodynamically mediated/ ATN. UA with 30 protein and mod blood; defer secondary w/u due to advanced age. FeNa 1.02%- inconclusive. Renal function now plateaued and slowly improving. Continue to monitor off IVF. Bladder scan 9/15 neg for retention. C3 and C4 wnl; no signs of atheroemboli. - should not cause RAMSEY. Renal US with no hydro b/l. Strict I/Os. Avoid nephrotoxins/ NSAIDs/ RCA. Monitor BMP.  2. Essential HTN- BP low normal. Pt on Metoprolol 25mg PO bid. Avoid hypotension. Rate control as per primary team.   3. Rt hip fracture-  s/p OR 9/15 with insertion of locking intramedullary lupe into right hip. Ortho following. Pain control. Please avoid NSAIDS  4. Anemia- post op anemia. Pt for 1 unit prbc today. tsat 7% with Ferritin 232- recc Ferrous sulfate 325mg PO bid. Monitor h/h. Transfuse prbc prn.       [facility] NEPHROLOGY  Sai Moreland M.D.  Matt Mcguire D.O.  Lo Tucker M.D.  Charlette Hobbs, MSN, ANP-C  [phone]    [address] [place] [id number]

## 2022-09-19 LAB
ANION GAP SERPL CALC-SCNC: 8 MMOL/L — SIGNIFICANT CHANGE UP (ref 5–17)
BUN SERPL-MCNC: 57 MG/DL — HIGH (ref 7–18)
CALCIUM SERPL-MCNC: 8 MG/DL — LOW (ref 8.4–10.5)
CHLORIDE SERPL-SCNC: 109 MMOL/L — HIGH (ref 96–108)
CO2 SERPL-SCNC: 23 MMOL/L — SIGNIFICANT CHANGE UP (ref 22–31)
CREAT SERPL-MCNC: 1.96 MG/DL — HIGH (ref 0.5–1.3)
EGFR: 24 ML/MIN/1.73M2 — LOW
GLUCOSE SERPL-MCNC: 120 MG/DL — HIGH (ref 70–99)
HCT VFR BLD CALC: 30.5 % — LOW (ref 34.5–45)
HGB BLD-MCNC: 9.6 G/DL — LOW (ref 11.5–15.5)
MCHC RBC-ENTMCNC: 31 PG — SIGNIFICANT CHANGE UP (ref 27–34)
MCHC RBC-ENTMCNC: 31.5 GM/DL — LOW (ref 32–36)
MCV RBC AUTO: 98.4 FL — SIGNIFICANT CHANGE UP (ref 80–100)
NRBC # BLD: 0 /100 WBCS — SIGNIFICANT CHANGE UP (ref 0–0)
PLATELET # BLD AUTO: 217 K/UL — SIGNIFICANT CHANGE UP (ref 150–400)
POTASSIUM SERPL-MCNC: 4.5 MMOL/L — SIGNIFICANT CHANGE UP (ref 3.5–5.3)
POTASSIUM SERPL-SCNC: 4.5 MMOL/L — SIGNIFICANT CHANGE UP (ref 3.5–5.3)
RBC # BLD: 3.1 M/UL — LOW (ref 3.8–5.2)
RBC # FLD: 16 % — HIGH (ref 10.3–14.5)
SODIUM SERPL-SCNC: 140 MMOL/L — SIGNIFICANT CHANGE UP (ref 135–145)
WBC # BLD: 8.49 K/UL — SIGNIFICANT CHANGE UP (ref 3.8–10.5)
WBC # FLD AUTO: 8.49 K/UL — SIGNIFICANT CHANGE UP (ref 3.8–10.5)

## 2022-09-19 PROCEDURE — 99233 SBSQ HOSP IP/OBS HIGH 50: CPT

## 2022-09-19 RX ADMIN — POLYETHYLENE GLYCOL 3350 17 GRAM(S): 17 POWDER, FOR SOLUTION ORAL at 17:58

## 2022-09-19 RX ADMIN — OXYCODONE HYDROCHLORIDE 5 MILLIGRAM(S): 5 TABLET ORAL at 05:14

## 2022-09-19 RX ADMIN — Medication 325 MILLIGRAM(S): at 12:57

## 2022-09-19 RX ADMIN — OXYCODONE HYDROCHLORIDE 5 MILLIGRAM(S): 5 TABLET ORAL at 20:41

## 2022-09-19 RX ADMIN — OXYCODONE HYDROCHLORIDE 5 MILLIGRAM(S): 5 TABLET ORAL at 06:48

## 2022-09-19 RX ADMIN — Medication 25 MILLIGRAM(S): at 17:57

## 2022-09-19 RX ADMIN — Medication 25 MILLIGRAM(S): at 05:14

## 2022-09-19 RX ADMIN — OXYCODONE HYDROCHLORIDE 5 MILLIGRAM(S): 5 TABLET ORAL at 22:02

## 2022-09-19 RX ADMIN — POLYETHYLENE GLYCOL 3350 17 GRAM(S): 17 POWDER, FOR SOLUTION ORAL at 05:14

## 2022-09-19 RX ADMIN — SENNA PLUS 2 TABLET(S): 8.6 TABLET ORAL at 21:16

## 2022-09-19 RX ADMIN — SIMVASTATIN 40 MILLIGRAM(S): 20 TABLET, FILM COATED ORAL at 22:38

## 2022-09-19 RX ADMIN — ENOXAPARIN SODIUM 30 MILLIGRAM(S): 100 INJECTION SUBCUTANEOUS at 00:30

## 2022-09-19 NOTE — PROGRESS NOTE ADULT - SUBJECTIVE AND OBJECTIVE BOX
[facility] NEPHROLOGY- PROGRESS NOTE    Patient is a 89 yo Micronesian speaking female with h/o HTN, iron def anemia, HLD  presents with rt hip pain s/p fall. Found to have right intratrochanteric fracture s/p OR 9/15 with insertion of locking intramedullary lupe into right hip. Hospital course complicated by RAMSEY. Nephrology consulted for Elevated serum creatinine.    Hospital Medications: Medications reviewed.  REVIEW OF SYSTEMS:  CONSTITUTIONAL: No fevers or chills  RESPIRATORY: No shortness of breath  CARDIOVASCULAR: No chest pain.  GASTROINTESTINAL: No nausea, vomiting, diarrhea No abdominal pain. +constipated  VASCULAR: No bilateral lower extremity edema.  +rt hip /  Rt knee pain, b/l heel pain    VITALS:  T(F): 98 (22 @ 14:08), Max: 98.4 (22 @ 21:31)  HR: 98 (22 @ 14:08)  BP: 125/63 (22 @ 14:08)  RR: 18 (22 @ 14:08)  SpO2: 99% (22 @ 14:08)  Wt(kg): --     @ 07:01  -   @ 07:00  --------------------------------------------------------  IN: 0 mL / OUT: 1000 mL / NET: -1000 mL      PHYSICAL EXAM:  Gen: NAD, calm  HEENT: anicteric   Neck: no JVD  Cards: RRR, +S1/S2, no M/G/R  Resp: CTA B/L  GI: soft, NT/ND, NABS  : +valle   Extremities: no LE edema B/L but very tender b/l  Rt hip bandage    LABS:      140  |  109<H>  |  57<H>  ----------------------------<  120<H>  4.5   |  23  |  1.96<H>    Ca    8.0<L>      19 Sep 2022 10:40      Creatinine Trend: 1.96 <--, 2.63 <--, 2.95 <--, 3.00 <--, 2.59 <--, 2.37 <--, 1.77 <--, 1.31 <--, 1.25 <--, 1.31 <--                        9.6    8.49  )-----------( 217      ( 19 Sep 2022 10:40 )             30.5     Urine Studies:  Urinalysis Basic - ( 13 Sep 2022 21:20 )    Color: Yellow / Appearance: Clear / S.020 / pH:   Gluc:  / Ketone: Trace  / Bili: Negative / Urobili: Negative   Blood:  / Protein: 30 mg/dL / Nitrite: Negative   Leuk Esterase: Negative / RBC: 5-10 /HPF / WBC 0-2 /HPF   Sq Epi:  / Non Sq Epi: Few /HPF / Bacteria: Few /HPF      Sodium, Random Urine: 39 mmol/L ( @ 10:40)  Chloride, Random Urine: 30 mmol/L ( @ 10:40)  Creatinine, Random Urine: 84 mg/dL ( @ 10:40)

## 2022-09-19 NOTE — PROGRESS NOTE ADULT - PROBLEM SELECTOR PLAN 2
-RAMSEY on baseline stage 3CKD   -renal US as above   -renal function improving   -Nephro Dr. Tucker   -avoid nephrotoxins  -d/c valle today

## 2022-09-19 NOTE — PROGRESS NOTE ADULT - ASSESSMENT
89 y/o female with PMHx of HTN, HLD presented with right hip pain, sp fall backwards at home. In ED:  Xray showed right displaced hip fracture, CXR with clear lungs. and Creatinine  1.3 (similar to creatinine from 2 months ago). Found to be positive for COVID:  asymptomatic-- prior COVID positive test in July. patient admitted for right intertrochanteric femoral fracture with CT pelvis confirmed for Comminuted right intratrochanteric fracture as detailed above. Moderate right adductor muscle hematoma. s/p OR 9/15 POD, R hip IM nailing. hospital course complicated by decreased urine output with no urine noted on bladder scan. bolus given and Guardado catheter inserted with minimal output. noted with up trending creatinine. Nephro Dr. cannon consulted. Also found to have low hg, s/p 1 PRBC.  Renal US with renal cysts and no acute findings, renal function slowly improving  PT recommended LYNN, pending insurance auth, CM following

## 2022-09-19 NOTE — PROGRESS NOTE ADULT - NS ATTEND AMEND GEN_ALL_CORE FT
89 y/o female with PMHx of HTN, HLD presented with right hip pain, sp fall  backwards at home. Admit for R hip fracture and RAMSEY on CKD, anemia.    #Right hip fracture s/p IM nailing 9/15  #RAMSEY on Stage 3 CKD - likely ATN in setting of recent surgery  #Rt adductor muscle hematoma   #Acute blood loss anemia, Chronic Iron deficiency anemia   #HTN   #HLD  - TOV today  -Improving Cr and UOP - f/u Nephrology - likely ATN - Now closer to baseline  - Renal US - bilateral renal cyst  - continue ferrous sulfate 325mg PO  -Patient tolerated procedure well  -Pain management following, cont with bowel regimen  -Monitor leukocytosis, no active s/s of infection, send sepsis w/u if persistent leukocytosis   - PT - LYNN  -Incentive spirometry  -Cont BB with holding parameter   -DVT ppx  - DC Pending LYNN Auth

## 2022-09-19 NOTE — PROGRESS NOTE ADULT - ASSESSMENT
Patient is a 89 yo Serbian speaking female with h/o HTN, iron def anemia, HLD  presents with rt hip pain s/p fall. Found to have right intratrochanteric fracture s/p OR 9/15 with insertion of locking intramedullary lupe into right hip. Hospital course complicated by RAMSEY. Nephrology consulted for Elevated serum creatinine.    1. RAMSEY- unknown baseline SCr; sadie SCr 1.2-1.3. RAMSEY likely hemodynamically mediated/ ATN. UA with 30 protein and mod blood; defer secondary w/u due to advanced age. FeNa 1.02%- inconclusive. Renal function now improving.    Continue to monitor off IVF. Bladder scan 9/15 neg for retention. C3 and C4 wnl; no signs of atheroemboli. - should not cause RAMSEY. Renal US with no hydro b/l. Strict I/Os. Avoid nephrotoxins/ NSAIDs/ RCA. Monitor BMP.  2. Essential HTN- BP acceptable.  Pt on Metoprolol 25mg PO bid. Avoid hypotension. Rate control as per primary team.   3. Rt hip fracture-  s/p OR 9/15 with insertion of locking intramedullary lupe into right hip. Ortho following. Pain control. Please avoid NSAIDS  4. Anemia- post op anemia. Hgb improving s/p 2 units prbc. tsat 7% with Ferritin 232- c/w Ferrous sulfate 325mg PO bid. Monitor h/h. Transfuse prbc prn.       [facility] NEPHROLOGY  Sai Moreland M.D.  Matt Mcguire D.O.  Lo Tucker M.D.  Charlette Hobbs, MITRA, ANP-C  [phone]    [address] [place] [id number]

## 2022-09-19 NOTE — PROGRESS NOTE ADULT - SUBJECTIVE AND OBJECTIVE BOX
89yFemale    Diagnosis:  S/p Right Hip IM Nailing POD#4    Patient was seen and evaluated at bedside. Patient with no acute complaints, pain at the knee.    :[id number]  Pain is  well controlled.   Patient was transfused 1 unit pRBC 9/17&9/16 with no complications   Denies CP/SOB, dyspnea, paresthesias, N/V/D, palpitations.     Vital Signs Last 24 Hrs  T(C): 36.8 (09-19-22 @ 05:30), Max: 36.9 (09-18-22 @ 21:31)  T(F): 98.3 (09-19-22 @ 05:30), Max: 98.4 (09-18-22 @ 21:31)  HR: 103 (09-19-22 @ 05:30) (86 - 103)  BP: 168/51 (09-19-22 @ 05:30) (139/45 - 168/51)  BP(mean): 82 (09-19-22 @ 05:30) (81 - 82)  RR: 18 (09-19-22 @ 05:30) (18 - 18)  SpO2: 99% (09-19-22 @ 05:30) (97% - 99%)      I&O's Detail    18 Sep 2022 07:01  -  19 Sep 2022 07:00  --------------------------------------------------------  IN:  Total IN: 0 mL    OUT:    Indwelling Catheter - Urethral (mL): 1000 mL  Total OUT: 1000 mL    Total NET: -1000 mL                  Physical Exam:    General: AAOx3, NAD, resting comfortably in bed.    Right Hip:  Dressing is C/D/I. some minimal staining of the most posterior middle dressing. evidence of tampering noted. changed with clean gauze and tegaderm. Skin is pink and warm. No erythema. SILT.  Wound with no drainage, healing well.   Lower extremity:  No calf tenderness, calves are soft. 2+pulses. NVI. 5/5 Strength of EHL/TA/gastrocnemius B/L.  Good capillary refill. Warm, well-perfused.                                      9.1    10.63 )-----------( 162      ( 18 Sep 2022 05:51 )             27.6   09-18    139  |  105  |  59<H>  ----------------------------<  125<H>  3.7   |  22  |  2.63<H>    Ca    7.3<L>      18 Sep 2022 05:51          Impression:  89yFemale S/p Right Hip IM Nailing POD#4  Plan:  -  f/u AM labs, computer issue at the lab   -  Pain management  -  DVT prophylaxis with lovenox and venodynes   -  Daily Physical Therapy  WBAT of the Right lower extremity with walker   -  Discharge planning:. Rehab when medically stable  -  Guardado care as per medicine  -  Nephro consult noted  -  Case d/w DR. Encinas

## 2022-09-19 NOTE — PROGRESS NOTE ADULT - PROBLEM SELECTOR PLAN 1
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 This is a chronic condition   Problem details: Patient reports a long history of neck stiffness >2 years ago without a mechanism of injury or change in daily routine. Patient reports seeing her MD for a check up and he recommended an episode of physical therapy due to her complaints of neck stiffness.   Site of Pain: no pain reported. Radiates to:  None. Associated symptoms:  Loss of motion/stiffness. Symptoms are exacerbated by rotating head, looking up or down and change of position and relieved by rest and reported as 0/10 on pain scale. General health as reported by patient is good.                          Red flags:  None as reported by patient.              Objective:  Standing Alignment:    Cervical/Thoracic:  Forward head and thoracic kyphosis increased  Shoulder/UE:  Rounded shoulders       Cervical/Thoracic Evaluation  AROM:  AROM Cervical:  Flexion:            35 deg  Extension:       25 deg  Rotation:         Left: 35 deg     Right: 40 deg  Side Bend:      Left: 25 deg     Right:  20 deg    Headaches: none  Cervical Myotomes:  Cervical myotomes: grossly 4+/5 bilaterally.  Cervical Dermatomes:  normal    Re: Nury Cárdenas   :   1944    Cervical Palpation:    Tenderness present at Left:    Upper Trap; Levator and Suboccipitals  Tenderness present at Right:    Upper Trap;  Levator and Suboccipitals    Cervical Stability/Joint Clearing:  normal  Spinal Segmental Conclusions:    Level:  Hypo at C4, C5, C6, C7, C1, C2, C3, T1, T2, T3, T4, T5, T6, T8, T7 and T9    Assessment/Plan:    Patient is a 75 year old female with cervical complaints.    Patient has the following significant findings with corresponding treatment plan.                Diagnosis 1:  Neck stiffness  Pain -  hot/cold therapy, manual therapy, self management, education and home program  Decreased ROM/flexibility - manual therapy, therapeutic exercise, therapeutic activity and home program  Decreased joint mobility - manual therapy, therapeutic exercise, therapeutic activity and home program  Decreased strength - therapeutic exercise, therapeutic activities and home program  Decreased function - therapeutic activities and home program  Impaired posture - neuro re-education, therapeutic activities and home program    Therapy Evaluation Codes:   1) History comprised of:   Personal factors that impact the plan of care:      None.    Comorbidity factors that impact the plan of care are:      Rheumatoid arthritis.     Medications impacting care: None.  2) Examination of Body Systems comprised of:   Body structures and functions that impact the plan of care:      Cervical spine.   Activity limitations that impact the plan of care are:      Sitting, Standing, Walking and Working.  3) Clinical presentation characteristics are:   Stable/Uncomplicated.  4) Decision-Making    Low complexity using standardized patient assessment instrument and/or measureable assessment of functional outcome.  Cumulative Therapy Evaluation is: Low complexity.    Previous and current functional limitations:  (See Goal Flow Sheet for this information)    Short term and Long term goals: (See Goal Flow Sheet for this information)     Communication ability:  Patient appears to be able to clearly communicate and understand verbal and written communication  and follow directions correctly.  Treatment Explanation - The following has been discussed with the patient:   RX ordered/plan of care  Anticipated outcomes  Possible risks and side effects  This patient would benefit from PT intervention to resume normal activities.   Rehab potential is good.  Re: Nury Cárdenas   :   1944    Frequency:  2 X a month, once daily  Duration:  for 2 months  Discharge Plan:  Achieve all LTG.  Independent in home treatment program.  Reach maximal therapeutic benefit.    Please refer to the daily flowsheet for treatment today, total treatment time and time spent performing 1:1 timed codes.       Thank you for your referral.    INQUIRIES  Therapist: TUCKER Ledbetter PT  [address]  [address]  [place] [address]  Phone: [phone]  Fax: [phone]      -s/p fall with right intratrochanteric fracture   -s/p IM nailing of right hip 9/15  -WBAT on RLE   -cont pain mgmt   -PT recommended LYNN   -Ortho following

## 2022-09-19 NOTE — PROGRESS NOTE ADULT - SUBJECTIVE AND OBJECTIVE BOX
Patient is a 89y old  Female who presents with a chief complaint of Right Hip fracture (19 Sep 2022 14:43)      INTERVAL HPI/OVERNIGHT EVENTS:        REVIEW OF SYSTEMS:  CONSTITUTIONAL: No fever, chills  ENMT:  No difficulty hearing, no change in vision  NECK: No pain or stiffness  RESPIRATORY: No cough, SOB  CARDIOVASCULAR: No chest pain, palpitations  GASTROINTESTINAL: No abdominal pain. No nausea, vomiting, or diarrhea  GENITOURINARY: No dysuria  NEUROLOGICAL: No HA  SKIN: No itching, burning, rashes, or lesions   LYMPH NODES: No enlarged glands  ENDOCRINE: No heat or cold intolerance; No hair loss  MUSCULOSKELETAL: No joint pain or swelling; No muscle, back, or extremity pain  PSYCHIATRIC: No depression, anxiety  HEME/LYMPH: No easy bruising, or bleeding gums    T(C): 36.7 (09-19-22 @ 14:08), Max: 36.9 (09-18-22 @ 21:31)  HR: 98 (09-19-22 @ 14:08) (98 - 103)  BP: 125/63 (09-19-22 @ 14:08) (125/63 - 168/51)  RR: 18 (09-19-22 @ 14:08) (18 - 18)  SpO2: 99% (09-19-22 @ 14:08) (99% - 100%)  Wt(kg): --Vital Signs Last 24 Hrs  T(C): 36.7 (19 Sep 2022 14:08), Max: 36.9 (18 Sep 2022 21:31)  T(F): 98 (19 Sep 2022 14:08), Max: 98.4 (18 Sep 2022 21:31)  HR: 98 (19 Sep 2022 14:08) (98 - 103)  BP: 125/63 (19 Sep 2022 14:08) (125/63 - 168/51)  BP(mean): 82 (19 Sep 2022 05:30) (81 - 82)  RR: 18 (19 Sep 2022 14:08) (18 - 18)  SpO2: 99% (19 Sep 2022 14:08) (99% - 100%)    Parameters below as of 19 Sep 2022 14:08  Patient On (Oxygen Delivery Method): room air        PHYSICAL EXAM:  GENERAL: NAD  EYES: clear conjunctiva; EOMI  ENMT: Moist mucous membranes  NECK: Supple, No JVD, Normal thyroid  CHEST/LUNG: Clear to auscultation bilaterally; No rales, rhonchi, wheezing, or rubs  HEART: S1, S2, Regular rate and rhythm  ABDOMEN: Soft, Nontender, Nondistended; Bowel sounds present  NEURO: Alert & Oriented X3  EXTREMITIES: No LE edema, no calf tenderness  LYMPH: No lymphadenopathy noted  SKIN: No rashes or lesions    Consultant(s) Notes Reviewed:  [x ] YES  [ ] NO  Care Discussed with Consultants/Other Providers [ x] YES  [ ] NO    LABS:                        9.6    8.49  )-----------( 217      ( 19 Sep 2022 10:40 )             30.5     09-19    140  |  109<H>  |  57<H>  ----------------------------<  120<H>  4.5   |  23  |  1.96<H>    Ca    8.0<L>      19 Sep 2022 10:40        CAPILLARY BLOOD GLUCOSE                RADIOLOGY & ADDITIONAL TESTS:    Imaging Personally Reviewed:  [ ] YES  [ ] NO   Patient is a 89y old  Female who presents with a chief complaint of Right Hip fracture (19 Sep 2022 14:43)    OVERNIGHT EVENTS: no acute events overnight, offering no new complaints     REVIEW OF SYSTEMS:  CONSTITUTIONAL: No fever, chills  RESPIRATORY: No cough, SOB  CARDIOVASCULAR: No chest pain, palpitations  GASTROINTESTINAL: No abdominal pain. No nausea, vomiting, or diarrhea  GENITOURINARY: No dysuria  NEUROLOGICAL: No HA  MUSCULOSKELETAL: +ve intermittent right hip pain       T(C): 36.7 (09-19-22 @ 14:08), Max: 36.9 (09-18-22 @ 21:31)  HR: 98 (09-19-22 @ 14:08) (98 - 103)  BP: 125/63 (09-19-22 @ 14:08) (125/63 - 168/51)  RR: 18 (09-19-22 @ 14:08) (18 - 18)  SpO2: 99% (09-19-22 @ 14:08) (99% - 100%)  Wt(kg): --Vital Signs Last 24 Hrs  T(C): 36.7 (19 Sep 2022 14:08), Max: 36.9 (18 Sep 2022 21:31)  T(F): 98 (19 Sep 2022 14:08), Max: 98.4 (18 Sep 2022 21:31)  HR: 98 (19 Sep 2022 14:08) (98 - 103)  BP: 125/63 (19 Sep 2022 14:08) (125/63 - 168/51)  BP(mean): 82 (19 Sep 2022 05:30) (81 - 82)  RR: 18 (19 Sep 2022 14:08) (18 - 18)  SpO2: 99% (19 Sep 2022 14:08) (99% - 100%)    Parameters below as of 19 Sep 2022 14:08  Patient On (Oxygen Delivery Method): room air    MEDICATIONS  (STANDING):  enoxaparin Injectable 30 milliGRAM(s) SubCutaneous every 24 hours  ferrous    sulfate 325 milliGRAM(s) Oral daily  metoprolol tartrate 25 milliGRAM(s) Oral two times a day  polyethylene glycol 3350 17 Gram(s) Oral two times a day  senna 2 Tablet(s) Oral at bedtime  simvastatin 40 milliGRAM(s) Oral at bedtime    MEDICATIONS  (PRN):  metoclopramide Injectable 10 milliGRAM(s) IV Push once PRN Nausea and/or Vomiting  ondansetron Injectable 4 milliGRAM(s) IV Push every 6 hours PRN Nausea and/or Vomiting  oxyCODONE    IR 2.5 milliGRAM(s) Oral every 4 hours PRN Moderate Pain (4 - 6)  oxyCODONE    IR 5 milliGRAM(s) Oral every 4 hours PRN Severe Pain (7 - 10)      PHYSICAL EXAM:  GENERAL: NAD  EYES: clear conjunctiva  ENMT: Moist mucous membranes  NECK: Supple, No JVD  CHEST/LUNG: Clear to auscultation bilaterally; No rales, rhonchi, wheezing, or rubs  HEART: S1, S2, Regular rate and rhythm  ABDOMEN: Soft, Nontender, Nondistended; Bowel sounds present  NEURO: Alert & Oriented X3  EXTREMITIES: No LE edema, no calf tenderness, right leg with limited ROM due to pain   SKIN: right hip with dsg   : valle with clear yellow urine     Consultant(s) Notes Reviewed:  [x ] YES  [ ] NO  Care Discussed with Consultants/Other Providers [ x] YES  [ ] NO    LABS:                        9.6    8.49  )-----------( 217      ( 19 Sep 2022 10:40 )             30.5     09-19    140  |  109<H>  |  57<H>  ----------------------------<  120<H>  4.5   |  23  |  1.96<H>    Ca    8.0<L>      19 Sep 2022 10:40      CAPILLARY BLOOD GLUCOSE  RADIOLOGY & ADDITIONAL TESTS:  < from: US Renal (09.16.22 @ 15:08) >    ACC: [id number] EXAM:  US KIDNEY(S)                          PROCEDURE DATE:  09/16/2022          INTERPRETATION:  CLINICAL INFORMATION: Acute kidney injury. Evaluate for   urinary retention.    COMPARISON: None available.    TECHNIQUE: Sonography ofthe kidneys and bladder.    FINDINGS:  Right kidney: 8.3 cm. No solid renal mass, hydronephrosis or calculi.   There are cysts measuring up to 1.5 cm.    Left kidney: 8.9 cm. No solid renal mass, hydronephrosis or calculi.   There is a 1.3 cm lower pole cyst.    Urinary bladder: Not visualized.    IMPRESSION:  Bilateral renal cysts.    No hydronephrosis bilaterally.    < end of copied text >  < from: CT Pelvis Bony Only No Cont (09.13.22 @ 20:33) >    ACC: [id number] EXAM:  CT PELVIS BONY ONLY                          PROCEDURE DATE:  09/13/2022          INTERPRETATION:  CT PELVIS BONY dated 9/13/2022 8:33 PM    INDICATION: Pain after fall.    COMPARISON: Hip radiographs of the same date    TECHNIQUE: CT imaging of the pelvis was performed. The data was   reformatted in the axial, coronal, and sagittal planes. Additionally, 3-D   reformatted imaging was created.    FINDINGS:    OSSEOUS STRUCTURES: Acute comminuted intratrochanteric fracture with   impaction of the right proximal femur. There is involvement of the   femoral neck and mild extension into the subtrochanteric region. Mild   avulsion of the lesser trochanter. Mild displacement of the fracture   fragments. There are old fracturesof the left superior and inferior   pubic rami without osseous union appreciated. Narrowing the hip joint   spaces. No dislocation. Old left sacral alar fracture. Sclerosis and   degenerative changes of both sacroiliac joints.  SYNOVIUM/ JOINT FLUID:Small right hip effusion.  TENDONS: Calcifications at the proximal hamstring tendons bilaterally.   Suspect partial tearing of the gluteus medius tendon on the right.  MUSCLES: Moderate enlargement of the right adductor muscles in keeping   with intramuscular hematoma.  NEUROVASCULAR STRUCTURES: Vascular calcifications are noted.  INTRAPELVIC SOFT TISSUES: Valle catheter and gas within a nondistended   urinary bladder.  SUBCUTANEOUS SOFT TISSUES: Mild soft tissue swelling about the pelvis.    3-Dreformatted imaging confirms these findings.    IMPRESSION:    1.  Comminuted right intratrochanteric fracture as detailed above.  2.  Moderate right adductor muscle hematoma.  3.  Old fractures of left superior and inferior pubic rami without   osseous healing.  4.  Old left sacral alar fracture deformity.  5.  Degenerative changes.    < end of copied text >

## 2022-09-20 ENCOUNTER — TRANSCRIPTION ENCOUNTER (OUTPATIENT)
Age: 87
End: 2022-09-20

## 2022-09-20 VITALS — SYSTOLIC BLOOD PRESSURE: 152 MMHG | HEART RATE: 113 BPM | OXYGEN SATURATION: 96 % | DIASTOLIC BLOOD PRESSURE: 83 MMHG

## 2022-09-20 LAB
ANION GAP SERPL CALC-SCNC: 8 MMOL/L — SIGNIFICANT CHANGE UP (ref 5–17)
BUN SERPL-MCNC: 50 MG/DL — HIGH (ref 7–18)
CALCIUM SERPL-MCNC: 9.2 MG/DL — SIGNIFICANT CHANGE UP (ref 8.4–10.5)
CHLORIDE SERPL-SCNC: 108 MMOL/L — SIGNIFICANT CHANGE UP (ref 96–108)
CO2 SERPL-SCNC: 23 MMOL/L — SIGNIFICANT CHANGE UP (ref 22–31)
CREAT SERPL-MCNC: 1.64 MG/DL — HIGH (ref 0.5–1.3)
EGFR: 29 ML/MIN/1.73M2 — LOW
GLUCOSE SERPL-MCNC: 125 MG/DL — HIGH (ref 70–99)
HCT VFR BLD CALC: 33 % — LOW (ref 34.5–45)
HGB BLD-MCNC: 10.7 G/DL — LOW (ref 11.5–15.5)
MCHC RBC-ENTMCNC: 31.8 PG — SIGNIFICANT CHANGE UP (ref 27–34)
MCHC RBC-ENTMCNC: 32.4 GM/DL — SIGNIFICANT CHANGE UP (ref 32–36)
MCV RBC AUTO: 97.9 FL — SIGNIFICANT CHANGE UP (ref 80–100)
NRBC # BLD: 0 /100 WBCS — SIGNIFICANT CHANGE UP (ref 0–0)
PLATELET # BLD AUTO: 272 K/UL — SIGNIFICANT CHANGE UP (ref 150–400)
POTASSIUM SERPL-MCNC: 4.8 MMOL/L — SIGNIFICANT CHANGE UP (ref 3.5–5.3)
POTASSIUM SERPL-SCNC: 4.8 MMOL/L — SIGNIFICANT CHANGE UP (ref 3.5–5.3)
RBC # BLD: 3.37 M/UL — LOW (ref 3.8–5.2)
RBC # FLD: 15.1 % — HIGH (ref 10.3–14.5)
SODIUM SERPL-SCNC: 139 MMOL/L — SIGNIFICANT CHANGE UP (ref 135–145)
WBC # BLD: 10.93 K/UL — HIGH (ref 3.8–10.5)
WBC # FLD AUTO: 10.93 K/UL — HIGH (ref 3.8–10.5)

## 2022-09-20 PROCEDURE — 90686 IIV4 VACC NO PRSV 0.5 ML IM: CPT

## 2022-09-20 PROCEDURE — 86901 BLOOD TYPING SEROLOGIC RH(D): CPT

## 2022-09-20 PROCEDURE — 72192 CT PELVIS W/O DYE: CPT

## 2022-09-20 PROCEDURE — 76775 US EXAM ABDO BACK WALL LIM: CPT

## 2022-09-20 PROCEDURE — 82570 ASSAY OF URINE CREATININE: CPT

## 2022-09-20 PROCEDURE — 93005 ELECTROCARDIOGRAM TRACING: CPT

## 2022-09-20 PROCEDURE — 87635 SARS-COV-2 COVID-19 AMP PRB: CPT

## 2022-09-20 PROCEDURE — 99239 HOSP IP/OBS DSCHRG MGMT >30: CPT

## 2022-09-20 PROCEDURE — 84484 ASSAY OF TROPONIN QUANT: CPT

## 2022-09-20 PROCEDURE — 84540 ASSAY OF URINE/UREA-N: CPT

## 2022-09-20 PROCEDURE — 99285 EMERGENCY DEPT VISIT HI MDM: CPT

## 2022-09-20 PROCEDURE — 99497 ADVNCD CARE PLAN 30 MIN: CPT

## 2022-09-20 PROCEDURE — 93306 TTE W/DOPPLER COMPLETE: CPT

## 2022-09-20 PROCEDURE — 80048 BASIC METABOLIC PNL TOTAL CA: CPT

## 2022-09-20 PROCEDURE — 86850 RBC ANTIBODY SCREEN: CPT

## 2022-09-20 PROCEDURE — 82728 ASSAY OF FERRITIN: CPT

## 2022-09-20 PROCEDURE — 85730 THROMBOPLASTIN TIME PARTIAL: CPT

## 2022-09-20 PROCEDURE — 83540 ASSAY OF IRON: CPT

## 2022-09-20 PROCEDURE — P9040: CPT

## 2022-09-20 PROCEDURE — 36430 TRANSFUSION BLD/BLD COMPNT: CPT

## 2022-09-20 PROCEDURE — 84300 ASSAY OF URINE SODIUM: CPT

## 2022-09-20 PROCEDURE — 84100 ASSAY OF PHOSPHORUS: CPT

## 2022-09-20 PROCEDURE — 85025 COMPLETE CBC W/AUTO DIFF WBC: CPT

## 2022-09-20 PROCEDURE — 97110 THERAPEUTIC EXERCISES: CPT

## 2022-09-20 PROCEDURE — 86160 COMPLEMENT ANTIGEN: CPT

## 2022-09-20 PROCEDURE — 97530 THERAPEUTIC ACTIVITIES: CPT

## 2022-09-20 PROCEDURE — 76000 FLUOROSCOPY <1 HR PHYS/QHP: CPT

## 2022-09-20 PROCEDURE — 71045 X-RAY EXAM CHEST 1 VIEW: CPT

## 2022-09-20 PROCEDURE — 83735 ASSAY OF MAGNESIUM: CPT

## 2022-09-20 PROCEDURE — 96375 TX/PRO/DX INJ NEW DRUG ADDON: CPT

## 2022-09-20 PROCEDURE — C1889: CPT

## 2022-09-20 PROCEDURE — 73502 X-RAY EXAM HIP UNI 2-3 VIEWS: CPT

## 2022-09-20 PROCEDURE — 81001 URINALYSIS AUTO W/SCOPE: CPT

## 2022-09-20 PROCEDURE — C1769: CPT

## 2022-09-20 PROCEDURE — 96374 THER/PROPH/DIAG INJ IV PUSH: CPT

## 2022-09-20 PROCEDURE — 80053 COMPREHEN METABOLIC PANEL: CPT

## 2022-09-20 PROCEDURE — 86923 COMPATIBILITY TEST ELECTRIC: CPT

## 2022-09-20 PROCEDURE — 36415 COLL VENOUS BLD VENIPUNCTURE: CPT

## 2022-09-20 PROCEDURE — 99232 SBSQ HOSP IP/OBS MODERATE 35: CPT

## 2022-09-20 PROCEDURE — 87086 URINE CULTURE/COLONY COUNT: CPT

## 2022-09-20 PROCEDURE — C1713: CPT

## 2022-09-20 PROCEDURE — 83880 ASSAY OF NATRIURETIC PEPTIDE: CPT

## 2022-09-20 PROCEDURE — 85610 PROTHROMBIN TIME: CPT

## 2022-09-20 PROCEDURE — 82550 ASSAY OF CK (CPK): CPT

## 2022-09-20 PROCEDURE — 83550 IRON BINDING TEST: CPT

## 2022-09-20 PROCEDURE — 73552 X-RAY EXAM OF FEMUR 2/>: CPT

## 2022-09-20 PROCEDURE — 85027 COMPLETE CBC AUTOMATED: CPT

## 2022-09-20 PROCEDURE — 86900 BLOOD TYPING SEROLOGIC ABO: CPT

## 2022-09-20 PROCEDURE — 82436 ASSAY OF URINE CHLORIDE: CPT

## 2022-09-20 RX ORDER — OXYCODONE HYDROCHLORIDE 5 MG/1
1 TABLET ORAL
Qty: 0 | Refills: 0 | DISCHARGE
Start: 2022-09-20

## 2022-09-20 RX ORDER — ACETAMINOPHEN 500 MG
1 TABLET ORAL
Qty: 0 | Refills: 0 | DISCHARGE

## 2022-09-20 RX ORDER — INFLUENZA VIRUS VACCINE 15; 15; 15; 15 UG/.5ML; UG/.5ML; UG/.5ML; UG/.5ML
0.5 SUSPENSION INTRAMUSCULAR ONCE
Refills: 0 | Status: COMPLETED | OUTPATIENT
Start: 2022-09-20 | End: 2022-09-20

## 2022-09-20 RX ORDER — SIMVASTATIN 20 MG/1
1 TABLET, FILM COATED ORAL
Qty: 0 | Refills: 0 | DISCHARGE
Start: 2022-09-20

## 2022-09-20 RX ORDER — GABAPENTIN 400 MG/1
100 CAPSULE ORAL
Refills: 0 | Status: DISCONTINUED | OUTPATIENT
Start: 2022-09-20 | End: 2022-09-20

## 2022-09-20 RX ORDER — METOPROLOL TARTRATE 50 MG
1 TABLET ORAL
Qty: 0 | Refills: 0 | DISCHARGE
Start: 2022-09-20

## 2022-09-20 RX ORDER — SENNA PLUS 8.6 MG/1
2 TABLET ORAL
Qty: 0 | Refills: 0 | DISCHARGE
Start: 2022-09-20

## 2022-09-20 RX ORDER — SIMVASTATIN 20 MG/1
1 TABLET, FILM COATED ORAL
Qty: 0 | Refills: 0 | DISCHARGE

## 2022-09-20 RX ORDER — CELECOXIB 200 MG/1
1 CAPSULE ORAL
Qty: 0 | Refills: 0 | DISCHARGE

## 2022-09-20 RX ORDER — METOPROLOL TARTRATE 50 MG
1 TABLET ORAL
Qty: 0 | Refills: 0 | DISCHARGE

## 2022-09-20 RX ORDER — ENOXAPARIN SODIUM 100 MG/ML
30 INJECTION SUBCUTANEOUS
Qty: 0 | Refills: 0 | DISCHARGE
Start: 2022-09-20

## 2022-09-20 RX ORDER — FUROSEMIDE 40 MG
20 TABLET ORAL
Qty: 0 | Refills: 0 | DISCHARGE

## 2022-09-20 RX ORDER — METOPROLOL TARTRATE 50 MG
25 TABLET ORAL ONCE
Refills: 0 | Status: COMPLETED | OUTPATIENT
Start: 2022-09-20 | End: 2022-09-20

## 2022-09-20 RX ORDER — POLYETHYLENE GLYCOL 3350 17 G/17G
17 POWDER, FOR SOLUTION ORAL
Qty: 0 | Refills: 0 | DISCHARGE
Start: 2022-09-20

## 2022-09-20 RX ORDER — LACTULOSE 10 G/15ML
10 SOLUTION ORAL ONCE
Refills: 0 | Status: DISCONTINUED | OUTPATIENT
Start: 2022-09-20 | End: 2022-09-20

## 2022-09-20 RX ADMIN — Medication 25 MILLIGRAM(S): at 20:34

## 2022-09-20 RX ADMIN — Medication 1 ENEMA: at 11:15

## 2022-09-20 RX ADMIN — Medication 325 MILLIGRAM(S): at 11:15

## 2022-09-20 RX ADMIN — POLYETHYLENE GLYCOL 3350 17 GRAM(S): 17 POWDER, FOR SOLUTION ORAL at 05:32

## 2022-09-20 RX ADMIN — ENOXAPARIN SODIUM 30 MILLIGRAM(S): 100 INJECTION SUBCUTANEOUS at 00:28

## 2022-09-20 RX ADMIN — Medication 25 MILLIGRAM(S): at 05:32

## 2022-09-20 RX ADMIN — GABAPENTIN 100 MILLIGRAM(S): 400 CAPSULE ORAL at 18:35

## 2022-09-20 RX ADMIN — Medication 25 MILLIGRAM(S): at 18:35

## 2022-09-20 RX ADMIN — OXYCODONE HYDROCHLORIDE 5 MILLIGRAM(S): 5 TABLET ORAL at 06:32

## 2022-09-20 RX ADMIN — OXYCODONE HYDROCHLORIDE 5 MILLIGRAM(S): 5 TABLET ORAL at 11:15

## 2022-09-20 RX ADMIN — LACTULOSE 10 GRAM(S): 10 SOLUTION ORAL at 11:15

## 2022-09-20 RX ADMIN — OXYCODONE HYDROCHLORIDE 5 MILLIGRAM(S): 5 TABLET ORAL at 05:46

## 2022-09-20 RX ADMIN — POLYETHYLENE GLYCOL 3350 17 GRAM(S): 17 POWDER, FOR SOLUTION ORAL at 18:35

## 2022-09-20 RX ADMIN — OXYCODONE HYDROCHLORIDE 5 MILLIGRAM(S): 5 TABLET ORAL at 17:34

## 2022-09-20 NOTE — PROGRESS NOTE ADULT - PROBLEM SELECTOR PLAN 2
-RAMSEY on baseline stage 3CKD   -renal US as above   -renal function improving   -Nephro Dr. Tucker   -avoid nephrotoxins

## 2022-09-20 NOTE — PROGRESS NOTE ADULT - ASSESSMENT
Patient is a 89 yo Lao speaking female with h/o HTN, iron def anemia, HLD  presents with rt hip pain s/p fall. Found to have right intratrochanteric fracture s/p OR 9/15 with insertion of locking intramedullary lupe into right hip. Hospital course complicated by RAMSEY. Nephrology consulted for Elevated serum creatinine.    1. RAMSEY- unknown baseline SCr; sadie SCr 1.2-1.3. RAMSEY likely hemodynamically mediated/ ATN. UA with 30 protein and mod blood; defer secondary w/u due to advanced age. FeNa 1.02%- inconclusive. Renal function further improving.    Continue to monitor off IVF. Bladder scan 9/15 neg for retention. C3 and C4 wnl; no signs of atheroemboli. - should not cause RAMSEY. Renal US with no hydro b/l. Strict I/Os. Avoid nephrotoxins/ NSAIDs/ RCA. Monitor BMP.  2. Essential HTN- BP acceptable.  Pt on Metoprolol 25mg PO bid. Avoid hypotension. Rate control as per primary team.   3. Rt hip fracture-  s/p OR 9/15 with insertion of locking intramedullary lupe into right hip. Ortho following. Pain control. Please avoid NSAIDS  4. Anemia- post op anemia. Hgb improving s/p 2 units prbc. tsat 7% with Ferritin 232- c/w Ferrous sulfate 325mg PO bid. Monitor h/h. Transfuse prbc prn.       [facility] NEPHROLOGY  Sai Moreland M.D.  Matt Mcguire D.O.  Lo Tucker M.D.  Charlette Hobbs, MITRA, ANP-C  [phone]    [id number] [id number]

## 2022-09-20 NOTE — DISCHARGE NOTE PROVIDER - NSDCCPCAREPLAN_GEN_ALL_CORE_FT
PRINCIPAL DISCHARGE DIAGNOSIS  Diagnosis: Fracture of right hip  Assessment and Plan of Treatment: You presented to hospital after a fall and you were found to have right hip fracture. You were evaluated by Orthopedic team and were recommended for surgical repair. You had Intramedullary nailing of right hip done on 9/15 which  is a  surgery to repair a broken bone and keep it stable. Stitches or staples used to close the surgery wound may be removed about 14 days after surgery. You can bear weight on the right  leg as tolerated.  You were evaluated by physical therapy and given your recent hip surgery and advanced age it is  recommended you go to subacute rehab for balance, strengthening and gait training.  --You are also prescribed blood thinner to prevent blood clots, take them as prescribed.   --Continue pain medications as prescribed  --Go to physical therapy as directed. A physical therapist can teach you exercises to help strength your bones.  --Apply ice over the surgery area for 15 to 20 minutes every hour or as directed. Use an ice pack, or put crushed ice in a plastic bag. Cover it with a towel. Ice helps prevent tissue damage and decreases swelling and pain.  --Follow up with Orthopedic surgeon within 1-2 weeks of discharge        SECONDARY DISCHARGE DIAGNOSES  Diagnosis: Acute kidney injury superimposed on CKD  Assessment and Plan of Treatment: Your have chronic kidney disease and your creatinine is high at baseline, during the hospital course your kidney function was elevated than your baseline which can be due the surgery and or medications. Your medications were adjusted and you were evaluated by kidney specialist. your kidney function slowly improved and remained stable   Continue medicatiosn as prescribed and follow up with PCP within 1 week to repeat blood work    Diagnosis: Anemia  Assessment and Plan of Treatment: your have low blood count at baseline line and were given 1 unit of blood during the hospital stay. your blood count improved and remained stable at your baseline    Diagnosis: HTN (hypertension)  Assessment and Plan of Treatment: Take all medication as prescribed.  Follow up with your medical doctor for routine blood pressure monitoring at your next visit.  Notify your doctor if you have any of the following symptoms:   Dizziness, Lightheadedness, Blurry vision, Headache, Chest pain, Shortness of breath       PRINCIPAL DISCHARGE DIAGNOSIS  Diagnosis: Fracture of right hip  Assessment and Plan of Treatment: You presented to hospital after a fall and you were found to have right hip fracture. You were evaluated by Orthopedic team and were recommended for surgical repair. You had Intramedullary nailing of right hip done on 9/15 which  is a  surgery to repair a broken bone and keep it stable. Stitches or staples used to close the surgery wound may be removed about 14 days after surgery. You can bear weight on the right  leg as tolerated.  You were evaluated by physical therapy and given your recent hip surgery and advanced age it is  recommended you go to subacute rehab for balance, strengthening and gait training.  --You are also prescribed blood thinner to prevent blood clots, take them as prescribed. STOP TAKING LOVENOX ON 9/30/2022  --Continue pain medications as prescribed  --Go to physical therapy as directed. A physical therapist can teach you exercises to help strength your bones.  --Apply ice over the surgery area for 15 to 20 minutes every hour or as directed. Use an ice pack, or put crushed ice in a plastic bag. Cover it with a towel. Ice helps prevent tissue damage and decreases swelling and pain.  --Follow up with Orthopedic surgeon within 1-2 weeks of discharge        SECONDARY DISCHARGE DIAGNOSES  Diagnosis: Acute kidney injury superimposed on CKD  Assessment and Plan of Treatment: Your have chronic kidney disease and your creatinine is high at baseline, during the hospital course your kidney function was elevated than your baseline which can be due the surgery and or medications. Your medications were adjusted and you were evaluated by kidney specialist. your kidney function slowly improved and remained stable   Continue medicatiosn as prescribed and follow up with PCP within 1 week to repeat blood work    Diagnosis: Anemia  Assessment and Plan of Treatment: your have low blood count at baseline line and were given 1 unit of blood during the hospital stay. your blood count improved and remained stable at your baseline    Diagnosis: HTN (hypertension)  Assessment and Plan of Treatment: Take all medication as prescribed.  Follow up with your medical doctor for routine blood pressure monitoring at your next visit.  Notify your doctor if you have any of the following symptoms:   Dizziness, Lightheadedness, Blurry vision, Headache, Chest pain, Shortness of breath       PRINCIPAL DISCHARGE DIAGNOSIS  Diagnosis: Fracture of right hip  Assessment and Plan of Treatment: You presented to hospital after a fall and you were found to have right hip fracture. You were evaluated by Orthopedic team and were recommended for surgical repair. You had Intramedullary nailing of right hip done on 9/15 which  is a  surgery to repair a broken bone and keep it stable. Stitches or staples used to close the surgery wound may be removed about 14 days after surgery. You can bear weight on the right  leg as tolerated.  You were evaluated by physical therapy and given your recent hip surgery and advanced age it is  recommended you go to subacute rehab for balance, strengthening and gait training.  --You are also prescribed blood thinner to prevent blood clots, take them as prescribed. STOP TAKING LOVENOX ON 9/30/2022  --Continue pain medications as prescribed  --Go to physical therapy as directed. A physical therapist can teach you exercises to help strength your bones.  --Apply ice over the surgery area for 15 to 20 minutes every hour or as directed. Use an ice pack, or put crushed ice in a plastic bag. Cover it with a towel. Ice helps prevent tissue damage and decreases swelling and pain.  --Follow up with Orthopedic surgeon within 1-2 weeks of discharge        SECONDARY DISCHARGE DIAGNOSES  Diagnosis: Acute kidney injury superimposed on CKD  Assessment and Plan of Treatment: Your have chronic kidney disease and your creatinine is high at baseline, during the hospital course your kidney function was elevated than your baseline which can be due the surgery and or medications. Your medications were adjusted and you were evaluated by kidney specialist. your kidney function slowly improved and remained stable   Continue medicatiosn as prescribed and follow up with PCP within 1 week to repeat blood work    Diagnosis: Constipation  Assessment and Plan of Treatment: You have constipation and you were given laxatives in the hospital  Continue taking Fleets enema once a day for 3 days until BM, stop after first BM   Continue taking Miralax twice a day until first bowel movement after that take it once a day   Take Senna 2 tablets at bedtime    Diagnosis: Anemia  Assessment and Plan of Treatment: your have low blood count at baseline line and were given 1 unit of blood during the hospital stay. your blood count improved and remained stable at your baseline    Diagnosis: HTN (hypertension)  Assessment and Plan of Treatment: Take all medication as prescribed.  Follow up with your medical doctor for routine blood pressure monitoring at your next visit.  Notify your doctor if you have any of the following symptoms:   Dizziness, Lightheadedness, Blurry vision, Headache, Chest pain, Shortness of breath       PRINCIPAL DISCHARGE DIAGNOSIS  Diagnosis: Fracture of right hip  Assessment and Plan of Treatment: You presented to hospital after a fall and you were found to have right hip fracture. You were evaluated by Orthopedic team and were recommended for surgical repair. You had Intramedullary nailing of right hip done on 9/15/22 which  is a  surgery to repair a broken bone and keep it stable. Stitches or staples used to close the surgery wound may be removed about 14 days after surgery. You can bear weight on the right  leg as tolerated.  You were evaluated by physical therapy and given your recent hip surgery and advanced age it is  recommended you go to subacute rehab for balance, strengthening and gait training.  --You are also prescribed blood thinner to prevent blood clots, take them as prescribed. STOP TAKING LOVENOX ON 9/30/2022  --Continue pain medications as prescribed which can increase your risk of constipation. Take stool softeners and laxatioves as prescribed.  HOLD LAXATIVES IF MORE THAN 3 BOWEL MOBVEMENTS IN A DAY  Participate in physical therapy as directed. A physical therapist can teach you exercises to help strength your bones.  --Apply ice over the surgery area for 15 to 20 minutes every hour or as directed. Use an ice pack, or put crushed ice in a plastic bag. Cover it with a towel. Ice helps prevent tissue damage and decreases swelling and pain.  --Follow up with Orthopedic surgeon within 1-2 weeks of discharge        SECONDARY DISCHARGE DIAGNOSES  Diagnosis: Anemia  Assessment and Plan of Treatment: Your have low blood count at baseline line and were given 1 unit of blood during the hospital stay. your blood count improved and remained stable at your baseline. Monitor blood counts closely.    Diagnosis: Acute kidney injury superimposed on CKD  Assessment and Plan of Treatment: Your have chronic kidney disease and your creatinine is high at baseline, during the hospital course your kidney function was elevated than your baseline which can be due the surgery and or medications. Your medications were adjusted and you were evaluated by kidney specialist. your kidney function slowly improved and remained stable   Continue medicatiosn as prescribed and follow up with PCP within 1 week to repeat blood work  Avoid medications like NSAIDs (Ibuprofen, Motrin, Advil, Aleeve) which can affect kidney functions.    Diagnosis: HTN (hypertension)  Assessment and Plan of Treatment: Take all medication as prescribed.  Follow up with your medical doctor for routine blood pressure monitoring at your next visit.  Notify your doctor if you have any of the following symptoms:   Dizziness, Lightheadedness, Blurry vision, Headache, Chest pain, Shortness of breath      Diagnosis: Constipation  Assessment and Plan of Treatment: You have constipation and you were given laxatives in the hospital.  This is likely form pain medications and immobility.   Continue taking Fleets enema once a day for 3 days until BM, stop after first Bowel movements.   Continue taking Miralax twice a day until first bowel movement after that take it once a day or as needed  Take Senna 2 tablets at bedtime    Diagnosis: At risk for constipation  Assessment and Plan of Treatment:      PRINCIPAL DISCHARGE DIAGNOSIS  Diagnosis: Fracture of right hip  Assessment and Plan of Treatment: You presented to hospital after a fall and you were found to have right hip fracture. You were evaluated by Orthopedic team and were recommended for surgical repair. You had Intramedullary nailing of right hip done on 9/15/22 which  is a  surgery to repair a broken bone and keep it stable. Stitches or staples used to close the surgery wound may be removed about 14 days after surgery. You can bear weight on the right  leg as tolerated.  You were evaluated by physical therapy and given your recent hip surgery and advanced age it is  recommended you go to subacute rehab for balance, strengthening and gait training.  --You are also prescribed blood thinner to prevent blood clots, take them as prescribed. STOP TAKING LOVENOX ON 9/30/2022  --Continue pain medications as prescribed which can increase your risk of constipation. Take stool softeners and laxatioves as prescribed.  HOLD LAXATIVES IF MORE THAN 3 BOWEL MOBVEMENTS IN A DAY  Participate in physical therapy as directed. A physical therapist can teach you exercises to help strength your bones.  --Apply ice over the surgery area for 15 to 20 minutes every hour or as directed. Use an ice pack, or put crushed ice in a plastic bag. Cover it with a towel. Ice helps prevent tissue damage and decreases swelling and pain.  --Follow up with Orthopedic surgeon within 1-2 weeks of discharge        SECONDARY DISCHARGE DIAGNOSES  Diagnosis: Anemia  Assessment and Plan of Treatment: Your have low blood count at baseline line and were given 1 unit of blood during the hospital stay. your blood count improved and remained stable at your baseline. Monitor blood counts closely.    Diagnosis: Acute kidney injury superimposed on CKD  Assessment and Plan of Treatment: Your have chronic kidney disease and your creatinine is high at baseline, during the hospital course your kidney function was elevated than your baseline which can be due the surgery and or medications. Your medications were adjusted and you were evaluated by kidney specialist. your kidney function slowly improved and remained stable.  You may possibly has underlying Acute Tubular Necrosis (ATN) possibly contributing to your acute worsening of kidney function.   Continue medicatiosn as prescribed and follow up with PCP within 1 week to repeat blood work  Avoid medications like NSAIDs (Ibuprofen, Motrin, Advil, Aleeve) which can affect kidney functions.      Diagnosis: HTN (hypertension)  Assessment and Plan of Treatment: Take all medication as prescribed.  Follow up with your medical doctor for routine blood pressure monitoring at your next visit.  Notify your doctor if you have any of the following symptoms:   Dizziness, Lightheadedness, Blurry vision, Headache, Chest pain, Shortness of breath      Diagnosis: Constipation  Assessment and Plan of Treatment: You have constipation and you were given laxatives in the hospital.  This is likely form pain medications and immobility.   Continue taking Fleets enema once a day for 3 days until BM, stop after first Bowel movements.   Continue taking Miralax twice a day until first bowel movement after that take it once a day or as needed  Take Senna 2 tablets at bedtime    Diagnosis: At risk for constipation  Assessment and Plan of Treatment:      PRINCIPAL DISCHARGE DIAGNOSIS  Diagnosis: Fracture of right hip  Assessment and Plan of Treatment: You presented to hospital after a fall and you were found to have right hip fracture. You were evaluated by Orthopedic team and were recommended for surgical repair. You had Intramedullary nailing of right hip done on 9/15/22 which  is a  surgery to repair a broken bone and keep it stable. Stitches or staples used to close the surgery wound may be removed about 14 days after surgery. You can bear weight on the right  leg as tolerated.  You were evaluated by physical therapy and given your recent hip surgery and advanced age it is  recommended you go to subacute rehab for balance, strengthening and gait training.  --You are also prescribed blood thinner to prevent blood clots, take them as prescribed. STOP TAKING LOVENOX ON 9/30/2022  --Continue pain medications as prescribed which can increase your risk of constipation. Take stool softeners and laxatioves as prescribed.  HOLD LAXATIVES IF MORE THAN 3 BOWEL MOBVEMENTS IN A DAY  Participate in physical therapy as directed. A physical therapist can teach you exercises to help strength your bones.  --Apply ice over the surgery area for 15 to 20 minutes every hour or as directed. Use an ice pack, or put crushed ice in a plastic bag. Cover it with a towel. Ice helps prevent tissue damage and decreases swelling and pain.  --Follow up with Orthopedic surgeon within 1-2 weeks of discharge        SECONDARY DISCHARGE DIAGNOSES  Diagnosis: Acute kidney injury superimposed on CKD  Assessment and Plan of Treatment: Your have chronic kidney disease and your creatinine is high at baseline, during the hospital course your kidney function was elevated than your baseline which can be due the surgery and or medications. Your medications were adjusted and you were evaluated by kidney specialist. your kidney function slowly improved and remained stable.  You may possibly has underlying Acute Tubular Necrosis (ATN) possibly contributing to your acute worsening of kidney function.   Continue medicatiosn as prescribed and follow up with PCP within 1 week to repeat blood work  Avoid medications like NSAIDs (Ibuprofen, Motrin, Advil, Aleeve) which can affect kidney functions.      Diagnosis: Constipation  Assessment and Plan of Treatment: You have constipation and you were given laxatives in the hospital.  This is likely form pain medications and immobility.   Continue taking Fleets enema once a day for 3 days until BM, stop after first Bowel movements.   Continue taking Miralax twice a day until first bowel movement after that take it once a day or as needed  Take Senna 2 tablets at bedtime    Diagnosis: Anemia  Assessment and Plan of Treatment: Your have low blood count at baseline line and were given 1 unit of blood during the hospital stay. your blood count improved and remained stable at your baseline. Monitor blood counts closely.    Diagnosis: Advance care planning  Assessment and Plan of Treatment: During your hospitalization, goals of care were discuessed with you and your family.  A MOLST form was drafted.  The MOLST form has all your medical wishes, such as DNR/DNI and can be honored by any facility      Diagnosis: HTN (hypertension)  Assessment and Plan of Treatment: Take all medication as prescribed.  Follow up with your medical doctor for routine blood pressure monitoring at your next visit.  Notify your doctor if you have any of the following symptoms:   Dizziness, Lightheadedness, Blurry vision, Headache, Chest pain, Shortness of breath      Diagnosis: At risk for constipation  Assessment and Plan of Treatment:

## 2022-09-20 NOTE — DISCHARGE NOTE PROVIDER - PROVIDER TOKENS
PROVIDER:[TOKEN:[[id number]],FOLLOWUP:[2 weeks]],PROVIDER:[TOKEN:[[id number]],FOLLOWUP:[1 week]]

## 2022-09-20 NOTE — PROGRESS NOTE ADULT - PROBLEM SELECTOR PLAN 5
-cont home dose Statin
- continue with statin
-cont home dose Statin
- continue with statin
- continue with statin

## 2022-09-20 NOTE — DISCHARGE NOTE NURSING/CASE MANAGEMENT/SOCIAL WORK - NSDCPEFALRISK_GEN_ALL_CORE
For information on Fall & Injury Prevention, visit: [email or web address] OR  [email or web address] OR  https://www.cdc.gov/steadi/patient.html

## 2022-09-20 NOTE — PROGRESS NOTE ADULT - ASSESSMENT
89 y/o female with PMHx of HTN, HLD presented with right hip pain, sp fall backwards at home. In ED:  Xray showed right displaced hip fracture, CXR with clear lungs. and Creatinine  1.3 (similar to creatinine from 2 months ago). Found to be positive for COVID:  asymptomatic-- prior COVID positive test in July. patient admitted for right intertrochanteric femoral fracture with CT pelvis confirmed for Comminuted right intratrochanteric fracture as detailed above. Moderate right adductor muscle hematoma. s/p OR 9/15 POD, R hip IM nailing. hospital course complicated by decreased urine output with no urine noted on bladder scan. bolus given and Guardado catheter inserted with minimal output. noted with up trending creatinine. Nephro Dr. cannon consulted. Also found to have low hg, s/p 1 PRBC.  Renal US with renal cysts and no acute findings, renal function slowly improving  PT recommended LYNN, pending insurance auth, CM following

## 2022-09-20 NOTE — DISCHARGE NOTE NURSING/CASE MANAGEMENT/SOCIAL WORK - PATIENT PORTAL LINK FT
You can access the FollowMyHealth Patient Portal offered by [facility] by registering at the following website: [email or web address]. By joining [facility]’s FollowMyHealth portal, you will also be able to view your health information using other applications (apps) compatible with our system.

## 2022-09-20 NOTE — GOALS OF CARE CONVERSATION - ADVANCED CARE PLANNING - CONVERSATION DETAILS
Spoke to pt's son Everardo and daughter Dasha at bedside, they reported that 2 sons and 1 daughter they are the HCP for the pt and makes the decision for pt collectively. Pt lives in a apartment by herself and pt's daughter lives upstairs. Pt is dependent on the walker and has a HHA 4.5 hrs per day for 7 days. Pt requires assistance with meal prep and doctors appointments. Pt's daughter and HHA helps pt with the meals, shopping and they also mange pt's medications. advance directives and GOC were reviewed, pt's family adamantly reported that pt does not want any resuscitation and was very clear in the decision, they also shared that their father was on life support for 3 years and it was very difficult for them to see a loved one suffer like that and they never want to encounter that again.   Also discussed about cardiac and respiratory resuscitative measures including CPR, shock, vasopressors, invasive ventilatory support via intubation. All risks and benefits discussed. All questions answered.     Pt is DNR/DNI with no feeding tube   MOLST form drafted

## 2022-09-20 NOTE — PROGRESS NOTE ADULT - PROBLEM SELECTOR PROBLEM 1
Acute right hip pain
Fracture of right hip
Acute right hip pain
Fracture of right hip

## 2022-09-20 NOTE — DISCHARGE NOTE PROVIDER - NSDCMRMEDTOKEN_GEN_ALL_CORE_FT
acetaminophen 500 mg oral tablet: 1 tab(s) orally every 8 hours, As Needed for mild to moderate pain   alendronate 70 mg oral tablet: 1 tab(s) orally once a week  aspirin 81 mg oral tablet, chewable: 1 tab(s) orally once a day  CeleBREX 200 mg oral capsule: 1 cap(s) orally once a day  ferrous sulfate 325 mg oral delayed release tablet: 1 tab(s) orally once a day  gabapentin 100 mg oral capsule: 1 tab(s) orally 2 times a day  metoprolol tartrate 25 mg oral tablet: 1 tab(s) orally 2 times a day  oxyCODONE 5 mg oral tablet: 1 tab(s) orally every 6 hours, As Needed - 10)  polyethylene glycol 3350 oral powder for reconstitution: 17 gram(s) orally 2 times a day  senna leaf extract oral tablet: 2 tab(s) orally once a day (at bedtime)  simvastatin 40 mg oral tablet: 1 tab(s) orally once a day (at bedtime)  Vitamin B-100 oral tablet: 1 tab(s) orally once a day   acetaminophen 500 mg oral tablet: 1 tab(s) orally every 8 hours, As Needed for mild to moderate pain   alendronate 70 mg oral tablet: 1 tab(s) orally once a week  aspirin 81 mg oral tablet, chewable: 1 tab(s) orally once a day  CeleBREX 200 mg oral capsule: 1 cap(s) orally once a day  enoxaparin: 30 milligram(s) subcutaneous once a day  LAST DAY 9/30/2022  ferrous sulfate 325 mg oral delayed release tablet: 1 tab(s) orally once a day  gabapentin 100 mg oral capsule: 1 tab(s) orally 2 times a day  metoprolol tartrate 25 mg oral tablet: 1 tab(s) orally 2 times a day  oxyCODONE 5 mg oral tablet: 1 tab(s) orally every 6 hours, As Needed - 10)  polyethylene glycol 3350 oral powder for reconstitution: 17 gram(s) orally 2 times a day  senna leaf extract oral tablet: 2 tab(s) orally once a day (at bedtime)  simvastatin 40 mg oral tablet: 1 tab(s) orally once a day (at bedtime)  Vitamin B-100 oral tablet: 1 tab(s) orally once a day   acetaminophen 500 mg oral tablet: 1 tab(s) orally every 8 hours, As Needed for mild to moderate pain   alendronate 70 mg oral tablet: 1 tab(s) orally once a week  aspirin 81 mg oral tablet, chewable: 1 tab(s) orally once a day  enoxaparin: 30 milligram(s) subcutaneous once a day  LAST DAY 9/30/2022  ferrous sulfate 325 mg oral delayed release tablet: 1 tab(s) orally once a day  Fleet Enema 19 g-7 g rectal enema: 133 milliliter(s) rectally once a day X3 DAY   STOP IF HAVING LOOSE BM   gabapentin 100 mg oral capsule: 1 tab(s) orally 2 times a day  metoprolol tartrate 25 mg oral tablet: 1 tab(s) orally 2 times a day  oxyCODONE 5 mg oral tablet: 1 tab(s) orally every 6 hours, As Needed for severe pain  polyethylene glycol 3350 oral powder for reconstitution: 17 gram(s) orally 2 times a day  senna leaf extract oral tablet: 2 tab(s) orally once a day (at bedtime)  simvastatin 40 mg oral tablet: 1 tab(s) orally once a day (at bedtime)  Vitamin B-100 oral tablet: 1 tab(s) orally once a day

## 2022-09-20 NOTE — PROGRESS NOTE ADULT - PROVIDER SPECIALTY LIST ADULT
Nephrology
Orthopedics
Internal Medicine
Internal Medicine
Nephrology
Orthopedics
Orthopedics
Pain Medicine
Internal Medicine
Nephrology
Nephrology
Orthopedics
Pain Medicine
Cardiology
Internal Medicine
Internal Medicine
Cardiology

## 2022-09-20 NOTE — PROGRESS NOTE ADULT - ASSESSMENT
Confidential Drug Utilization Report  Search Terms: everardo fernandez, 07/16/1930Search Date: 09/14/2022 08:51:51 AM  The Drug Utilization Report below displays all of the controlled substance prescriptions, if any, that your patient has filled in the last twelve months. The information displayed on this report is compiled from pharmacy submissions to the Department, and accurately reflects the information as submitted by the pharmacies.    This report was requested by: Mery Paulson | Reference #: [id number]    You have not added a ROMINA number. Keeping your ROMINA number(s) up to date on the My ROMINA # page will enable the separation of your prescriptions from others in the search results.    Others' Prescriptions  Patient Name: Everardo FernandezBirth Date: 07/16/1930  Address: -[address] [place] [id number]: Female  Rx Written	Rx Dispensed	Drug	Quantity	Days Supply	Prescriber Name	Prescriber Romina #	Payment Method  05/26/2022	09/01/2022	acetaminophen-cod #3 tablet	90	30	SANDRA Castañeda DO	[id number]	Insurance  [facility]  05/26/2022	05/26/2022	acetaminophen-cod #3 tablet	90	30	PisciottSANDRA pryor DO	[id number]	Insurance  [facility]  11/11/2021	02/19/2022	acetaminophen-cod #3 tablet	90	30	Pisciotto, Tyshawn A DO	[id number]	Insurance  [facility]  11/11/2021	11/12/2021	acetaminophen-cod #3 tablet	90	30	Pisciotto, Tyshawn A DO	[id number]	Insurance  [facility]  * - Drugs marked with an asterisk are compound drugs. If the compound drug is made up of more than one controlled substance, then each controlled substance will be a separate row in the table.

## 2022-09-20 NOTE — PROGRESS NOTE ADULT - REASON FOR ADMISSION
Right Hip fracture

## 2022-09-20 NOTE — PROGRESS NOTE ADULT - NS ATTEND AMEND GEN_ALL_CORE FT
Mary Anne seen and examined in the morning around 10.30 AM 9/20/22    89 y/o female with PMHx of HTN, HLD presented with right hip pain, sp fall  backwards at home. Admit for Right hip fracture and RAMSEY on CKD, anemia.    Patient doing well; c/o Pain in the right leg and left knee; also no BM for few days despite Laxatives and stool softeners.     Vitals: reviewed stable    P/E: as above  Right hip tenderness with swelling to thigh  Abdomen mildly distended and mild tenderness on deep palpation    Labs:                        10.7   10.93 )-----------( 272      ( 20 Sep 2022 06:19 )             33.0   09-20    139  |  108  |  50<H>  ----------------------------<  125<H>  4.8   |  23  |  1.64<H>    Ca    9.2      20 Sep 2022 06:19    D/D:  #Right hip fracture s/p IM nailing 9/15 POD#5  #RAMSEY on Stage 3 CKD - likely ATN in setting of recent surgery much improved  #Rt adductor muscle hematoma stable  #Acute blood loss anemia, Chronic Iron deficiency anemia   #HTN   #HLD    Plan:   - TOV successful;   -Improving Cr and UOP - f/u Nephrology - likely ATN - much improved renal function  - Renal US - bilateral renal cyst  - continue ferrous sulfate 325mg PO  -Pain management following, cont with bowel regimen  -Will give Fleet Enema and Lactulose in addition to Miralax and Senna  -Monitor leukocytosis resolved, no active s/s of infection, likely reactive from fracture and repair  - PT - LYNN; awaiting auth; Patient nnot accepted to [facility] as per CM Keyanna  Accepted to [place] and as per CM Son in agreement  -Incentive spirometry  -Cont BB with holding parameter; slightly elevated HR likely from pain  -DVT ppx    Patient may be discharged with adequate pain control and Bowel regimen  GOC d/w Son by GOPAL Moultoneep; DNR status  See discharge instructions reviewed and updated

## 2022-09-20 NOTE — PROGRESS NOTE ADULT - SUBJECTIVE AND OBJECTIVE BOX
Full note to follow. [facility] NEPHROLOGY- PROGRESS NOTE    Patient is a 89 yo [place] speaking female with h/o HTN, iron def anemia, HLD  presents with rt hip pain s/p fall. Found to have right intratrochanteric fracture s/p OR 9/15 with insertion of locking intramedullary lupe into right hip. Hospital course complicated by RAMSEY. Nephrology consulted for Elevated serum creatinine.    Pt thirsty today    Hospital Medications: Medications reviewed.  REVIEW OF SYSTEMS: no h/a, cp, sob, abd pain, edema.  +rt hip /  Rt knee pain, b/l heel pain      \  VITALS:  T(F): 98.4 (09-20-22 @ 13:15), Max: 99.4 (09-20-22 @ 04:55)  HR: 113 (09-20-22 @ 15:16)  BP: 152/83 (09-20-22 @ 15:16)  RR: 18 (09-20-22 @ 13:15)  SpO2: 96% (09-20-22 @ 15:16)  Wt(kg): --    09-19 @ 07:01  -  09-20 @ 07:00  --------------------------------------------------------  IN: 0 mL / OUT: 750 mL / NET: -750 mL          PHYSICAL EXAM:  Gen: NAD, calm  HEENT: anicteric   Neck: no JVD  Cards: RRR, +S1/S2, no M/G/R  Resp: CTA B/L  GI: soft, NT/ND, NABS  : +valle   Extremities: no LE edema B/L but very tender b/l  Rt hip bandage      LABS:  09-20    139  |  108  |  50<H>  ----------------------------<  125<H>  4.8   |  23  |  1.64<H>    Ca    9.2      20 Sep 2022 06:19      Creatinine Trend: 1.64 <--, 1.96 <--, 2.63 <--, 2.95 <--, 3.00 <--, 2.59 <--, 2.37 <--, 1.77 <--, 1.31 <--, 1.25 <--                        10.7   10.93 )-----------( 272      ( 20 Sep 2022 06:19 )             33.0     Urine Studies:    Sodium, Random Urine: 39 mmol/L (09-17 @ 10:40)  Chloride, Random Urine: 30 mmol/L (09-17 @ 10:40)  Creatinine, Random Urine: 84 mg/dL (09-17 @ 10:40)

## 2022-09-20 NOTE — PROGRESS NOTE ADULT - SUBJECTIVE AND OBJECTIVE BOX
Patient is a 89y old  Female who presents with a chief complaint of Right Hip fracture (20 Sep 2022 08:05)    OVERNIGHT EVENTS: c/o right rip pain with movement, valle d/c'd yesterday, [place] MONTES     REVIEW OF SYSTEMS:  CONSTITUTIONAL: No fever, chills  RESPIRATORY: No cough, SOB  CARDIOVASCULAR: No chest pain, palpitations  GASTROINTESTINAL: No abdominal pain. No nausea, vomiting, or diarrhea  GENITOURINARY: No dysuria  NEUROLOGICAL: No HA  MUSCULOSKELETAL: +ve intermittent right hip pain     T(C): 37.4 (09-20-22 @ 04:55), Max: 37.4 (09-20-22 @ 04:55)  HR: 108 (09-20-22 @ 04:55) (98 - 108)  BP: 146/69 (09-20-22 @ 04:55) (125/63 - 167/62)  RR: 18 (09-20-22 @ 04:55) (18 - 18)  SpO2: 99% (09-20-22 @ 04:55) (99% - 100%)  Wt(kg): --Vital Signs Last 24 Hrs  T(C): 37.4 (20 Sep 2022 04:55), Max: 37.4 (20 Sep 2022 04:55)  T(F): 99.4 (20 Sep 2022 04:55), Max: 99.4 (20 Sep 2022 04:55)  HR: 108 (20 Sep 2022 04:55) (98 - 108)  BP: 146/69 (20 Sep 2022 04:55) (125/63 - 167/62)  BP(mean): --  RR: 18 (20 Sep 2022 04:55) (18 - 18)  SpO2: 99% (20 Sep 2022 04:55) (99% - 100%)    Parameters below as of 20 Sep 2022 04:55  Patient On (Oxygen Delivery Method): room air    MEDICATIONS  (STANDING):  enoxaparin Injectable 30 milliGRAM(s) SubCutaneous every 24 hours  ferrous    sulfate 325 milliGRAM(s) Oral daily  metoprolol tartrate 25 milliGRAM(s) Oral two times a day  polyethylene glycol 3350 17 Gram(s) Oral two times a day  senna 2 Tablet(s) Oral at bedtime  simvastatin 40 milliGRAM(s) Oral at bedtime    MEDICATIONS  (PRN):  metoclopramide Injectable 10 milliGRAM(s) IV Push once PRN Nausea and/or Vomiting  ondansetron Injectable 4 milliGRAM(s) IV Push every 6 hours PRN Nausea and/or Vomiting  oxyCODONE    IR 2.5 milliGRAM(s) Oral every 4 hours PRN Moderate Pain (4 - 6)  oxyCODONE    IR 5 milliGRAM(s) Oral every 4 hours PRN Severe Pain (7 - 10)      PHYSICAL EXAM:  GENERAL: NAD  EYES: clear conjunctiva  ENMT: Moist mucous membranes  NECK: Supple, No JVD  CHEST/LUNG: Clear to auscultation bilaterally; No rales, rhonchi, wheezing, or rubs  HEART: S1, S2, Regular rate and rhythm  ABDOMEN: Soft, Nontender, Nondistended; Bowel sounds present  NEURO: Alert & Oriented X3  EXTREMITIES: No LE edema, no calf tenderness, right leg with limited ROM due to pain   SKIN: right hip with dsg     Consultant(s) Notes Reviewed:  [x ] YES  [ ] NO  Care Discussed with Consultants/Other Providers [ x] YES  [ ] NO    LABS:                        10.7   10.93 )-----------( 272      ( 20 Sep 2022 06:19 )             33.0     09-20    139  |  108  |  50<H>  ----------------------------<  125<H>  4.8   |  23  |  1.64<H>    Ca    9.2      20 Sep 2022 06:19  CAPILLARY BLOOD GLUCOSE  RADIOLOGY & ADDITIONAL TESTS:    < from: US Renal (09.16.22 @ 15:08) >    ACC: [id number] EXAM:  US KIDNEY(S)                          PROCEDURE DATE:  09/16/2022          INTERPRETATION:  CLINICAL INFORMATION: Acute kidney injury. Evaluate for   urinary retention.    COMPARISON: None available.    TECHNIQUE: Sonography ofthe kidneys and bladder.    FINDINGS:  Right kidney: 8.3 cm. No solid renal mass, hydronephrosis or calculi.   There are cysts measuring up to 1.5 cm.    Left kidney: 8.9 cm. No solid renal mass, hydronephrosis or calculi.   There is a 1.3 cm lower pole cyst.    Urinary bladder: Not visualized.    IMPRESSION:  Bilateral renal cysts.    No hydronephrosis bilaterally.      < end of copied text >  < from: CT Pelvis Bony Only No Cont (09.13.22 @ 20:33) >    ACC: [id number] EXAM:  CT PELVIS BONY ONLY                          PROCEDURE DATE:  09/13/2022          INTERPRETATION:  CT PELVIS BONY dated 9/13/2022 8:33 PM    INDICATION: Pain after fall.    COMPARISON: Hip radiographs of the same date    TECHNIQUE: CT imaging of the pelvis was performed. The data was   reformatted in the axial, coronal, and sagittal planes. Additionally, 3-D   reformatted imaging was created.    FINDINGS:    OSSEOUS STRUCTURES: Acute comminuted intratrochanteric fracture with   impaction of the right proximal femur. There is involvement of the   femoral neck and mild extension into the subtrochanteric region. Mild   avulsion of the lesser trochanter. Mild displacement of the fracture   fragments. There are old fracturesof the left superior and inferior   pubic rami without osseous union appreciated. Narrowing the hip joint   spaces. No dislocation. Old left sacral alar fracture. Sclerosis and   degenerative changes of both sacroiliac joints.  SYNOVIUM/ JOINT FLUID:Small right hip effusion.  TENDONS: Calcifications at the proximal hamstring tendons bilaterally.   Suspect partial tearing of the gluteus medius tendon on the right.  MUSCLES: Moderate enlargement of the right adductor muscles in keeping   with intramuscular hematoma.  NEUROVASCULAR STRUCTURES: Vascular calcifications are noted.  INTRAPELVIC SOFT TISSUES: Valle catheter and gas within a nondistended   urinary bladder.  SUBCUTANEOUS SOFT TISSUES: Mild soft tissue swelling about the pelvis.    3-Dreformatted imaging confirms these findings.    IMPRESSION:    1.  Comminuted right intratrochanteric fracture as detailed above.  2.  Moderate right adductor muscle hematoma.  3.  Old fractures of left superior and inferior pubic rami without   osseous healing.  4.  Old left sacral alar fracture deformity.  5.  Degenerative changes.    < end of copied text >  < from: CT Pelvis Bony Only No Cont (09.13.22 @ 20:33) >    ACC: [id number] EXAM:  CT PELVIS BONY ONLY                          PROCEDURE DATE:  09/13/2022          INTERPRETATION:  CT PELVIS BONY dated 9/13/2022 8:33 PM    INDICATION: Pain after fall.    COMPARISON: Hip radiographs of the same date    TECHNIQUE: CT imaging of the pelvis was performed. The data was   reformatted in the axial, coronal, and sagittal planes. Additionally, 3-D   reformatted imaging was created.    FINDINGS:    OSSEOUS STRUCTURES: Acute comminuted intratrochanteric fracture with   impaction of the right proximal femur. There is involvement of the   femoral neck and mild extension into the subtrochanteric region. Mild   avulsion of the lesser trochanter. Mild displacement of the fracture   fragments. There are old fracturesof the left superior and inferior   pubic rami without osseous union appreciated. Narrowing the hip joint   spaces. No dislocation. Old left sacral alar fracture. Sclerosis and   degenerative changes of both sacroiliac joints.  SYNOVIUM/ JOINT FLUID:Small right hip effusion.  TENDONS: Calcifications at the proximal hamstring tendons bilaterally.   Suspect partial tearing of the gluteus medius tendon on the right.  MUSCLES: Moderate enlargement of the right adductor muscles in keeping   with intramuscular hematoma.  NEUROVASCULAR STRUCTURES: Vascular calcifications are noted.  INTRAPELVIC SOFT TISSUES: Valle catheter and gas within a nondistended   urinary bladder.  SUBCUTANEOUS SOFT TISSUES: Mild soft tissue swelling about the pelvis.    3-Dreformatted imaging confirms these findings.    IMPRESSION:    1.  Comminuted right intratrochanteric fracture as detailed above.  2.  Moderate right adductor muscle hematoma.  3.  Old fractures of left superior and inferior pubic rami without   osseous healing.  4.  Old left sacral alar fracture deformity.  5.  Degenerative changes.    < end of copied text >   Patient is a 89y old  Female who presents with a chief complaint of Right Hip fracture (20 Sep 2022 08:05)    OVERNIGHT EVENTS: c/o right rip pain with movement, valle d/c'd yesterday, [place] MONTES     REVIEW OF SYSTEMS:  CONSTITUTIONAL: No fever, chills  RESPIRATORY: No cough, SOB  CARDIOVASCULAR: No chest pain, palpitations  GASTROINTESTINAL: No abdominal pain. No nausea, vomiting, or diarrhea  GENITOURINARY: No dysuria  NEUROLOGICAL: No HA  MUSCULOSKELETAL: +ve intermittent right hip pain     T(C): 37.4 (09-20-22 @ 04:55), Max: 37.4 (09-20-22 @ 04:55)  HR: 108 (09-20-22 @ 04:55) (98 - 108)  BP: 146/69 (09-20-22 @ 04:55) (125/63 - 167/62)  RR: 18 (09-20-22 @ 04:55) (18 - 18)  SpO2: 99% (09-20-22 @ 04:55) (99% - 100%)  Wt(kg): --Vital Signs Last 24 Hrs  T(C): 37.4 (20 Sep 2022 04:55), Max: 37.4 (20 Sep 2022 04:55)  T(F): 99.4 (20 Sep 2022 04:55), Max: 99.4 (20 Sep 2022 04:55)  HR: 108 (20 Sep 2022 04:55) (98 - 108)  BP: 146/69 (20 Sep 2022 04:55) (125/63 - 167/62)  BP(mean): --  RR: 18 (20 Sep 2022 04:55) (18 - 18)  SpO2: 99% (20 Sep 2022 04:55) (99% - 100%)    Parameters below as of 20 Sep 2022 04:55  Patient On (Oxygen Delivery Method): room air    MEDICATIONS  (STANDING):  enoxaparin Injectable 30 milliGRAM(s) SubCutaneous every 24 hours  ferrous    sulfate 325 milliGRAM(s) Oral daily  metoprolol tartrate 25 milliGRAM(s) Oral two times a day  polyethylene glycol 3350 17 Gram(s) Oral two times a day  senna 2 Tablet(s) Oral at bedtime  simvastatin 40 milliGRAM(s) Oral at bedtime    MEDICATIONS  (PRN):  metoclopramide Injectable 10 milliGRAM(s) IV Push once PRN Nausea and/or Vomiting  ondansetron Injectable 4 milliGRAM(s) IV Push every 6 hours PRN Nausea and/or Vomiting  oxyCODONE    IR 2.5 milliGRAM(s) Oral every 4 hours PRN Moderate Pain (4 - 6)  oxyCODONE    IR 5 milliGRAM(s) Oral every 4 hours PRN Severe Pain (7 - 10)      PHYSICAL EXAM:  GENERAL: NAD  EYES: clear conjunctiva  ENMT: Moist mucous membranes  NECK: Supple, No JVD  CHEST/LUNG: Clear to auscultation bilaterally; No rales, rhonchi, wheezing, or rubs  HEART: S1, S2, Regular rate and rhythm  ABDOMEN: Soft, Nontender, Nondistended; Bowel sounds present  NEURO: Alert & Oriented X3  EXTREMITIES: No LE edema, no calf tenderness, right leg with limited ROM due to pain   SKIN: right hip with dsg     Consultant(s) Notes Reviewed:  [x ] YES  [ ] NO  Care Discussed with Consultants/Other Providers [ x] YES  [ ] NO    LABS:                        10.7   10.93 )-----------( 272      ( 20 Sep 2022 06:19 )             33.0     09-20    139  |  108  |  50<H>  ----------------------------<  125<H>  4.8   |  23  |  1.64<H>    Ca    9.2      20 Sep 2022 06:19  CAPILLARY BLOOD GLUCOSE  RADIOLOGY & ADDITIONAL TESTS:    < from: US Renal (09.16.22 @ 15:08) >    ACC: [id number] EXAM:  US KIDNEY(S)                          PROCEDURE DATE:  09/16/2022          INTERPRETATION:  CLINICAL INFORMATION: Acute kidney injury. Evaluate for   urinary retention.    COMPARISON: None available.    TECHNIQUE: Sonography ofthe kidneys and bladder.    FINDINGS:  Right kidney: 8.3 cm. No solid renal mass, hydronephrosis or calculi.   There are cysts measuring up to 1.5 cm.    Left kidney: 8.9 cm. No solid renal mass, hydronephrosis or calculi.   There is a 1.3 cm lower pole cyst.    Urinary bladder: Not visualized.    IMPRESSION:  Bilateral renal cysts.    No hydronephrosis bilaterally.      < end of copied text >  < from: CT Pelvis Bony Only No Cont (09.13.22 @ 20:33) >    ACC: [id number] EXAM:  CT PELVIS BONY ONLY                          PROCEDURE DATE:  09/13/2022          INTERPRETATION:  CT PELVIS BONY dated 9/13/2022 8:33 PM    INDICATION: Pain after fall.    COMPARISON: Hip radiographs of the same date    TECHNIQUE: CT imaging of the pelvis was performed. The data was   reformatted in the axial, coronal, and sagittal planes. Additionally, 3-D   reformatted imaging was created.    FINDINGS:    OSSEOUS STRUCTURES: Acute comminuted intratrochanteric fracture with   impaction of the right proximal femur. There is involvement of the   femoral neck and mild extension into the subtrochanteric region. Mild   avulsion of the lesser trochanter. Mild displacement of the fracture   fragments. There are old fracturesof the left superior and inferior   pubic rami without osseous union appreciated. Narrowing the hip joint   spaces. No dislocation. Old left sacral alar fracture. Sclerosis and   degenerative changes of both sacroiliac joints.  SYNOVIUM/ JOINT FLUID:Small right hip effusion.  TENDONS: Calcifications at the proximal hamstring tendons bilaterally.   Suspect partial tearing of the gluteus medius tendon on the right.  MUSCLES: Moderate enlargement of the right adductor muscles in keeping   with intramuscular hematoma.  NEUROVASCULAR STRUCTURES: Vascular calcifications are noted.  INTRAPELVIC SOFT TISSUES: Valle catheter and gas within a nondistended   urinary bladder.  SUBCUTANEOUS SOFT TISSUES: Mild soft tissue swelling about the pelvis.    3-Dreformatted imaging confirms these findings.    IMPRESSION:    1.  Comminuted right intratrochanteric fracture as detailed above.  2.  Moderate right adductor muscle hematoma.  3.  Old fractures of left superior and inferior pubic rami without   osseous healing.  4.  Old left sacral alar fracture deformity.  5.  Degenerative changes.    < end of copied text >

## 2022-09-20 NOTE — DISCHARGE NOTE PROVIDER - HOSPITAL COURSE
89 y/o female with PMHx of HTN, HLD presented with right hip pain, sp fall backwards at home. In ED:  Xray showed right displaced hip fracture, CXR with clear lungs. and Creatinine  1.3 (similar to creatinine from 2 months ago). Found to be positive for COVID:  asymptomatic-- prior COVID positive test in July. patient admitted for right intertrochanteric femoral fracture with CT pelvis confirmed for Comminuted right intratrochanteric fracture as detailed above. Moderate right adductor muscle hematoma. s/p OR 9/15 POD, R hip IM nailing. hospital course complicated by decreased urine output with no urine noted on bladder scan. bolus given and Guardado catheter inserted with minimal output. noted with up trending creatinine. Nephro Dr. cannon consulted. Also found to have low hg, s/p 1 PRBC.  Renal US with renal cysts and no acute findings, renal function slowly improving  PT recommended LYNN  Clinically improving, optimized for discharge with outpt follow up   Please note that this a brief summary of hospital course please refer to daily progress notes and consult notes for full course and events

## 2022-09-20 NOTE — PROGRESS NOTE ADULT - PROBLEM SELECTOR PLAN 1
-s/p fall with right intratrochanteric fracture   -s/p IM nailing of right hip 9/15  -WBAT on RLE   -cont pain mgmt   -PT recommended LYNN   -Ortho following

## 2022-09-20 NOTE — DISCHARGE NOTE PROVIDER - CARE PROVIDER_API CALL
Nathaniel Encinas)  Orthopaedic Surgery  [address] [place] [address]  Phone: [phone]  Fax: [phone]  Follow Up Time: 2 weeks    NORMA RODRIGUES  Internal Medicine  [address] [place] [address]  Phone: [phone]  Fax: [phone]  Follow Up Time: 1 week

## 2022-09-20 NOTE — CHART NOTE - NSCHARTNOTEFT_GEN_A_CORE
EVENT: Notified by nurse regarding patient with little or no urinary output     BRIEF HPI: 89 y/o female with PMHx of HTN, HLD presented with right hip pain, sp fall  backwards at home.   In ED:  Xray showed right displaced hip fracture. Admitted for right intertrochanteric femoral fracture. S/P  intramedullary lupe into right hip on 9/14 POD #1.    Patient noted with little or no urinary output post surg, Guardado catheter in placed, IVF infusing at 100 ml/hr. Bladder scan zero. Patient denies any abdominal discomfort, abdominal distention, incontinence, sensation of bladder fullness or urgency.  Guardado catheter assessed for patency and kinking.      OBJECTIVE:  Vital Signs Last 24 Hrs  T(C): 36.7 (15 Sep 2022 23:47), Max: 37.4 (15 Sep 2022 00:22)  T(F): 98 (15 Sep 2022 23:47), Max: 99.3 (15 Sep 2022 00:22)  HR: 98 (15 Sep 2022 23:47) (89 - 113)  BP: 112/69 (15 Sep 2022 23:47) (112/69 - 172/65)  BP(mean): 63 (15 Sep 2022 20:07) (63 - 83)  RR: 18 (15 Sep 2022 23:47) (16 - 20)  SpO2: 96% (15 Sep 2022 23:47) (96% - 100%)    Parameters below as of 15 Sep 2022 23:47  Patient On (Oxygen Delivery Method): nasal cannula  O2 Flow (L/min): 2    MEDS  acetaminophen     Tablet .. 1000 milliGRAM(s) Oral every 8 hours  ceFAZolin   IVPB 2000 milliGRAM(s) IV Intermittent every 8 hours  enoxaparin Injectable 30 milliGRAM(s) SubCutaneous every 24 hours  magnesium hydroxide Suspension 30 milliLiter(s) Oral daily PRN  metoclopramide Injectable 10 milliGRAM(s) IV Push once PRN  metoprolol tartrate 25 milliGRAM(s) Oral two times a day  ondansetron Injectable 4 milliGRAM(s) IV Push every 6 hours PRN  oxyCODONE    IR 2.5 milliGRAM(s) Oral every 4 hours PRN  oxyCODONE    IR 5 milliGRAM(s) Oral every 4 hours PRN  polyethylene glycol 3350 17 Gram(s) Oral daily  senna 2 Tablet(s) Oral at bedtime  simvastatin 40 milliGRAM(s) Oral at bedtime  sodium chloride 0.9% Bolus 500 milliLiter(s) IV Bolus once  sodium chloride 0.9%. 1000 milliLiter(s) IV Continuous <Continuous>      FOCUSED PHYSICAL EXAM:  GENERAL:  Alert & Oriented X4  RESPIRATORY: Respirations even and unlabored. Clear to auscultation bilaterally; No rales, rhonchi, wheezing.   CARDIOVASCULAR: Normal S1/S2, regular rate and rhythm. No JVD.   GASTROINTESTINAL:  Soft, Nontender, Nondistended; Bowel sounds present  GENITOURINARY: Urinary catheter in situ    LABS:                        10.6   22.06 )-----------( 195      ( 15 Sep 2022 13:50 )             32.5     09-15    137  |  100  |  30<H>  ----------------------------<  166<H>  4.4   |  26  |  1.77<H>    Ca    8.6      15 Sep 2022 13:50  Phos  3.8     09-14  Mg     2.7     09-14    TPro  7.4  /  Alb  3.3<L>  /  TBili  0.5  /  DBili  x   /  AST  19  /  ALT  16  /  AlkPhos  82  09-14    BLADDER SCAN: zerp    PROBLEM: Urinary retention - likely post-operative urinary retention  PLAN:   Bladder scan  Normal saline bolus 500 cc x 1  Continue IVF  Encourage PO fluid intake    Will consider starting flomax  Monitor I&O  FOLLOW UP / RESULT: Reassess for effectiveness of above intervention
Received call from RN, ambulance came to pick pt up however, pt noted with elevated BP, SBP 170s, pt asymptomatic. Per RN, and pt, she did not receive her 1800 Metoprolol.     -Metoprolol 25 mg PO x 1 dose  -Recheck BP   -If improvement in BP, can be discharged to [facility]
EVENT: Received telephone call from RN that pt ate very little and is not hydrating herself & has very low urinary output    BRIEF HPI: 89 y/o female with PMHx of HTN, HLD presented with right hip pain, sp fall  backwards at home.   In ED:  Xray showed right displaced hip fracture. Admitted for right intertrochanteric femoral fracture. S/P  intramedullary lupe into right hip on 9/14     SUBJECTIVE: "I'm not hungry or thirsty"    OBJECTIVE:  Vital Signs Last 24 Hrs  T(C): 36.9 (16 Sep 2022 21:10), Max: 37 (16 Sep 2022 13:09)  T(F): 98.5 (16 Sep 2022 21:10), Max: 98.6 (16 Sep 2022 13:09)  HR: 94 (16 Sep 2022 21:10) (82 - 99)  BP: 122/46 (16 Sep 2022 21:10) (97/58 - 127/55)  BP(mean): --  RR: 17 (16 Sep 2022 21:10) (16 - 18)  SpO2: 97% (16 Sep 2022 21:10) (96% - 100%)    Parameters below as of 16 Sep 2022 21:10  Patient On (Oxygen Delivery Method): nasal cannula  O2 Flow (L/min): 2      FOCUSED PHYSICAL EXAM:  Neuro: awake, alert, oriented x 3. No neuro deficit  Cardiovascular: Pulses +2 B/L in lower and upper extremities, HR regular, BP stable, No edema.  Respiratory: Respirations regular, unlabored, breath sounds clear B/L.   GI: Abdomen soft, non-tender, positive bowel sounds.  : no bladder distention noted. No complaints at this time.  Skin: Dry, intact, no bruising, no diaphoresis.    LABS:                        7.8    11.71 )-----------( 132      ( 16 Sep 2022 03:40 )             24.2     09-16    137  |  103  |  42<H>  ----------------------------<  144<H>  4.3   |  24  |  2.59<H>    Ca    7.4<L>      16 Sep 2022 03:40        EKG:   IMAGING:      ASSESSMENT/PROBLEM: Low urinary output (Guardado catheter in placed, IVF infusing at 100 ml/hr. Bladder scan zero)      PLAN:   1. Renew IVF, NS at 100cc/hr  2. continue to monitor urinary output  3. Cont present care/treatment  4. Supportive care

## 2022-09-20 NOTE — PROGRESS NOTE ADULT - PROBLEM SELECTOR PLAN 1
Pt with acute right hip post-operative pain which is somatic and neuropathic in nature due to right hip fracture, s/p Insertion of locking intramedullary lupe into right hip on 9/14 POD #5. GOHCO.   High risk medications reviewed. Avoid polypharmacy. Avoid IV opioids once postop. Avoid NSAIDs and benzodiazepines. Non-pharmacological sleep aides initiated. Non-opioid medications and non-pharmacological pain management measures initiated.   Opioid pain recommendations   - Continue Oxycodone 2.5/5 mg PO q 4 hours PRN moderate/severe pain. Monitor for sedation/ respiratory depression.   Non-opioid pain recommendations   - Acetaminophen 1 gram PO q 8 hours x 4 days.   Bowel Regimen  - Miralax 17G PO daily  - Senna 2 tablets at bedtime for constipation                                                                                                          Mild pain   - Non-pharmacological pain treatment recommendations  - Warm/ Cool packs PRN   - Repositioning extremity, guided imagery, relaxation, distraction.  - Physical therapy OOB if no contraindications once postop  Recommendations discussed with primary team and RN.

## 2022-09-20 NOTE — PROGRESS NOTE ADULT - SUBJECTIVE AND OBJECTIVE BOX
89yFemale    Diagnosis:  S/p R Hip IM Nailing POD#5    Patient was seen and evaluated at bedside. Patient with no acute complaints.   Pain is well controlled.  Denies CP/SOB, dyspnea, paresthesias, N/V/D, palpitations.     Vital Signs Last 24 Hrs  T(C): 37.4 (20 Sep 2022 04:55), Max: 37.4 (20 Sep 2022 04:55)  T(F): 99.4 (20 Sep 2022 04:55), Max: 99.4 (20 Sep 2022 04:55)  HR: 108 (20 Sep 2022 04:55) (98 - 108)  BP: 146/69 (20 Sep 2022 04:55) (125/63 - 167/62)  BP(mean): --  RR: 18 (20 Sep 2022 04:55) (18 - 18)  SpO2: 99% (20 Sep 2022 04:55) (99% - 100%)    Parameters below as of 20 Sep 2022 04:55  Patient On (Oxygen Delivery Method): room air      I&O's Detail    19 Sep 2022 07:01  -  20 Sep 2022 07:00  --------------------------------------------------------  IN:  Total IN: 0 mL    OUT:    Indwelling Catheter - Urethral (mL): 750 mL  Total OUT: 750 mL    Total NET: -750 mL          Physical Exam:      R Hip:  Dressing is C/D/I. Skin is pink and warm. No erythema. SILT.  Wound with no drainage, healing well.   Lower extremity:  No calf tenderness, calves are soft. 2+pulses. NVI. 5/5 Strength of EHL/TA/gastrocnemius B/L.  Good capillary refill. Warm, well-perfused.                           10.7   10.93 )-----------( 272      ( 20 Sep 2022 06:19 )             33.0     09-20    139  |  108  |  50<H>  ----------------------------<  125<H>  4.8   |  23  |  1.64<H>    Ca    9.2      20 Sep 2022 06:19        Impression:  89yFemale S/p R Hip IM Nailing POD#5  Plan:  -  Pain management  -  Dvt prophylaxis with Lovenox  -  Daily Physical Theapy:  WBAT of right lower extremity  -  Discharge planning: Rehab

## 2022-09-20 NOTE — PROGRESS NOTE ADULT - SUBJECTIVE AND OBJECTIVE BOX
Source of information: KALPANA DAVIDSON, Chart review  Patient language: English  : n/a    HPI:  Patient is a 89 yo Liberian speaking female (accompanied by her son Isac, who provides translation) with PMH HTN, anemia requiring Iron transfusions (last one before COVID), HLD BIBEMS to the ED several hours after a witnessed fall. According to patient, she tripped and fell backwards in her kitchen and home health aide caught the patient's back before it hit the ground patient landed on her R buttock but without striking her head. Patient denies any dizziness, weakness, visual or neurological symptoms before falling. Patient reports 8/10 sharp, throbbing pain on the right hip. Pain is non radiating and worsened with movement but made better with the morphine. Patient denies any syncope. Pt denies any CP, SOB, abdominal pain, nausea vomiting or changes in bowel habits. Patient denies fevers or chills. At baseline, patient can perform ADLs, walks with no devices, and can walk on a flat surface and incline with no symptoms.   (13 Sep 2022 17:27)      This is a Patient is a 89y old  Female who presents with a chief complaint of Right Hip fracture (20 Sep 2022 10:39)  . Pt diagnosed with ... Pain consulted for ...Pt seen and examined at bedside. Pt reports PAIN SCORE:  *** SCALE USED: (1-10 VNRS). Pain adequately managed on current pain regimen. Pt describes pain as .... radiating to... alleviated by pain medications... exacerbated by movement.... Pt tolerating PO diet. Pt denies lethargy, nausea, vomiting, constipation and itchiness. Reports last BM ***. Patient stated goal for pain control: to be able to take deep breaths, get out of bed to chair and ambulate with tolerable pain control.     PAST MEDICAL & SURGICAL HISTORY:  Hyperlipidemia      Arthritis      Anemia      Neuropathy      Hypertension      History of Cholecystectomy      Gall Bladder Disease  Cholecystectomy  1994      Hip fracture  s/p surgery in june/2015 -- pt&#x27;s son denies?          FAMILY HISTORY:  No pertinent family history in first degree relatives        Social History:  Patient lives with daughter, at baseline patient can walk on a flat surface without shortness of breath, can perform ADLS. Patient denies illicit drug use, ETOH or tobacco use. (13 Sep 2022 17:27)   [ ]Denies ETOH use, illicit drug use, and smoking     Allergies    No Known Allergies    Intolerances        MEDICATIONS  (STANDING):  enoxaparin Injectable 30 milliGRAM(s) SubCutaneous every 24 hours  ferrous    sulfate 325 milliGRAM(s) Oral daily  gabapentin 100 milliGRAM(s) Oral two times a day  metoprolol tartrate 25 milliGRAM(s) Oral two times a day  polyethylene glycol 3350 17 Gram(s) Oral two times a day  senna 2 Tablet(s) Oral at bedtime  simvastatin 40 milliGRAM(s) Oral at bedtime    MEDICATIONS  (PRN):  metoclopramide Injectable 10 milliGRAM(s) IV Push once PRN Nausea and/or Vomiting  ondansetron Injectable 4 milliGRAM(s) IV Push every 6 hours PRN Nausea and/or Vomiting  oxyCODONE    IR 2.5 milliGRAM(s) Oral every 4 hours PRN Moderate Pain (4 - 6)  oxyCODONE    IR 5 milliGRAM(s) Oral every 4 hours PRN Severe Pain (7 - 10)      Vital Signs Last 24 Hrs  T(C): 36.9 (20 Sep 2022 13:15), Max: 37.4 (20 Sep 2022 04:55)  T(F): 98.4 (20 Sep 2022 13:15), Max: 99.4 (20 Sep 2022 04:55)  HR: 113 (20 Sep 2022 15:16) (94 - 113)  BP: 152/83 (20 Sep 2022 15:16) (146/69 - 169/62)  BP(mean): --  RR: 18 (20 Sep 2022 13:15) (18 - 18)  SpO2: 96% (20 Sep 2022 15:16) (96% - 100%)    Parameters below as of 20 Sep 2022 15:16  Patient On (Oxygen Delivery Method): room air      COVID-19 PCR: Detected (18 Sep 2022 19:08)  COVID-19 PCR: Detected (13 Sep 2022 16:20)  COVID-19 PCR: Detected (20 Jul 2022 10:07)    LABS: Reviewed                          10.7   10.93 )-----------( 272      ( 20 Sep 2022 06:19 )             33.0     09-20    139  |  108  |  50<H>  ----------------------------<  125<H>  4.8   |  23  |  1.64<H>    Ca    9.2      20 Sep 2022 06:19            CAPILLARY BLOOD GLUCOSE        COVID-19 PCR: Detected (18 Sep 2022 19:08)  COVID-19 PCR: Detected (13 Sep 2022 16:20)  COVID-19 PCR: Detected (20 Jul 2022 10:07)      Radiology: Reviewed    ORT Score -   Family Hx of substance abuse	Female	      Male  Alcohol 	                                           1                     3  Illegal drugs	                                   2                     3  Rx drugs                                           4 	                  4  Personal Hx of substance abuse		  Alcohol 	                                          3	                  3  Illegal drugs                                     4	                  4  Rx drugs                                            5 	                  5  Age between 16- 45 years	           1                     1  hx preadolescent sexual abuse	   3 	                  0  Psychological disease		  ADD, OCD, bipolar, schizophrenia   2	          2  Depression                                           1 	          1  Total: 0    a score of 3 or lower indicates low risk for opioid abuse		  a score of 4-7 indicates moderate risk for opioid abuse		  a score of 8 or higher indicates high risk for opioid abuse    REVIEW OF SYSTEMS:  CONSTITUTIONAL: No fever or fatigue  HEENT:  No difficulty hearing, no change in vision  NECK: No pain or stiffness  RESPIRATORY: No cough, wheezing, chills or hemoptysis; No shortness of breath  CARDIOVASCULAR: No chest pain, palpitations, dizziness, or leg swelling  GASTROINTESTINAL: No loss of appetite, decreased PO intake. No abdominal or epigastric pain. No nausea, vomiting; No diarrhea or constipation.   GENITOURINARY: No dysuria, frequency, hematuria, retention or incontinence  MUSCULOSKELETAL: No joint pain or swelling; No muscle, back, or extremity pain, no upper or lower motor strength weakness, no saddle anesthesia, bowel/bladder incontinence, no falls   NEURO: No headaches, No numbness/tingling b/l LE, No weakness  ENDOCRINE: No polyuria, polydipsia, heat or cold intolerance; No hair loss  PSYCHIATRIC: No depression, anxiety or difficulty sleeping    PHYSICAL EXAM:  GENERAL:  Alert & Oriented X4, cooperative, NAD, Good concentration. Speech is clear.   RESPIRATORY: Respirations even and unlabored. Clear to auscultation bilaterally; No rales, rhonchi, wheezing, or rubs  CARDIOVASCULAR: Normal S1/S2, regular rate and rhythm; No murmurs, rubs, or gallops. No JVD.   GASTROINTESTINAL:  Soft, Nontender, Nondistended; Bowel sounds present  PERIPHERAL VASCULAR:  Extremities warm without edema. 2+ Peripheral Pulses, No cyanosis, No calf tenderness  MUSCULOSKELETAL: Motor Strength 5/5 B/L upper and lower extremities; moves all extremities equally against gravity; ROM intact; negative SLR; No tenderness on palpation of all joints.   SKIN: Warm, dry, intact. No rashes, lesions, scars or wounds.     Risk factors associated with adverse outcomes related to opioid treatment  [ ]  Concurrent benzodiazepine use  [ ]  History/ Active substance use or alcohol use disorder  [ ] Psychiatric co-morbidity  [ ] Sleep apnea  [ ] COPD  [ ] BMI> 35  [ ] Liver dysfunction  [ ] Renal dysfunction  [ ] CHF  [ ] Smoker  [ ]  Age > 60 years    [ ]  [place]  Reviewed and Copied to Chart. See below.    Plan of care and goal oriented pain management treatment options were discussed with patient and /or primary care giver; all questions and concerns were addressed and care was aligned with patient's wishes.    Educated patient on goal oriented pain management treatment options     09-20-22 @ 15:42     Source of information: KALPANA DAVIDSON, Chart review  Patient language: English  : n/a    HPI:  Patient is a 89 yo Kyrgyz speaking female (accompanied by her son Isac, who provides translation) with PMH HTN, anemia requiring Iron transfusions (last one before COVID), HLD BIBEMS to the ED several hours after a witnessed fall. According to patient, she tripped and fell backwards in her kitchen and home health aide caught the patient's back before it hit the ground patient landed on her R buttock but without striking her head. Patient denies any dizziness, weakness, visual or neurological symptoms before falling. Patient reports 8/10 sharp, throbbing pain on the right hip. Pain is non radiating and worsened with movement but made better with the morphine. Patient denies any syncope. Pt denies any CP, SOB, abdominal pain, nausea vomiting or changes in bowel habits. Patient denies fevers or chills. At baseline, patient can perform ADLs, walks with no devices, and can walk on a flat surface and incline with no symptoms.   (13 Sep 2022 17:27)    Pt with an acute right hip fracture s/p Insertion of locking intramedullary lupe into right hip on 9/14 POD #5. GOHCO. Pain consulted for right hip pain. Pt seen and examined at bedside this morning. Patient found lying in bed, observed resting, no acute distress noted. Patient Kyrgyz speaking, but able to understand English, able to answer questions. Patient reports right hip pain 4/10, tolerable SCALE USED: (1-10 VNRS).. Pain is localized to the right hip, non-radiating, exacerbated by movement. Patient adequately managed on current pain regimen. Pt tolerating PO diet. Pt denies lethargy, nausea, vomiting, constipation and itchiness. Reports last BM today 9/20. Patient stated goal for pain control: to be able to take deep breaths, get out of bed to chair and ambulate with tolerable pain control.     PAST MEDICAL & SURGICAL HISTORY:  Hyperlipidemia    Arthritis    Anemia    Neuropathy    Hypertension    History of Cholecystectomy    Gall Bladder Disease  Cholecystectomy  1994    Hip fracture  s/p surgery in june/2015 -- pt&#x27;s son denies?    FAMILY HISTORY:  No pertinent family history in first degree relatives    Social History:  Patient lives with daughter, at baseline patient can walk on a flat surface without shortness of breath, can perform ADLS. Patient denies illicit drug use, ETOH or tobacco use. (13 Sep 2022 17:27)   [x]Denies ETOH use, illicit drug use, and smoking     Allergies    No Known Allergies    Intolerances    MEDICATIONS  (STANDING):  enoxaparin Injectable 30 milliGRAM(s) SubCutaneous every 24 hours  ferrous    sulfate 325 milliGRAM(s) Oral daily  gabapentin 100 milliGRAM(s) Oral two times a day  metoprolol tartrate 25 milliGRAM(s) Oral two times a day  polyethylene glycol 3350 17 Gram(s) Oral two times a day  senna 2 Tablet(s) Oral at bedtime  simvastatin 40 milliGRAM(s) Oral at bedtime    MEDICATIONS  (PRN):  metoclopramide Injectable 10 milliGRAM(s) IV Push once PRN Nausea and/or Vomiting  ondansetron Injectable 4 milliGRAM(s) IV Push every 6 hours PRN Nausea and/or Vomiting  oxyCODONE    IR 2.5 milliGRAM(s) Oral every 4 hours PRN Moderate Pain (4 - 6)  oxyCODONE    IR 5 milliGRAM(s) Oral every 4 hours PRN Severe Pain (7 - 10)    Vital Signs Last 24 Hrs  T(C): 36.9 (20 Sep 2022 13:15), Max: 37.4 (20 Sep 2022 04:55)  T(F): 98.4 (20 Sep 2022 13:15), Max: 99.4 (20 Sep 2022 04:55)  HR: 113 (20 Sep 2022 15:16) (94 - 113)  BP: 152/83 (20 Sep 2022 15:16) (146/69 - 169/62)  BP(mean): --  RR: 18 (20 Sep 2022 13:15) (18 - 18)  SpO2: 96% (20 Sep 2022 15:16) (96% - 100%)    Parameters below as of 20 Sep 2022 15:16  Patient On (Oxygen Delivery Method): room air    COVID-19 PCR: Detected (18 Sep 2022 19:08)  COVID-19 PCR: Detected (13 Sep 2022 16:20)  COVID-19 PCR: Detected (20 Jul 2022 10:07)    LABS: Reviewed                        10.7   10.93 )-----------( 272      ( 20 Sep 2022 06:19 )             33.0     09-20    139  |  108  |  50<H>  ----------------------------<  125<H>  4.8   |  23  |  1.64<H>    Ca    9.2      20 Sep 2022 06:19    CAPILLARY BLOOD GLUCOSE    COVID-19 PCR: Detected (18 Sep 2022 19:08)  COVID-19 PCR: Detected (13 Sep 2022 16:20)  COVID-19 PCR: Detected (20 Jul 2022 10:07)    Radiology: Reviewed    ACC: [id number] EXAM:  XR FEMUR 2 VIEWS RT                          PROCEDURE DATE:  09/13/2022      INTERPRETATION:  Clinical history: 89-year-old female, evaluate distal   femur.    Four views of the right femur are correlated with a concurrentpelvic CT   which better characterizes the comminuted right intertrochanteric   fracture.    No acute findings in the distal femur in particular no fracture or   suprapatellar effusion.    IMPRESSION:  No acute findings in the distal femur    --- Endof Report ---    EDY BUNCH DO; Attending Radiologist  This document has been electronically signed. Sep 15 2022 12:25PM    < from: CT Pelvis Bony Only No Cont (09.13.22 @ 20:33) >    ACC: [id number] EXAM:  CT PELVIS BONY ONLY                          PROCEDURE DATE:  09/13/2022      INTERPRETATION:  CT PELVIS BONY dated 9/13/2022 8:33 PM    INDICATION: Pain after fall.    COMPARISON: Hip radiographs of the same date    TECHNIQUE: CT imaging of the pelvis was performed. The data was   reformatted in the axial, coronal, and sagittal planes. Additionally, 3-D   reformatted imaging was created.    FINDINGS:    OSSEOUS STRUCTURES: Acute comminuted intratrochanteric fracture with   impaction of the right proximal femur. There is involvement of the   femoral neck and mild extension into the subtrochanteric region. Mild   avulsion of the lesser trochanter. Mild displacement of the fracture   fragments. There are old fracturesof the left superior and inferior   pubic rami without osseous union appreciated. Narrowing the hip joint   spaces. No dislocation. Old left sacral alar fracture. Sclerosis and   degenerative changes of both sacroiliac joints.  SYNOVIUM/ JOINT FLUID:Small right hip effusion.  TENDONS: Calcifications at the proximal hamstring tendons bilaterally.   Suspect partial tearing of the gluteus medius tendon on the right.  MUSCLES: Moderate enlargement of the right adductor muscles in keeping   with intramuscular hematoma.  NEUROVASCULAR STRUCTURES: Vascular calcifications are noted.  INTRAPELVIC SOFT TISSUES: Guardado catheter and gas within a nondistended   urinary bladder.  SUBCUTANEOUS SOFT TISSUES: Mild soft tissue swelling about the pelvis.    3-Dreformatted imaging confirms these findings.    IMPRESSION:    1.  Comminuted right intratrochanteric fracture as detailed above.  2.  Moderate right adductor muscle hematoma.  3.  Old fractures of left superior and inferior pubic rami without   osseous healing.  4.  Old left sacral alar fracture deformity.  5.  Degenerative changes.    --- End of Report ---    JANA NICHOLAS MD; Attending Radiologist  This document has been electronically signed. Sep 13 2022  8:56PM    ORT Score -   Family Hx of substance abuse	Female	      Male  Alcohol 	                                           1                     3  Illegal drugs	                                   2                     3  Rx drugs                                           4 	                  4  Personal Hx of substance abuse		  Alcohol 	                                          3	                  3  Illegal drugs                                     4	                  4  Rx drugs                                            5 	                  5  Age between 16- 45 years	           1                     1  hx preadolescent sexual abuse	   3 	                  0  Psychological disease		  ADD, OCD, bipolar, schizophrenia   2	          2  Depression                                           1 	          1  Total: 0    a score of 3 or lower indicates low risk for opioid abuse		  a score of 4-7 indicates moderate risk for opioid abuse		  a score of 8 or higher indicates high risk for opioid abuse    REVIEW OF SYSTEMS:  CONSTITUTIONAL: No fever or fatigue  HEENT:  No difficulty hearing, no change in vision  NECK: No pain or stiffness  RESPIRATORY: No cough, wheezing, chills or hemoptysis; No shortness of breath  CARDIOVASCULAR: No chest pain, palpitations, dizziness, or leg swelling  GASTROINTESTINAL: No loss of appetite, decreased PO intake. No abdominal or epigastric pain. No nausea, vomiting; No diarrhea or constipation.   GENITOURINARY: No dysuria, frequency, hematuria, retention or incontinence  MUSCULOSKELETAL: + occasional right hip joint pain, +swelling; No back pain, no upper or lower motor strength weakness, no saddle anesthesia, bowel/bladder incontinence, + falls   NEURO: No headaches, No numbness/tingling b/l LE, No weakness  ENDOCRINE: No polyuria, polydipsia, heat or cold intolerance; No hair loss  PSYCHIATRIC: No depression, anxiety or difficulty sleeping    PHYSICAL EXAM:  GENERAL:  Alert & Oriented X3, cooperative, NAD. Speech is clear.   RESPIRATORY: Respirations even and unlabored. Clear to auscultation bilaterally; No rales, rhonchi, wheezing, or rubs  CARDIOVASCULAR: Normal S1/S2, regular rate and rhythm; No murmurs, rubs, or gallops. No JVD.   GASTROINTESTINAL:  Soft, Nontender, Nondistended; Bowel sounds present  PERIPHERAL VASCULAR:  Extremities warm without edema. 2+ Peripheral Pulses, No cyanosis, No calf tenderness  MUSCULOSKELETAL: Motor Strength 5/5 B/L upper and 3/5  lower extremities; + decreased ROM of right hip; + right hip tenderness on palpation   SKIN: + right hip surgical dressing c/d/i; Warm, dry, intact. No rashes, lesions, scars.    Risk factors associated with adverse outcomes related to opioid treatment  [ ]  Concurrent benzodiazepine use  [ ]  History/ Active substance use or alcohol use disorder  [ ] Psychiatric co-morbidity  [ ] Sleep apnea  [ ] COPD  [ ] BMI> 35  [ ] Liver dysfunction  [ ] Renal dysfunction  [ ] CHF  [ ] Smoker  [x]  Age > 60 years    [x]  [place]  Reviewed and Copied to Chart. See below.    Plan of care and goal oriented pain management treatment options were discussed with patient and /or primary care giver; all questions and concerns were addressed and care was aligned with patient's wishes.    Educated patient on goal oriented pain management treatment options     09-20-22 @ 15:42

## 2022-09-24 ENCOUNTER — INPATIENT (INPATIENT)
Facility: HOSPITAL | Age: 87
LOS: 6 days | Discharge: NURSING, REHAB OR LONG-TERM CARE | DRG: 682 | End: 2022-10-01
Attending: STUDENT IN AN ORGANIZED HEALTH CARE EDUCATION/TRAINING PROGRAM | Admitting: STUDENT IN AN ORGANIZED HEALTH CARE EDUCATION/TRAINING PROGRAM
Payer: COMMERCIAL

## 2022-09-24 VITALS
SYSTOLIC BLOOD PRESSURE: 126 MMHG | HEIGHT: 64 IN | WEIGHT: 179.9 LBS | HEART RATE: 86 BPM | RESPIRATION RATE: 18 BRPM | OXYGEN SATURATION: 100 % | TEMPERATURE: 98 F | DIASTOLIC BLOOD PRESSURE: 69 MMHG

## 2022-09-24 DIAGNOSIS — E78.5 HYPERLIPIDEMIA, UNSPECIFIED: ICD-10-CM

## 2022-09-24 DIAGNOSIS — N17.9 ACUTE KIDNEY FAILURE, UNSPECIFIED: ICD-10-CM

## 2022-09-24 DIAGNOSIS — S72.001A FRACTURE OF UNSPECIFIED PART OF NECK OF RIGHT FEMUR, INITIAL ENCOUNTER FOR CLOSED FRACTURE: ICD-10-CM

## 2022-09-24 DIAGNOSIS — Z29.9 ENCOUNTER FOR PROPHYLACTIC MEASURES, UNSPECIFIED: ICD-10-CM

## 2022-09-24 DIAGNOSIS — I10 ESSENTIAL (PRIMARY) HYPERTENSION: ICD-10-CM

## 2022-09-24 DIAGNOSIS — D72.829 ELEVATED WHITE BLOOD CELL COUNT, UNSPECIFIED: ICD-10-CM

## 2022-09-24 DIAGNOSIS — R60.0 LOCALIZED EDEMA: ICD-10-CM

## 2022-09-24 DIAGNOSIS — R74.01 ELEVATION OF LEVELS OF LIVER TRANSAMINASE LEVELS: ICD-10-CM

## 2022-09-24 DIAGNOSIS — R33.9 RETENTION OF URINE, UNSPECIFIED: ICD-10-CM

## 2022-09-24 DIAGNOSIS — S72.009A FRACTURE OF UNSPECIFIED PART OF NECK OF UNSPECIFIED FEMUR, INITIAL ENCOUNTER FOR CLOSED FRACTURE: Chronic | ICD-10-CM

## 2022-09-24 DIAGNOSIS — K56.7 ILEUS, UNSPECIFIED: ICD-10-CM

## 2022-09-24 LAB
ALBUMIN SERPL ELPH-MCNC: 1.8 G/DL — LOW (ref 3.5–5)
ALP SERPL-CCNC: 201 U/L — HIGH (ref 40–120)
ALT FLD-CCNC: 75 U/L DA — HIGH (ref 10–60)
ANION GAP SERPL CALC-SCNC: 10 MMOL/L — SIGNIFICANT CHANGE UP (ref 5–17)
ANISOCYTOSIS BLD QL: SLIGHT — SIGNIFICANT CHANGE UP
AST SERPL-CCNC: 94 U/L — HIGH (ref 10–40)
BASOPHILS # BLD AUTO: 0 K/UL — SIGNIFICANT CHANGE UP (ref 0–0.2)
BASOPHILS NFR BLD AUTO: 0 % — SIGNIFICANT CHANGE UP (ref 0–2)
BILIRUB SERPL-MCNC: 0.6 MG/DL — SIGNIFICANT CHANGE UP (ref 0.2–1.2)
BUN SERPL-MCNC: 95 MG/DL — HIGH (ref 7–18)
CALCIUM SERPL-MCNC: 8 MG/DL — LOW (ref 8.4–10.5)
CHLORIDE SERPL-SCNC: 99 MMOL/L — SIGNIFICANT CHANGE UP (ref 96–108)
CO2 SERPL-SCNC: 22 MMOL/L — SIGNIFICANT CHANGE UP (ref 22–31)
CREAT SERPL-MCNC: 3.01 MG/DL — HIGH (ref 0.5–1.3)
EGFR: 14 ML/MIN/1.73M2 — LOW
EOSINOPHIL # BLD AUTO: 0 K/UL — SIGNIFICANT CHANGE UP (ref 0–0.5)
EOSINOPHIL NFR BLD AUTO: 0 % — SIGNIFICANT CHANGE UP (ref 0–6)
GLUCOSE SERPL-MCNC: 133 MG/DL — HIGH (ref 70–99)
HCT VFR BLD CALC: 29.7 % — LOW (ref 34.5–45)
HGB BLD-MCNC: 9.6 G/DL — LOW (ref 11.5–15.5)
LG PLATELETS BLD QL AUTO: SLIGHT — SIGNIFICANT CHANGE UP
LYMPHOCYTES # BLD AUTO: 0.82 K/UL — LOW (ref 1–3.3)
LYMPHOCYTES # BLD AUTO: 5 % — LOW (ref 13–44)
MAGNESIUM SERPL-MCNC: 2.7 MG/DL — HIGH (ref 1.6–2.6)
MANUAL SMEAR VERIFICATION: SIGNIFICANT CHANGE UP
MCHC RBC-ENTMCNC: 31.9 PG — SIGNIFICANT CHANGE UP (ref 27–34)
MCHC RBC-ENTMCNC: 32.3 GM/DL — SIGNIFICANT CHANGE UP (ref 32–36)
MCV RBC AUTO: 98.7 FL — SIGNIFICANT CHANGE UP (ref 80–100)
METAMYELOCYTES # FLD: 2 % — HIGH (ref 0–0)
MICROCYTES BLD QL: SLIGHT — SIGNIFICANT CHANGE UP
MONOCYTES # BLD AUTO: 0.66 K/UL — SIGNIFICANT CHANGE UP (ref 0–0.9)
MONOCYTES NFR BLD AUTO: 4 % — SIGNIFICANT CHANGE UP (ref 2–14)
NEUTROPHILS # BLD AUTO: 14.64 K/UL — HIGH (ref 1.8–7.4)
NEUTROPHILS NFR BLD AUTO: 89 % — HIGH (ref 43–77)
NRBC # BLD: 0 /100 — SIGNIFICANT CHANGE UP (ref 0–0)
PLAT MORPH BLD: NORMAL — SIGNIFICANT CHANGE UP
PLATELET # BLD AUTO: 487 K/UL — HIGH (ref 150–400)
POIKILOCYTOSIS BLD QL AUTO: SLIGHT — SIGNIFICANT CHANGE UP
POTASSIUM SERPL-MCNC: 4.7 MMOL/L — SIGNIFICANT CHANGE UP (ref 3.5–5.3)
POTASSIUM SERPL-SCNC: 4.7 MMOL/L — SIGNIFICANT CHANGE UP (ref 3.5–5.3)
PROT SERPL-MCNC: 6.4 G/DL — SIGNIFICANT CHANGE UP (ref 6–8.3)
RBC # BLD: 3.01 M/UL — LOW (ref 3.8–5.2)
RBC # FLD: 14.6 % — HIGH (ref 10.3–14.5)
RBC BLD AUTO: ABNORMAL
SARS-COV-2 RNA SPEC QL NAA+PROBE: DETECTED
SODIUM SERPL-SCNC: 131 MMOL/L — LOW (ref 135–145)
SPHEROCYTES BLD QL SMEAR: SLIGHT — SIGNIFICANT CHANGE UP
WBC # BLD: 16.45 K/UL — HIGH (ref 3.8–10.5)
WBC # FLD AUTO: 16.45 K/UL — HIGH (ref 3.8–10.5)

## 2022-09-24 PROCEDURE — 71045 X-RAY EXAM CHEST 1 VIEW: CPT | Mod: 26

## 2022-09-24 PROCEDURE — 99223 1ST HOSP IP/OBS HIGH 75: CPT | Mod: GC

## 2022-09-24 PROCEDURE — 99285 EMERGENCY DEPT VISIT HI MDM: CPT

## 2022-09-24 PROCEDURE — 74176 CT ABD & PELVIS W/O CONTRAST: CPT | Mod: 26

## 2022-09-24 PROCEDURE — 93970 EXTREMITY STUDY: CPT | Mod: 26

## 2022-09-24 RX ORDER — HEPARIN SODIUM 5000 [USP'U]/ML
5000 INJECTION INTRAVENOUS; SUBCUTANEOUS EVERY 8 HOURS
Refills: 0 | Status: DISCONTINUED | OUTPATIENT
Start: 2022-09-24 | End: 2022-10-01

## 2022-09-24 RX ORDER — METOPROLOL TARTRATE 50 MG
25 TABLET ORAL
Refills: 0 | Status: DISCONTINUED | OUTPATIENT
Start: 2022-09-24 | End: 2022-10-01

## 2022-09-24 RX ORDER — ASPIRIN/CALCIUM CARB/MAGNESIUM 324 MG
81 TABLET ORAL DAILY
Refills: 0 | Status: DISCONTINUED | OUTPATIENT
Start: 2022-09-24 | End: 2022-10-01

## 2022-09-24 RX ORDER — SENNA PLUS 8.6 MG/1
2 TABLET ORAL AT BEDTIME
Refills: 0 | Status: DISCONTINUED | OUTPATIENT
Start: 2022-09-24 | End: 2022-10-01

## 2022-09-24 RX ORDER — SIMVASTATIN 20 MG/1
40 TABLET, FILM COATED ORAL AT BEDTIME
Refills: 0 | Status: DISCONTINUED | OUTPATIENT
Start: 2022-09-24 | End: 2022-10-01

## 2022-09-24 RX ORDER — ACETAMINOPHEN 500 MG
650 TABLET ORAL EVERY 6 HOURS
Refills: 0 | Status: DISCONTINUED | OUTPATIENT
Start: 2022-09-24 | End: 2022-09-28

## 2022-09-24 RX ORDER — OXYCODONE HYDROCHLORIDE 5 MG/1
5 TABLET ORAL EVERY 6 HOURS
Refills: 0 | Status: DISCONTINUED | OUTPATIENT
Start: 2022-09-24 | End: 2022-09-24

## 2022-09-24 RX ORDER — GABAPENTIN 400 MG/1
100 CAPSULE ORAL
Refills: 0 | Status: DISCONTINUED | OUTPATIENT
Start: 2022-09-24 | End: 2022-10-01

## 2022-09-24 RX ORDER — OXYCODONE HYDROCHLORIDE 5 MG/1
5 TABLET ORAL ONCE
Refills: 0 | Status: DISCONTINUED | OUTPATIENT
Start: 2022-09-24 | End: 2022-09-24

## 2022-09-24 RX ORDER — POLYETHYLENE GLYCOL 3350 17 G/17G
17 POWDER, FOR SOLUTION ORAL DAILY
Refills: 0 | Status: DISCONTINUED | OUTPATIENT
Start: 2022-09-24 | End: 2022-10-01

## 2022-09-24 RX ORDER — FUROSEMIDE 40 MG
40 TABLET ORAL ONCE
Refills: 0 | Status: DISCONTINUED | OUTPATIENT
Start: 2022-09-24 | End: 2022-09-24

## 2022-09-24 RX ADMIN — SENNA PLUS 2 TABLET(S): 8.6 TABLET ORAL at 21:53

## 2022-09-24 RX ADMIN — HEPARIN SODIUM 5000 UNIT(S): 5000 INJECTION INTRAVENOUS; SUBCUTANEOUS at 21:53

## 2022-09-24 RX ADMIN — GABAPENTIN 100 MILLIGRAM(S): 400 CAPSULE ORAL at 21:53

## 2022-09-24 RX ADMIN — POLYETHYLENE GLYCOL 3350 17 GRAM(S): 17 POWDER, FOR SOLUTION ORAL at 21:52

## 2022-09-24 RX ADMIN — OXYCODONE HYDROCHLORIDE 5 MILLIGRAM(S): 5 TABLET ORAL at 15:47

## 2022-09-24 RX ADMIN — SIMVASTATIN 40 MILLIGRAM(S): 20 TABLET, FILM COATED ORAL at 21:53

## 2022-09-24 RX ADMIN — OXYCODONE HYDROCHLORIDE 5 MILLIGRAM(S): 5 TABLET ORAL at 22:15

## 2022-09-24 NOTE — ED ADULT NURSE NOTE - CAS TRG GEN SKIN COLOR
I have reviewed discharge instructions with the patient. The patient verbalized understanding.
Normal for race

## 2022-09-24 NOTE — H&P ADULT - HISTORY OF PRESENT ILLNESS
Patient is a 89 y/o F, Telugu-speaking from [facility]. She has a PMHx of HTN, HLD, s/p R hip surgery (Intramedullary nailing of right hip done 9/15/22), osteoporosis, +COVID from 7/20. She was discharged to rehab facility after the operation. She was noted to have progressive worsening of her bilateral leg edema. Prior to admission, she has increasing difficulty swallowing (mostly liquids), difficult urination, constipation, and increased abdominal distention. She denies any headache, dizziness, chest pain, shortness of breath, palpitation, abdominal pain, diarrhea, constipation. Abd XR from 9/23 showed generalized ileus and CXR from same date was unremarkable. In the ED, VS were stable, Lab showed elevated SCr 3.01, low Na of 131, inc. LFT , AST/ALT - 94/75. WBC is also elevated at 16.45 with no clear signs of infection.    GOC: FULL CODE for now Patient is a 89 y/o F, German-speaking from [facility]. She has a PMHx of HTN, HLD, s/p R hip surgery (Intramedullary nailing of right hip done 9/15/22), osteoporosis, +COVID from 7/20. She was discharged to rehab facility after the operation. She was noted to have progressive worsening of her bilateral leg edema. Prior to admission, she has increasing difficulty swallowing (mostly liquids), difficult urination, constipation, and increased abdominal distention. She denies any headache, dizziness, chest pain, shortness of breath, palpitation, abdominal pain, diarrhea, constipation. Abd XR from 9/23 showed generalized ileus and CXR from same date was unremarkable. In the ED, VS were stable, Lab showed elevated SCr 3.01, low Na of 131, inc. LFT , AST/ALT - 94/75. WBC is also elevated at 16.45 with no clear signs of infection.    GOC: DNR/DNI

## 2022-09-24 NOTE — H&P ADULT - PROBLEM SELECTOR PLAN 7
Hx of R hip fracture (s/p R hip surgery in 9/15/22)  was on eliquis for DVT prophylaxis  change to heparin in setting of RAMSEY  supportive care  pain meds as needed  avoid opioids due to suspected opioid-induced ileus Hx of R hip fracture (s/p R hip surgery in 9/15/22)  was on eliquis for DVT prophylaxis  change to heparin in setting of RAMSEY  supportive care  pain meds as needed  avoid opioids due to ileus

## 2022-09-24 NOTE — H&P ADULT - NSICDXPASTSURGICALHX_GEN_ALL_CORE_FT
PAST SURGICAL HISTORY:  Gall Bladder Disease Cholecystectomy  1994    Hip fracture s/p surgery in 9/15/22    History of Cholecystectomy

## 2022-09-24 NOTE — H&P ADULT - PROBLEM SELECTOR PLAN 4
p/w abd distention and elevated LFT  AST/ALT - 94/75  ALP - 201  CT Abd - normal liver  could be in the setting of fluid overload  monitor LFTs

## 2022-09-24 NOTE — H&P ADULT - NSHPPHYSICALEXAM_GEN_ALL_CORE
Vital Signs  · BP - 126/69 mm Hg  · Heart Rate - 86 /min  · Respiration Rate - 18 /min  · Temp - 97.9 F (36.6 C)  · SpO2 (%) - 100 %    PHYSICAL EXAM:  GENERAL: NAD, speaks in full sentences, no signs of respiratory distress  HEAD:  Atraumatic, Normocephalic  EYES: EOMI, PERRLA, conjunctiva and sclera clear  NECK: Supple, No JVD  CHEST/LUNG: Clear to auscultation bilaterally; No wheeze; No crackles; No accessory muscles used  HEART: Regular rate and rhythm; No murmurs;   ABDOMEN: Soft, Nontender, (+) distended, (+) tympanitic on percussion; Bowel sounds present; No guarding  EXTREMITIES:  2+ Peripheral Pulses, No cyanosis. 1+ bilateral pitting edema  PSYCH: AAOx3  NEUROLOGY: non-focal  SKIN: No rashes or lesions

## 2022-09-24 NOTE — H&P ADULT - NSICDXFAMHXNEG_GEN_ALL
asthma/atrial fibrillation/cancer/congestive heart failure/COPD/coronary disease/diabetes/dementia/heart disease/hypertension/kidney disease/stroke/thyroid disease/VTE

## 2022-09-24 NOTE — ED PROVIDER NOTE - CLINICAL SUMMARY MEDICAL DECISION MAKING FREE TEXT BOX
89-year-old female hx of HTN, HLD, recent admission for comminuted right intertrochanteric fracture discharged 4 days ago, presenting with abdominal swelling and BLE swelling x few days, worsening.  Cr 3, lytes ok, patient with valle in place. Will admit for RAMSEY.

## 2022-09-24 NOTE — ED ADULT NURSE NOTE - OBJECTIVE STATEMENT
axox2 ,nad , brought nursing home with bilateral lower legs swelling , pt  had a surgery on 09/15/22, pt with a Guardado cath and S/l on r hand 22 g

## 2022-09-24 NOTE — H&P ADULT - CONVERSATION DETAILS
had discussion regarding GOC with daughter at bedside  patient has prior MOLST from last admission stating she is DNR/DNI  daughter would like to discuss it first with the family  DNR/DNI for now  Primary team to follow-up GOC with the patient/family had discussion regarding GOC with daughter at bedside  patient has prior MOLST from last admission stating she is DNR/DNI  Confirmed with patient  DNR/DNI  Primary team to follow-up GOC with the patient/family

## 2022-09-24 NOTE — H&P ADULT - PROBLEM SELECTOR PLAN 3
noted to have dysuria and urinary retention  CT Abd/Pelvis showed moderately distended bladder, mild bilateral hydronephrosis  valle catheter placed  urine output monitored  clamp IFC if output >1L noted to have dysuria and urinary retention  CT Abd/Pelvis showed moderately distended bladder, mild bilateral hydronephrosis  valle catheter placed in the vagina, replace  urine output monitored  clamp IFC if output >1L

## 2022-09-24 NOTE — H&P ADULT - PROBLEM SELECTOR PLAN 2
p/w urinary retention  SCr - 3.01  could be in the setting of post-renal obstruction  valle catheter placed  monitor BMP p/w urinary retention  SCr - 3.01  could be in the setting of post-renal obstruction  valle catheter placed, on CT shown to be in vagina, ask nurse to replace  monitor BMP

## 2022-09-24 NOTE — H&P ADULT - NSHPREVIEWOFSYSTEMS_GEN_ALL_CORE
- CONSTITUTIONAL: Denies fever and chills  - HEENT: Denies changes in vision and hearing.  - RESPIRATORY: Denies SOB and cough.  - CV: Denies chest pain and palpitations  - GI: Denies abdominal pain, nausea, vomiting and diarrhea.  - : Denies dysuria and urinary frequency.  - SKIN: Denies rash and pruritus.  - NEUROLOGICAL: Denies headache and syncope.  - PSYCHIATRIC: Denies recent changes in mood. Denies anxiety and depression.

## 2022-09-24 NOTE — ED PROVIDER NOTE - MUSCULOSKELETAL, MLM
Spine appears normal, range of motion is not limited, no muscle or joint tenderness. BLE edema, abdominal swelling

## 2022-09-24 NOTE — H&P ADULT - ATTENDING COMMENTS
89yo F from [facility], [place], PMHx of HTN, HLD, recent Hip Fracture s/p IM nailing presenting with generalized edema and vomiting. Per family patient had not had a BM for 10 days until yesterday. Was noted to have increasing swelling and pain in her legs bilaterally. Yesterday she began to have nausea and vomiting. At the rehab an Abdominal X-ray was done which showed ileus and patient was made NPO. She did not improve and was sent to the hospital. In the ED, Cr elevated to 3.01, Na 131, WBC elevated to 16.45, but currently no sign of infection. CXR with bilateral hazziness, likely pulmonary edema, but patient saturating well on room air. CT A/P showed ileus with large amount of stool in colon and misplaced urinary catheter.    #Ileus (post-operative vs. opioid)  #RAMSEY due to Obstructive Uropathy  #Constipation  #Hypertension  #Hyperlipidemia  #Hyponatremia    -keep patient NPO  -surgery consult  -if patient has persistent nausea/vomiting can place NGT  -hold opioids, if no bowel movements then will give naloxegol  -Soap and Suds enema  -replace Valle catheter  -Strict I&O  -obtain RBUS  -nephrology consult  -heparin subq for DVT ppx  -continue on metoprolol, gabapentin  -Tylenol for pain  -trend Sodium, expect to improve after valle catheter draining urine

## 2022-09-24 NOTE — H&P ADULT - PROBLEM SELECTOR PLAN 5
p/w bilateral leg edema and pain  could be in the setting of post-obstructive RAMSEY  f/u Venous Doppler

## 2022-09-24 NOTE — H&P ADULT - PROBLEM SELECTOR PLAN 6
elevated WBC  - 16  no clear source of infection  could be reactive in the setting of fluid overload  hold antibiotics for now  monitor CBC  f/u UA, UCx

## 2022-09-24 NOTE — H&P ADULT - ASSESSMENT
Patient is a 89 y/o F with PMHx of HTN, HLD, s/p R hip surgery (Intramedullary nailing of right hip done 9/15/22), osteoporosis, +COVID from 7/20. Admitted for ileus.

## 2022-09-24 NOTE — H&P ADULT - PROBLEM SELECTOR PLAN 1
p/w chronic constipation, ileus  could be in the setting of post-op anesthesia vs. opioid induced constipation  Abd XR (9/23) as outpatient showed generalized ileus  NPO for now  start bowel regimen (miralax and senna)  give enema STAT  avoid opioids for pain regimen  Surgery consulted p/w chronic constipation, ileus  could be in the setting of post-op anesthesia vs. opioid induced constipation  Abd XR (9/23) as outpatient showed generalized ileus  CT A/P  NPO for now  start bowel regimen (miralax and senna)  give enema STAT  If no BM can consider naloxegol  avoid opioids for pain regimen  Surgery consulted

## 2022-09-24 NOTE — ED PROVIDER NOTE - OBJECTIVE STATEMENT
89-year-old female hx of HTN, HLD, recent admission for comminuted right intertrochanteric fracture discharged 4 days ago, presenting with abdominal swelling and BLE swelling x few days, worsening. No chest pain, SOB, cough, runny nose, fevers, chills, or any other symptoms.

## 2022-09-25 LAB
ALBUMIN SERPL ELPH-MCNC: 1.8 G/DL — LOW (ref 3.5–5)
ALP SERPL-CCNC: 181 U/L — HIGH (ref 40–120)
ALT FLD-CCNC: 63 U/L DA — HIGH (ref 10–60)
ANION GAP SERPL CALC-SCNC: 17 MMOL/L — SIGNIFICANT CHANGE UP (ref 5–17)
APPEARANCE UR: CLEAR — SIGNIFICANT CHANGE UP
AST SERPL-CCNC: 81 U/L — HIGH (ref 10–40)
BACTERIA # UR AUTO: ABNORMAL /HPF
BASOPHILS # BLD AUTO: 0.02 K/UL — SIGNIFICANT CHANGE UP (ref 0–0.2)
BASOPHILS NFR BLD AUTO: 0.2 % — SIGNIFICANT CHANGE UP (ref 0–2)
BILIRUB SERPL-MCNC: 0.6 MG/DL — SIGNIFICANT CHANGE UP (ref 0.2–1.2)
BILIRUB UR-MCNC: ABNORMAL
BUN SERPL-MCNC: 97 MG/DL — HIGH (ref 7–18)
CALCIUM SERPL-MCNC: 8 MG/DL — LOW (ref 8.4–10.5)
CHLORIDE SERPL-SCNC: 98 MMOL/L — SIGNIFICANT CHANGE UP (ref 96–108)
CO2 SERPL-SCNC: 18 MMOL/L — LOW (ref 22–31)
COLOR SPEC: YELLOW — SIGNIFICANT CHANGE UP
CREAT ?TM UR-MCNC: 111 MG/DL — SIGNIFICANT CHANGE UP
CREAT SERPL-MCNC: 2.91 MG/DL — HIGH (ref 0.5–1.3)
DIFF PNL FLD: ABNORMAL
EGFR: 15 ML/MIN/1.73M2 — LOW
EOSINOPHIL # BLD AUTO: 0.03 K/UL — SIGNIFICANT CHANGE UP (ref 0–0.5)
EOSINOPHIL NFR BLD AUTO: 0.2 % — SIGNIFICANT CHANGE UP (ref 0–6)
EPI CELLS # UR: SIGNIFICANT CHANGE UP /HPF
GLUCOSE SERPL-MCNC: 107 MG/DL — HIGH (ref 70–99)
GLUCOSE UR QL: NEGATIVE — SIGNIFICANT CHANGE UP
GRAN CASTS # UR COMP ASSIST: ABNORMAL /LPF
HCT VFR BLD CALC: 26.9 % — LOW (ref 34.5–45)
HGB BLD-MCNC: 8.4 G/DL — LOW (ref 11.5–15.5)
IMM GRANULOCYTES NFR BLD AUTO: 2.4 % — HIGH (ref 0–0.9)
KETONES UR-MCNC: NEGATIVE — SIGNIFICANT CHANGE UP
LEUKOCYTE ESTERASE UR-ACNC: ABNORMAL
LYMPHOCYTES # BLD AUTO: 0.86 K/UL — LOW (ref 1–3.3)
LYMPHOCYTES # BLD AUTO: 6.5 % — LOW (ref 13–44)
MAGNESIUM SERPL-MCNC: 2.7 MG/DL — HIGH (ref 1.6–2.6)
MCHC RBC-ENTMCNC: 30.7 PG — SIGNIFICANT CHANGE UP (ref 27–34)
MCHC RBC-ENTMCNC: 31.2 GM/DL — LOW (ref 32–36)
MCV RBC AUTO: 98.2 FL — SIGNIFICANT CHANGE UP (ref 80–100)
MONOCYTES # BLD AUTO: 0.9 K/UL — SIGNIFICANT CHANGE UP (ref 0–0.9)
MONOCYTES NFR BLD AUTO: 6.8 % — SIGNIFICANT CHANGE UP (ref 2–14)
NEUTROPHILS # BLD AUTO: 11.15 K/UL — HIGH (ref 1.8–7.4)
NEUTROPHILS NFR BLD AUTO: 83.9 % — HIGH (ref 43–77)
NITRITE UR-MCNC: NEGATIVE — SIGNIFICANT CHANGE UP
NRBC # BLD: 0 /100 WBCS — SIGNIFICANT CHANGE UP (ref 0–0)
OSMOLALITY UR: 357 MOS/KG — SIGNIFICANT CHANGE UP (ref 50–1200)
PH UR: 5 — SIGNIFICANT CHANGE UP (ref 5–8)
PHOSPHATE SERPL-MCNC: 6.9 MG/DL — HIGH (ref 2.5–4.5)
PLATELET # BLD AUTO: 489 K/UL — HIGH (ref 150–400)
POTASSIUM SERPL-MCNC: 4.2 MMOL/L — SIGNIFICANT CHANGE UP (ref 3.5–5.3)
POTASSIUM SERPL-SCNC: 4.2 MMOL/L — SIGNIFICANT CHANGE UP (ref 3.5–5.3)
POTASSIUM UR-SCNC: 42 MMOL/L — SIGNIFICANT CHANGE UP
PROT SERPL-MCNC: 6.1 G/DL — SIGNIFICANT CHANGE UP (ref 6–8.3)
PROT UR-MCNC: 15
RBC # BLD: 2.74 M/UL — LOW (ref 3.8–5.2)
RBC # FLD: 14.8 % — HIGH (ref 10.3–14.5)
RBC CASTS # UR COMP ASSIST: ABNORMAL /HPF (ref 0–2)
SODIUM SERPL-SCNC: 133 MMOL/L — LOW (ref 135–145)
SODIUM UR-SCNC: 8 MMOL/L — SIGNIFICANT CHANGE UP
SP GR SPEC: 1.02 — SIGNIFICANT CHANGE UP (ref 1.01–1.02)
UROBILINOGEN FLD QL: NEGATIVE — SIGNIFICANT CHANGE UP
UUN UR-MCNC: 527 MG/DL — SIGNIFICANT CHANGE UP
WBC # BLD: 13.28 K/UL — HIGH (ref 3.8–10.5)
WBC # FLD AUTO: 13.28 K/UL — HIGH (ref 3.8–10.5)
WBC UR QL: ABNORMAL /HPF (ref 0–5)

## 2022-09-25 PROCEDURE — 99233 SBSQ HOSP IP/OBS HIGH 50: CPT

## 2022-09-25 RX ORDER — CEFTRIAXONE 500 MG/1
1000 INJECTION, POWDER, FOR SOLUTION INTRAMUSCULAR; INTRAVENOUS EVERY 24 HOURS
Refills: 0 | Status: DISCONTINUED | OUTPATIENT
Start: 2022-09-25 | End: 2022-09-26

## 2022-09-25 RX ORDER — SODIUM CHLORIDE 9 MG/ML
1000 INJECTION INTRAMUSCULAR; INTRAVENOUS; SUBCUTANEOUS
Refills: 0 | Status: DISCONTINUED | OUTPATIENT
Start: 2022-09-25 | End: 2022-09-25

## 2022-09-25 RX ORDER — SODIUM CHLORIDE 9 MG/ML
1000 INJECTION INTRAMUSCULAR; INTRAVENOUS; SUBCUTANEOUS
Refills: 0 | Status: COMPLETED | OUTPATIENT
Start: 2022-09-25 | End: 2022-09-27

## 2022-09-25 RX ADMIN — GABAPENTIN 100 MILLIGRAM(S): 400 CAPSULE ORAL at 17:54

## 2022-09-25 RX ADMIN — Medication 650 MILLIGRAM(S): at 08:54

## 2022-09-25 RX ADMIN — Medication 650 MILLIGRAM(S): at 09:25

## 2022-09-25 RX ADMIN — SODIUM CHLORIDE 50 MILLILITER(S): 9 INJECTION INTRAMUSCULAR; INTRAVENOUS; SUBCUTANEOUS at 13:47

## 2022-09-25 RX ADMIN — HEPARIN SODIUM 5000 UNIT(S): 5000 INJECTION INTRAVENOUS; SUBCUTANEOUS at 05:59

## 2022-09-25 RX ADMIN — GABAPENTIN 100 MILLIGRAM(S): 400 CAPSULE ORAL at 06:00

## 2022-09-25 RX ADMIN — Medication 81 MILLIGRAM(S): at 13:47

## 2022-09-25 RX ADMIN — SODIUM CHLORIDE 80 MILLILITER(S): 9 INJECTION INTRAMUSCULAR; INTRAVENOUS; SUBCUTANEOUS at 22:45

## 2022-09-25 RX ADMIN — SENNA PLUS 2 TABLET(S): 8.6 TABLET ORAL at 22:45

## 2022-09-25 RX ADMIN — HEPARIN SODIUM 5000 UNIT(S): 5000 INJECTION INTRAVENOUS; SUBCUTANEOUS at 22:44

## 2022-09-25 RX ADMIN — POLYETHYLENE GLYCOL 3350 17 GRAM(S): 17 POWDER, FOR SOLUTION ORAL at 13:47

## 2022-09-25 RX ADMIN — SIMVASTATIN 40 MILLIGRAM(S): 20 TABLET, FILM COATED ORAL at 22:43

## 2022-09-25 RX ADMIN — CEFTRIAXONE 100 MILLIGRAM(S): 500 INJECTION, POWDER, FOR SOLUTION INTRAMUSCULAR; INTRAVENOUS at 22:43

## 2022-09-25 RX ADMIN — HEPARIN SODIUM 5000 UNIT(S): 5000 INJECTION INTRAVENOUS; SUBCUTANEOUS at 13:47

## 2022-09-25 NOTE — CONSULT NOTE ADULT - ASSESSMENT
89 year-old woman with RAMSEY due in part to obstruction, but also probably with hemodynamic component.  COVID-19 may be playing a role as well.    1. RAMSEY- unknown baseline SCr; sadie SCr 1.2-1.3. RAMSEY likely partially due to obstruction given urinary retention with hydronephrosis.  However, also likely hemodynamic component as well.    FeNa FENa+ 0.17%- c/w pre-renal disease.  Continue isotonic IVF.  Monitor renal fxn.    2. Hydronephrosis 2/2 urinary retention- continue valle for now.  TOV when able.  Will need to assess for resolution of hydronephrosis in the future.   3. Hyponatremia- hypovolemic- istotonic IVF as above.  4. LE edema due in part to DVTs, in part due to hypoalbuminemia in the setting of PCM with decreased po intake and also illness with covid-19.  AC per primary team.  Check urine protein/creatinine ratio to r/o nephrotic syndrome, though I highly doubt it given negative urine albumin on dipstick.  Encourage PO intake and f/u with RD.  NO DIURETICS as pt is intravascularly volume deplete.          [facility] NEPHROLOGY  Sai Moreland M.D.  Matt Mcguire D.O.  Lo Tucker M.D.  Charlette Hobbs, MSN, ANP-C    Telephone: [phone]  Facsimile: [phone]    [address] [place] [address]   89 year-old woman with RAMSEY due in part to obstruction, but also probably with hemodynamic component.  COVID-19 may be playing a role as well as might UTI.    1. RAMSEY- unknown baseline SCr; sadie SCr 1.2-1.3. RAMSEY likely partially due to obstruction given urinary retention with hydronephrosis.  However, also likely hemodynamic component as well.   UTI may be playing a role.  FeNa FENa+ 0.17%- c/w pre-renal disease.  Continue isotonic IVF.  Monitor renal fxn.    2. Hydronephrosis 2/2 urinary retention- 2/2 anaesthesia vs. UTI. continue valle for now.  TOV when able.  Abx per primary team. Will need to assess for resolution of hydronephrosis in the future.   3. Hyponatremia- hypovolemic- istotonic IVF as above.  4. LE edema due in part to DVTs, in part due to hypoalbuminemia in the setting of PCM with decreased po intake and also illness with covid-19.  AC per primary team.  Check urine protein/creatinine ratio to r/o nephrotic syndrome, though I highly doubt it given negative urine albumin on dipstick.  Encourage PO intake and f/u with RD.  NO DIURETICS as pt is intravascularly volume deplete.          [facility] NEPHROLOGY  Sai Moreland M.D.  Matt Mcguire D.O.  Lo Tucker M.D.  Charlette Hobbs, MSN, ANP-C    Telephone: [phone]  Facsimile: [phone]    [address] [place] [address]

## 2022-09-25 NOTE — CONSULT NOTE ADULT - SUBJECTIVE AND OBJECTIVE BOX
[facility] NEPHROLOGY- CONSULTATION NOTE    Patient is a 89y Female from [facility] s/p R hip surgery (Intramedullary nailing of right hip done 9/15/22, COVID + during that admission, who presented to the hospital with B LE swelling.  She was found to have urinary retention and a valle catheter was placed (initially placed in vagina, then replaced) (she has c/o difficulty urinating before).  She was also found to have an ileus on both outpatient Abd XR and here. At last admission, pt had RAMSEY with creatinine peaked at 3, improved to 1.6 upon discharge. Unknown baseline SCr; sadie SCr 1.2-1.3.    PAST MEDICAL & SURGICAL HISTORY:  Hyperlipidemia      Arthritis      Anemia      Neuropathy      Hypertension      History of Cholecystectomy      Gall Bladder Disease  Cholecystectomy        Hip fracture  s/p surgery in 9/15/22        No Known Allergies    Home Medications Reviewed  Hospital Medications:   MEDICATIONS  (STANDING):  aspirin  chewable 81 milliGRAM(s) Oral daily  cefTRIAXone   IVPB 1000 milliGRAM(s) IV Intermittent every 24 hours  gabapentin 100 milliGRAM(s) Oral two times a day  heparin   Injectable 5000 Unit(s) SubCutaneous every 8 hours  metoprolol tartrate 25 milliGRAM(s) Oral two times a day  polyethylene glycol 3350 17 Gram(s) Oral daily  senna 2 Tablet(s) Oral at bedtime  simvastatin 40 milliGRAM(s) Oral at bedtime  sodium chloride 0.9%. 1000 milliLiter(s) (80 mL/Hr) IV Continuous <Continuous>    SOCIAL HISTORY:  Denies ETOh,Smoking,   FAMILY HISTORY:  No pertinent family history in first degree relatives      REVIEW OF SYSTEMS:  CONSTITUTIONAL: No weakness, fevers or chills  EYES/ENT: No visual changes;  No vertigo or throat pain   NECK: No pain or stiffness  RESPIRATORY: No cough, wheezing, hemoptysis; No shortness of breath  CARDIOVASCULAR: No chest pain or palpitations.  GASTROINTESTINAL: No abdominal or epigastric pain. No nausea, vomiting, or hematemesis; No diarrhea or constipation. No melena or hematochezia.  GENITOURINARY: No dysuria, frequency, foamy urine, urinary urgency, incontinence or hematuria  NEUROLOGICAL: No numbness or weakness  SKIN: No itching, burning, rashes, or lesions   VASCULAR: No bilateral lower extremity edema.   All other review of systems is negative unless indicated above.    VITALS:  T(F): 97.9 (22 @ 22:00), Max: 98.3 (22 @ 03:19)  HR: 86 (22 @ 22:00)  BP: 109/56 (22 @ 22:00)  RR: 17 (22 @ 22:00)  SpO2: 97% (22 @ 22:00)  Wt(kg): --     @ 07:01  -   @ 22:58  --------------------------------------------------------  IN: 0 mL / OUT: 1100 mL / NET: -1100 mL          PHYSICAL EXAM:  Constitutional: lethargic  HEENT: anicteric sclera, oropharynx clear, MMM  Neck: No JVD  Respiratory: CTAB, no wheezes, rales or rhonchi  Cardiovascular: S1, S2, RRR  Gastrointestinal: BS+, soft, NT/ND  Extremities: No cyanosis or clubbing. + B LE edema  Neurological: A/O x 3, no focal deficits  Psychiatric: Normal mood, normal affect  : No CVA tenderness. + valle with yellow urine  Skin: No rashes  Vascular Access:    LABS:    133 <--, 131 <--,  ||||||| NH||||||| <-- 139 <--, 140 <--  133<L>  |  98  |  97<H>  ----------------------------<  107<H>  4.2   |  18<L>  |  2.91<H>    Ca    8.0<L>      25 Sep 2022 05:25  Phos  6.9       Mg     2.7         TPro  6.1  /  Alb  1.8<L>  /  TBili  0.6  /  DBili      /  AST  81<H>  /  ALT  63<H>  /  AlkPhos  181<H>      Creatinine Trend: 2.91 <--, 3.01 <--  ||||||| NH||||||| <-- 1.64 <--, 1.96 <--                        8.4    13.28 )-----------( 489      ( 25 Sep 2022 05:25 )             26.9     Urine Studies:  Urinalysis Basic - ( 25 Sep 2022 03:17 )    Color: Yellow / Appearance: Clear / S.020 / pH:   Gluc:  / Ketone: Negative  / Bili: Small / Urobili: Negative   Blood:  / Protein: 15 / Nitrite: Negative   Leuk Esterase: Small / RBC: 5-10 /HPF / WBC 26-50 /HPF   Sq Epi:  / Non Sq Epi: Few /HPF / Bacteria: Many /HPF      Sodium, Random Urine: 8 mmol/L ( @ 03:17)  Creatinine, Random Urine: 111 mg/dL ( @ 03:17)  Osmolality, Random Urine: 357 mos/kg ( @ 03:17)  Potassium, Random Urine: 42 mmol/L ( @ 03:17)  FENa+ 0.17%    RADIOLOGY & ADDITIONAL STUDIES:      < from: CT Abdomen and Pelvis No Cont (22 @ 17:45) >    ACC: [id number] EXAM:  CT ABDOMEN AND PELVIS                          PROCEDURE DATE:  2022          INTERPRETATION:  CLINICAL INFORMATION: Abdominal distention, fluid   retention, worsening renal function. Recent admission for right hip   fracture.    COMPARISON: CT abdomen pelvis 2015.    CONTRAST/COMPLICATIONS:  IV Contrast: NONE  Oral Contrast: NONE  Complications: None reported at time of study completion    PROCEDURE:  CT of the Abdomen and Pelvis was performed.  Sagittal and coronal reformats were performed.    FINDINGS:  Evaluation of the solid organs and vasculature is limited without   intravenous contrast.    LOWER CHEST: Small bilateral pleural effusions.    LIVER: Within normal limits.  BILE DUCTS: Normal caliber.  GALLBLADDER: Not visualized, may be surgically absent.  SPLEEN: Within normal limits.  PANCREAS: Within normal limits.  ADRENALS: Within normal limits.  KIDNEYS/URETERS: Mild bilateral hydroureteronephrosis.    BLADDER: Moderately distended with small focus of air.  REPRODUCTIVE ORGANS: Valle catheter balloon inflated in the vagina.    BOWEL: Small hiatal hernia. Mild air-fluid distention of the small bowel   without discrete transition point. Ascending and transverse colon   distended with air and fluid. Moderate amount of formed stool scattered   throughout the distal transverse colon to the rectum. Findings are   suggestive of ileus. Appendix is normal.  PERITONEUM: No ascites.  VESSELS: Atherosclerotic changes.  RETROPERITONEUM/LYMPH NODES: No lymphadenopathy.  ABDOMINAL WALL: Subcutaneous edema. Small foci of subcutaneous air in the   ventral abdominal wall, likely related to subcutaneous injection.  BONES: Degenerative changes. Old left superior and inferior pubic bone   fractures. Status post ORIF of a comminuted right intertrochanteric   femoral fracture.    IMPRESSION:  Valle catheter balloon inflated in the vagina.    Moderately distended bladder with mild bilateral hydroureteronephrosis.    Small bowel and large bowel dilatation without discrete transition point,   suggestive of ileus. Moderate stool scattered throughout the distal colon.    --- End of Report ---            SHELLY BAR MD; Attending Radiologist  This document has been electronically signed. Sep 24 2022  6:04PM    < end of copied text >

## 2022-09-25 NOTE — PATIENT PROFILE ADULT - FALL HARM RISK - HARM RISK INTERVENTIONS

## 2022-09-25 NOTE — PROGRESS NOTE ADULT - ASSESSMENT
89yo F PMHx of HTN, HLD, osteoporosis, recent R hip repair presenting with ileus and RAMSEY.    #Ileus  #RAMSEY  #Urinary Retention  #UTI  #Leukocytosis  #Constipation    -NPO, give bowel rest, if develops nausea/vomiting would place NGT for decompression  -give enema for stimulation, if no improvement will try naloxegol tomorrow or pro-kinectics  -FeNa 0.3%,suggestive of pre-renal will give IV Fluids as patient NPO with poor oral intake, trend Cr  -Renal recommendations appreciated  -hold opioids, continue with tylenol for pain  -UA with pyuria, start ceftriaxone, discontinue if UC negative  -continue with bowel regimen  -heparin subq for DVT ppx

## 2022-09-25 NOTE — PROGRESS NOTE ADULT - SUBJECTIVE AND OBJECTIVE BOX
* NOTE IS PENDING *    S:    O:  Vital Signs Last 24 Hrs  T(C): 36.7 (25 Sep 2022 06:54), Max: 36.8 (25 Sep 2022 03:19)  T(F): 98 (25 Sep 2022 06:54), Max: 98.3 (25 Sep 2022 03:19)  HR: 107 (25 Sep 2022 06:54) (85 - 107)  BP: 151/37 (25 Sep 2022 06:54) (101/58 - 151/37)  BP(mean): --  RR: 16 (25 Sep 2022 06:54) (16 - 18)  SpO2: 98% (25 Sep 2022 06:54) (82% - 100%)    Parameters below as of 25 Sep 2022 06:54  Patient On (Oxygen Delivery Method): room air        GENERAL: NAD, well-developed  HEAD:  Atraumatic, Normocephalic  EYES: EOMI, PERRLA, conjunctiva and sclera clear  NECK: Supple, No JVD  CHEST/LUNG: Clear to auscultation bilaterally; No wheeze  HEART: Regular rate and rhythm; No murmurs, rubs, or gallops  ABDOMEN: Soft, Nontender, Nondistended; Bowel sounds present  EXTREMITIES:  2+ Peripheral Pulses, No clubbing, cyanosis, or edema  PSYCH: AAOx3  NEUROLOGY: non-focal  SKIN: No rashes or lesions    acetaminophen     Tablet .. 650 milliGRAM(s) Oral every 6 hours PRN  aspirin  chewable 81 milliGRAM(s) Oral daily  gabapentin 100 milliGRAM(s) Oral two times a day  heparin   Injectable 5000 Unit(s) SubCutaneous every 8 hours  metoprolol tartrate 25 milliGRAM(s) Oral two times a day  polyethylene glycol 3350 17 Gram(s) Oral daily  senna 2 Tablet(s) Oral at bedtime  simvastatin 40 milliGRAM(s) Oral at bedtime                            8.4    13.28 )-----------( 489      ( 25 Sep 2022 05:25 )             26.9       09-25    133<L>  |  98  |  97<H>  ----------------------------<  107<H>  4.2   |  18<L>  |  2.91<H>    Ca    8.0<L>      25 Sep 2022 05:25  Phos  6.9     09-25  Mg     2.7     09-25    TPro  6.1  /  Alb  1.8<L>  /  TBili  0.6  /  DBili  x   /  AST  81<H>  /  ALT  63<H>  /  AlkPhos  181<H>  09-25   S: No acute overnight events. Guardado placed draining urine.    O:  Vital Signs Last 24 Hrs  T(C): 36.7 (25 Sep 2022 06:54), Max: 36.8 (25 Sep 2022 03:19)  T(F): 98 (25 Sep 2022 06:54), Max: 98.3 (25 Sep 2022 03:19)  HR: 107 (25 Sep 2022 06:54) (85 - 107)  BP: 151/37 (25 Sep 2022 06:54) (101/58 - 151/37)  BP(mean): --  RR: 16 (25 Sep 2022 06:54) (16 - 18)  SpO2: 98% (25 Sep 2022 06:54) (82% - 100%)    Parameters below as of 25 Sep 2022 06:54  Patient On (Oxygen Delivery Method): room air        GENERAL: NAD, well-developed  HEAD:  Atraumatic, Normocephalic  EYES: EOMI, PERRLA, conjunctiva and sclera clear  NECK: Supple, No JVD  CHEST/LUNG: Clear to auscultation bilaterally; No wheeze  HEART: Regular rate and rhythm; No murmurs, rubs, or gallops  ABDOMEN: Soft, Nontender,Distended Bowel sounds hypoactive  EXTREMITIES:  2+ Peripheral Pulses, No clubbing, cyanosis, or 1+ LE edema bilaterally  PSYCH: AAOx3  NEUROLOGY: non-focal  SKIN: No rashes or lesions    acetaminophen     Tablet .. 650 milliGRAM(s) Oral every 6 hours PRN  aspirin  chewable 81 milliGRAM(s) Oral daily  gabapentin 100 milliGRAM(s) Oral two times a day  heparin   Injectable 5000 Unit(s) SubCutaneous every 8 hours  metoprolol tartrate 25 milliGRAM(s) Oral two times a day  polyethylene glycol 3350 17 Gram(s) Oral daily  senna 2 Tablet(s) Oral at bedtime  simvastatin 40 milliGRAM(s) Oral at bedtime                            8.4    13.28 )-----------( 489      ( 25 Sep 2022 05:25 )             26.9       09-25    133<L>  |  98  |  97<H>  ----------------------------<  107<H>  4.2   |  18<L>  |  2.91<H>    Ca    8.0<L>      25 Sep 2022 05:25  Phos  6.9     09-25  Mg     2.7     09-25    TPro  6.1  /  Alb  1.8<L>  /  TBili  0.6  /  DBili  x   /  AST  81<H>  /  ALT  63<H>  /  AlkPhos  181<H>  09-25

## 2022-09-26 DIAGNOSIS — N39.0 URINARY TRACT INFECTION, SITE NOT SPECIFIED: ICD-10-CM

## 2022-09-26 LAB
ALBUMIN SERPL ELPH-MCNC: 1.6 G/DL — LOW (ref 3.5–5)
ALP SERPL-CCNC: 154 U/L — HIGH (ref 40–120)
ALT FLD-CCNC: 45 U/L DA — SIGNIFICANT CHANGE UP (ref 10–60)
ANION GAP SERPL CALC-SCNC: 15 MMOL/L — SIGNIFICANT CHANGE UP (ref 5–17)
AST SERPL-CCNC: 65 U/L — HIGH (ref 10–40)
BASOPHILS # BLD AUTO: 0.01 K/UL — SIGNIFICANT CHANGE UP (ref 0–0.2)
BASOPHILS NFR BLD AUTO: 0.1 % — SIGNIFICANT CHANGE UP (ref 0–2)
BILIRUB DIRECT SERPL-MCNC: 0.3 MG/DL — SIGNIFICANT CHANGE UP (ref 0–0.3)
BILIRUB SERPL-MCNC: 0.6 MG/DL — SIGNIFICANT CHANGE UP (ref 0.2–1.2)
BUN SERPL-MCNC: 96 MG/DL — HIGH (ref 7–18)
CALCIUM SERPL-MCNC: 7.5 MG/DL — LOW (ref 8.4–10.5)
CHLORIDE SERPL-SCNC: 101 MMOL/L — SIGNIFICANT CHANGE UP (ref 96–108)
CO2 SERPL-SCNC: 18 MMOL/L — LOW (ref 22–31)
CREAT ?TM UR-MCNC: 54 MG/DL — SIGNIFICANT CHANGE UP
CREAT SERPL-MCNC: 2.18 MG/DL — HIGH (ref 0.5–1.3)
CULTURE RESULTS: NO GROWTH — SIGNIFICANT CHANGE UP
EGFR: 21 ML/MIN/1.73M2 — LOW
EOSINOPHIL # BLD AUTO: 0.03 K/UL — SIGNIFICANT CHANGE UP (ref 0–0.5)
EOSINOPHIL NFR BLD AUTO: 0.3 % — SIGNIFICANT CHANGE UP (ref 0–6)
GLUCOSE SERPL-MCNC: 86 MG/DL — SIGNIFICANT CHANGE UP (ref 70–99)
HCT VFR BLD CALC: 25.9 % — LOW (ref 34.5–45)
HGB BLD-MCNC: 8.2 G/DL — LOW (ref 11.5–15.5)
IMM GRANULOCYTES NFR BLD AUTO: 2.3 % — HIGH (ref 0–0.9)
LYMPHOCYTES # BLD AUTO: 0.64 K/UL — LOW (ref 1–3.3)
LYMPHOCYTES # BLD AUTO: 5.9 % — LOW (ref 13–44)
MAGNESIUM SERPL-MCNC: 2.6 MG/DL — SIGNIFICANT CHANGE UP (ref 1.6–2.6)
MCHC RBC-ENTMCNC: 31.2 PG — SIGNIFICANT CHANGE UP (ref 27–34)
MCHC RBC-ENTMCNC: 31.7 GM/DL — LOW (ref 32–36)
MCV RBC AUTO: 98.5 FL — SIGNIFICANT CHANGE UP (ref 80–100)
MONOCYTES # BLD AUTO: 0.65 K/UL — SIGNIFICANT CHANGE UP (ref 0–0.9)
MONOCYTES NFR BLD AUTO: 6 % — SIGNIFICANT CHANGE UP (ref 2–14)
NEUTROPHILS # BLD AUTO: 9.33 K/UL — HIGH (ref 1.8–7.4)
NEUTROPHILS NFR BLD AUTO: 85.4 % — HIGH (ref 43–77)
NRBC # BLD: 0 /100 WBCS — SIGNIFICANT CHANGE UP (ref 0–0)
PHOSPHATE SERPL-MCNC: 6 MG/DL — HIGH (ref 2.5–4.5)
PLATELET # BLD AUTO: 439 K/UL — HIGH (ref 150–400)
POTASSIUM SERPL-MCNC: 4.1 MMOL/L — SIGNIFICANT CHANGE UP (ref 3.5–5.3)
POTASSIUM SERPL-SCNC: 4.1 MMOL/L — SIGNIFICANT CHANGE UP (ref 3.5–5.3)
PROT ?TM UR-MCNC: 38 MG/DL — HIGH (ref 0–12)
PROT SERPL-MCNC: 5.5 G/DL — LOW (ref 6–8.3)
RBC # BLD: 2.63 M/UL — LOW (ref 3.8–5.2)
RBC # FLD: 14.6 % — HIGH (ref 10.3–14.5)
SODIUM SERPL-SCNC: 134 MMOL/L — LOW (ref 135–145)
SPECIMEN SOURCE: SIGNIFICANT CHANGE UP
WBC # BLD: 10.91 K/UL — HIGH (ref 3.8–10.5)
WBC # FLD AUTO: 10.91 K/UL — HIGH (ref 3.8–10.5)

## 2022-09-26 RX ADMIN — HEPARIN SODIUM 5000 UNIT(S): 5000 INJECTION INTRAVENOUS; SUBCUTANEOUS at 22:41

## 2022-09-26 RX ADMIN — Medication 650 MILLIGRAM(S): at 22:00

## 2022-09-26 RX ADMIN — SODIUM CHLORIDE 80 MILLILITER(S): 9 INJECTION INTRAMUSCULAR; INTRAVENOUS; SUBCUTANEOUS at 13:01

## 2022-09-26 RX ADMIN — GABAPENTIN 100 MILLIGRAM(S): 400 CAPSULE ORAL at 06:36

## 2022-09-26 RX ADMIN — SENNA PLUS 2 TABLET(S): 8.6 TABLET ORAL at 22:41

## 2022-09-26 RX ADMIN — SIMVASTATIN 40 MILLIGRAM(S): 20 TABLET, FILM COATED ORAL at 22:41

## 2022-09-26 RX ADMIN — HEPARIN SODIUM 5000 UNIT(S): 5000 INJECTION INTRAVENOUS; SUBCUTANEOUS at 06:38

## 2022-09-26 RX ADMIN — HEPARIN SODIUM 5000 UNIT(S): 5000 INJECTION INTRAVENOUS; SUBCUTANEOUS at 13:00

## 2022-09-26 RX ADMIN — Medication 650 MILLIGRAM(S): at 19:02

## 2022-09-26 RX ADMIN — GABAPENTIN 100 MILLIGRAM(S): 400 CAPSULE ORAL at 17:23

## 2022-09-26 RX ADMIN — POLYETHYLENE GLYCOL 3350 17 GRAM(S): 17 POWDER, FOR SOLUTION ORAL at 13:00

## 2022-09-26 RX ADMIN — Medication 81 MILLIGRAM(S): at 13:00

## 2022-09-26 NOTE — PROGRESS NOTE ADULT - SUBJECTIVE AND OBJECTIVE BOX
[facility] NEPHROLOGY- PROGRESS NOTE    89yo F PMHx of HTN, HLD, osteoporosis, recent R hip repair / resolving RAMSEY presenting with ileus and recurrent RAMSEY. Nephrology consulted for Elevated serum creatinine.  +COVID 19    Hospital Medications: Medications reviewed.  REVIEW OF SYSTEMS:  CONSTITUTIONAL: No fevers or chills  RESPIRATORY: No shortness of breath  CARDIOVASCULAR: No chest pain.  GASTROINTESTINAL: No nausea, vomiting, diarrhea or abdominal pain.   VASCULAR: No bilateral lower extremity edema.     VITALS:  T(F): 98.4 (22 @ 05:03), Max: 98.4 (22 @ 05:03)  HR: 103 (22 @ 05:03)  BP: 106/45 (22 @ 05:03)  RR: 17 (22 @ 05:03)  SpO2: 96% (22 @ 05:03)  Wt(kg): --  Height (cm): 162.6 ( @ 11:50), 162.6 (:04)  Weight (kg): 81.6 ( @ 11:50), 68 (:04)  BMI (kg/m2): 30.9 ( @ 11:50), 25.7 (:04)  BSA (m2): 1.87 ( @ 11:50), 1.73 ( 13:04)     @ 07:01  -   @ 07:00  --------------------------------------------------------  IN: 1040 mL / OUT: 1625 mL / NET: -585 mL      PHYSICAL EXAM:  Constitutional: NAD  Neurological: Awake and Alert  HEENT: anicteric sclera,   Respiratory: CTAB, no wheezes, rales or rhonchi  Cardiovascular: S1, S2, RRR  Gastrointestinal: BS+, soft, NT/ND  : +valle.  Extremities: No peripheral edema    LABS:      134<L>  |  101  |  96<H>  ----------------------------<  86  4.1   |  18<L>  |  2.18<H>    Ca    7.5<L>      26 Sep 2022 07:25  Phos  6.0       Mg     2.6         TPro  5.5<L>  /  Alb  1.6<L>  /  TBili  0.6  /  DBili  0.3  /  AST  65<H>  /  ALT  45  /  AlkPhos  154<H>      Creatinine Trend: 2.18 <--, 2.91 <--, 3.01 <--, 1.64 <--                        8.2    10.91 )-----------( 439      ( 26 Sep 2022 07:25 )             25.9     Urine Studies:  Urinalysis Basic - ( 25 Sep 2022 03:17 )    Color: Yellow / Appearance: Clear / S.020 / pH:   Gluc:  / Ketone: Negative  / Bili: Small / Urobili: Negative   Blood:  / Protein: 15 / Nitrite: Negative   Leuk Esterase: Small / RBC: 5-10 /HPF / WBC 26-50 /HPF   Sq Epi:  / Non Sq Epi: Few /HPF / Bacteria: Many /HPF      Sodium, Random Urine: 8 mmol/L ( @ 03:17)  Creatinine, Random Urine: 111 mg/dL ( @ 03:17)  Osmolality, Random Urine: 357 mos/kg ( @ 03:17)  Potassium, Random Urine: 42 mmol/L ( @ 03:17)    RADIOLOGY & ADDITIONAL STUDIES:

## 2022-09-26 NOTE — PROGRESS NOTE ADULT - PROBLEM SELECTOR PLAN 7
Hx of R hip fracture (s/p R hip surgery in 9/15/22)  was on eliquis for DVT prophylaxis  change to heparin in setting of RAMSEY  supportive care  pain meds as needed  avoid opioids due to ileus - Downtrending but left shift noted   - Continue to monitor for signs and sxms of infection  - CBC in AM-f/u results

## 2022-09-26 NOTE — PROGRESS NOTE ADULT - ASSESSMENT
89yo F PMHx of HTN, HLD, osteoporosis, recent R hip repair / resolving RAMSEY presenting with ileus and recurrent RAMSEY. Nephrology consulted for Elevated serum creatinine.      1. RAMSEY- unknown baseline SCr; sadie SCr 1.2-1.3. RAMSEY likely partially due to obstruction given urinary retention with hydronephrosis/ pre-renal component. Renal function now improving with IVF;   FENa+ 0.17%- consistent wit pre-renal RAMSEY. c/w IVF. Strict I/Os. Avoid nephrotoxins/ NSAIDs/ RCA. Monitor BMP.  2. Hydronephrosis 2/2 urinary retention- continue valle for now.  TOV when able.  Abx per primary team. Will need to assess for resolution of hydronephrosis in the future.   3. Hyponatremia- hypovolemic- improving with NS IVF. c/w istotonic IVF as above.  4. LE edema due in part to DVTs, in part due to hypoalbuminemia in the setting of PCM with decreased po intake and also illness with covid-19.  AC per primary team.  Recc protein supplements.   Check urine protein/creatinine ratio to r/o nephrotic syndrome, low suspicion. NO DIURETICS as pt is intravascularly volume deplete.          [facility] NEPHROLOGY  Sai Moreland M.D.  Matt Mcguire D.O.  Lo Tucker M.D.  Charlette Hobbs, MSN, ANP-C    Telephone: [phone]  Facsimile: [phone]    [address] [place] [address]   89yo F PMHx of HTN, HLD, osteoporosis, recent R hip repair / resolving RAMSEY presenting with ileus and recurrent RAMSEY. Nephrology consulted for Elevated serum creatinine.      1. RAMSEY- unknown baseline SCr; sadie SCr 1.2-1.3. RAMSEY likely partially due to obstruction given urinary retention with hydronephrosis/ pre-renal component. Renal function now improving with IVF; UA active in the setting of UTI; now on Ceftriaxone. FENa+ 0.17%- consistent wit pre-renal RAMSEY. c/w IVF. Strict I/Os. Avoid nephrotoxins/ NSAIDs/ RCA. Monitor BMP.  2. Hydronephrosis 2/2 urinary retention- continue valle for now.  TOV when able.  Abx per primary team. Will need to assess for resolution of hydronephrosis in the future.   3. Hyponatremia- hypovolemic- improving with NS IVF. c/w istotonic IVF as above.  4. LE edema due in part to DVTs, in part due to hypoalbuminemia in the setting of PCM with decreased po intake and also illness with covid-19.  AC per primary team.  Recc protein supplements.   Check urine protein/creatinine ratio to r/o nephrotic syndrome, low suspicion. NO DIURETICS as pt is intravascularly volume deplete.          [facility] NEPHROLOGY  Sai Moreland M.D.  Matt Mcguire D.O.  Lo Tucker M.D.  Charlette Hobbs, MSN, ANP-C    Telephone: [phone]  Facsimile: [phone]    [address] [place] [address]

## 2022-09-26 NOTE — PROGRESS NOTE ADULT - PROBLEM SELECTOR PLAN 10
heparin for DVT prophylaxis  protonix for GI prophylaxis H/o HLD, home med Simvastatin  - C/w Simvastatin

## 2022-09-26 NOTE — CHART NOTE - NSCHARTNOTEFT_GEN_A_CORE
NP rec'vd msg from RN that pt's dtr called.     NP called pt's dtr- Dasha Neumann back at number listed in chart but VM.  NP did not leave a VM.

## 2022-09-26 NOTE — PHYSICAL THERAPY INITIAL EVALUATION ADULT - ADDITIONAL COMMENTS
patient was in rehab s/p last hospital discharge  patient lived on 1st floor with 1 stoop to negotiate. Daughter lives on 2nd floor. Patient had HHA and used rollator at home

## 2022-09-26 NOTE — PROGRESS NOTE ADULT - PROBLEM SELECTOR PLAN 2
p/w urinary retention  SCr - 3.01  could be in the setting of post-renal obstruction  valle catheter placed, on CT shown to be in vagina, ask nurse to replace  monitor BMP negative - Most likely pre-renal d/t decreased PO intake   - C/w IVF & Guardado   - Continue to avoid nephrotoxic medications  - Continue to monitor CMP in AM-f/u results No oral lesions; no gross abnormalities

## 2022-09-26 NOTE — PHYSICAL THERAPY INITIAL EVALUATION ADULT - DIAGNOSIS, PT EVAL
Chief Complaint   Patient presents with    Leg Injury     Pt has R leg bruising, pan and swelling due to car door injury. 1. Have you been to the ER, urgent care clinic since your last visit? Hospitalized since your last visit? No    2. Have you seen or consulted any other health care providers outside of the [address] [place] [address] since your last visit? Include any pap smears or colon screening.  No Declined functional status

## 2022-09-26 NOTE — PROGRESS NOTE ADULT - ASSESSMENT
89 year old Female with PMH of HTN, HLD, s/p R hip surgery (Intramedullary nailing of right hip done 9/15/22), osteoporosis, COVID (+) from 7/20.   Admitted for Ileus.

## 2022-09-26 NOTE — PROGRESS NOTE ADULT - PROBLEM SELECTOR PLAN 6
elevated WBC  - 16  no clear source of infection  could be reactive in the setting of fluid overload  hold antibiotics for now  monitor CBC  f/u UA, UCx - P/w bilateral leg edema and pain  - could be in the setting of post-obstructive RAMSEY  - LE US negative for DVT

## 2022-09-26 NOTE — PROGRESS NOTE ADULT - PROBLEM SELECTOR PLAN 4
p/w abd distention and elevated LFT  AST/ALT - 94/75  ALP - 201  CT Abd - normal liver  could be in the setting of fluid overload  monitor LFTs - UA with pyuria.  Started Ceftriaxone   - Ucx negative and dc'd Ceftriaxone

## 2022-09-26 NOTE — PROGRESS NOTE ADULT - SUBJECTIVE AND OBJECTIVE BOX
NP Note discussed with  primary attending    Patient is a 89y old  Female who presents with a chief complaint of ileus (26 Sep 2022 12:38)      INTERVAL HPI/OVERNIGHT EVENTS: Pt stated, "I have not eaten, you give me nothing to eat. "  NP asked pt if she had an appetite>  Pt stated, "I'm hungry."  NP asked pt if she had abdominal pain? Pt stated, "No no pain, I have no problem."  Pt denied HA, dizziness, SOB, CP, abdominal pain, nausea or vomiting.  "Togolese -Yoanna # [id number].    MEDICATIONS  (STANDING):  aspirin  chewable 81 milliGRAM(s) Oral daily  cefTRIAXone   IVPB 1000 milliGRAM(s) IV Intermittent every 24 hours  gabapentin 100 milliGRAM(s) Oral two times a day  heparin   Injectable 5000 Unit(s) SubCutaneous every 8 hours  metoprolol tartrate 25 milliGRAM(s) Oral two times a day  polyethylene glycol 3350 17 Gram(s) Oral daily  senna 2 Tablet(s) Oral at bedtime  simvastatin 40 milliGRAM(s) Oral at bedtime  sodium chloride 0.9%. 1000 milliLiter(s) (80 mL/Hr) IV Continuous <Continuous>    MEDICATIONS  (PRN):  acetaminophen     Tablet .. 650 milliGRAM(s) Oral every 6 hours PRN Mild Pain (1 - 3)      __________________________________________________  REVIEW OF SYSTEMS:    CONSTITUTIONAL: No fever  EYES: No acute visual disturbances  NECK: No pain or stiffness  RESPIRATORY: No cough; No shortness of breath  CARDIOVASCULAR: No chest pain, no palpitations  GASTROINTESTINAL: No pain. No nausea or vomiting.  No diarrhea   NEUROLOGICAL: No headache or numbness, no tremors  MUSCULOSKELETAL: No joint pain, no muscle pain  GENITOURINARY: No dysuria, no frequency, no hesitancy  PSYCHIATRY: No depression , no anxiety  ALL OTHER  ROS negative        Vital Signs Last 24 Hrs  T(C): 36.9 (26 Sep 2022 05:03), Max: 36.9 (26 Sep 2022 05:03)  T(F): 98.4 (26 Sep 2022 05:03), Max: 98.4 (26 Sep 2022 05:03)  HR: 103 (26 Sep 2022 05:03) (86 - 103)  BP: 106/45 (26 Sep 2022 05:03) (104/52 - 109/56)  BP(mean): 75 (25 Sep 2022 14:10) (75 - 75)  RR: 17 (26 Sep 2022 05:03) (16 - 17)  SpO2: 96% (26 Sep 2022 05:03) (96% - 100%)    Parameters below as of 26 Sep 2022 05:03  Patient On (Oxygen Delivery Method): room air        ________________________________________________  PHYSICAL EXAM:  GENERAL: NAD. Obese   HEENT: Normocephalic;  conjunctivae and sclerae clear; moist mucous membranes;   NECK : Supple  CHEST/LUNG: Clear to auscultation bilaterally with good air entry   HEART: S1 S2  regular; no murmurs, gallops or rubs  ABDOMEN: Soft, Nontender, Nondistended; Hypoactive bowel sounds present x 4 quad.  No rebound or guarding noted.    EXTREMITIES: No cyanosis; no edema; no calf tenderness  SKIN: Warm and dry; no rash  NERVOUS SYSTEM:  Awake and alert; Oriented  to place, person and time ; no new deficits    _________________________________________________  LABS:                        8.2    10.91 )-----------( 439      ( 26 Sep 2022 07:25 )             25.9     09-26    134<L>  |  101  |  96<H>  ----------------------------<  86  4.1   |  18<L>  |  2.18<H>    Ca    7.5<L>      26 Sep 2022 07:25  Phos  6.0     09-  Mg     2.6     09-    TPro  5.5<L>  /  Alb  1.6<L>  /  TBili  0.6  /  DBili  0.3  /  AST  65<H>  /  ALT  45  /  AlkPhos  154<H>  09-26      Urinalysis Basic - ( 25 Sep 2022 03:17 )    Color: Yellow / Appearance: Clear / S.020 / pH: x  Gluc: x / Ketone: Negative  / Bili: Small / Urobili: Negative   Blood: x / Protein: 15 / Nitrite: Negative   Leuk Esterase: Small / RBC: 5-10 /HPF / WBC 26-50 /HPF   Sq Epi: x / Non Sq Epi: Few /HPF / Bacteria: Many /HPF      CAPILLARY BLOOD GLUCOSE            RADIOLOGY & ADDITIONAL TESTS:    Imaging Personally Reviewed:  YES    Consultant(s) Notes Reviewed:   YES    Care Discussed with Consultants :     Plan of care was discussed with patient and /or primary care giver; all questions and concerns were addressed and care was aligned with patient's wishes.     NP Note discussed with  primary attending    Patient is a 89y old  Female who presents with a chief complaint of ileus (26 Sep 2022 12:38)      INTERVAL HPI/OVERNIGHT EVENTS: Pt stated, "I have not eaten, you give me nothing to eat. "  NP asked pt if she had an appetite>  Pt stated, "I'm hungry."  NP asked pt if she had abdominal pain? Pt stated, "No no pain, I have no problem."  Pt denied HA, dizziness, SOB, CP, abdominal pain, nausea or vomiting.  "[place] Islander -Yoanna # [id number].    MEDICATIONS  (STANDING):  aspirin  chewable 81 milliGRAM(s) Oral daily  cefTRIAXone   IVPB 1000 milliGRAM(s) IV Intermittent every 24 hours  gabapentin 100 milliGRAM(s) Oral two times a day  heparin   Injectable 5000 Unit(s) SubCutaneous every 8 hours  metoprolol tartrate 25 milliGRAM(s) Oral two times a day  polyethylene glycol 3350 17 Gram(s) Oral daily  senna 2 Tablet(s) Oral at bedtime  simvastatin 40 milliGRAM(s) Oral at bedtime  sodium chloride 0.9%. 1000 milliLiter(s) (80 mL/Hr) IV Continuous <Continuous>    MEDICATIONS  (PRN):  acetaminophen     Tablet .. 650 milliGRAM(s) Oral every 6 hours PRN Mild Pain (1 - 3)      __________________________________________________  REVIEW OF SYSTEMS:    CONSTITUTIONAL: No fever  EYES: No acute visual disturbances  NECK: No pain or stiffness  RESPIRATORY: No cough; No shortness of breath  CARDIOVASCULAR: No chest pain, no palpitations  GASTROINTESTINAL: No pain. No nausea or vomiting.  No diarrhea   NEUROLOGICAL: No headache or numbness, no tremors  MUSCULOSKELETAL: No joint pain, no muscle pain  GENITOURINARY: No dysuria, no frequency, no hesitancy  PSYCHIATRY: No depression , no anxiety  ALL OTHER  ROS negative        Vital Signs Last 24 Hrs  T(C): 36.9 (26 Sep 2022 05:03), Max: 36.9 (26 Sep 2022 05:03)  T(F): 98.4 (26 Sep 2022 05:03), Max: 98.4 (26 Sep 2022 05:03)  HR: 103 (26 Sep 2022 05:03) (86 - 103)  BP: 106/45 (26 Sep 2022 05:03) (104/52 - 109/56)  BP(mean): 75 (25 Sep 2022 14:10) (75 - 75)  RR: 17 (26 Sep 2022 05:03) (16 - 17)  SpO2: 96% (26 Sep 2022 05:03) (96% - 100%)    Parameters below as of 26 Sep 2022 05:03  Patient On (Oxygen Delivery Method): room air        ________________________________________________  PHYSICAL EXAM:  GENERAL: NAD. Obese   HEENT: Normocephalic;  conjunctivae and sclerae clear; moist mucous membranes;   NECK : Supple  CHEST/LUNG: Clear to auscultation bilaterally with good air entry   HEART: S1 S2  regular; no murmurs, gallops or rubs  ABDOMEN: Soft, Nontender, Nondistended; Hypoactive bowel sounds present x 4 quad.  No rebound or guarding noted.  (+) Guardado.   EXTREMITIES: No cyanosis; Trace B/l LE edema; no calf tenderness  SKIN: Warm and dry; no rash  NERVOUS SYSTEM:  Awake and alert; Oriented  to place, person and time ; no new deficits    _________________________________________________  LABS:                        8.2    10.91 )-----------( 439      ( 26 Sep 2022 07:25 )             25.9     09-26    134<L>  |  101  |  96<H>  ----------------------------<  86  4.1   |  18<L>  |  2.18<H>    Ca    7.5<L>      26 Sep 2022 07:25  Phos  6.0     09-26  Mg     2.6     09-26    TPro  5.5<L>  /  Alb  1.6<L>  /  TBili  0.6  /  DBili  0.3  /  AST  65<H>  /  ALT  45  /  AlkPhos  154<H>  09-      Urinalysis Basic - ( 25 Sep 2022 03:17 )    Color: Yellow / Appearance: Clear / S.020 / pH: x  Gluc: x / Ketone: Negative  / Bili: Small / Urobili: Negative   Blood: x / Protein: 15 / Nitrite: Negative   Leuk Esterase: Small / RBC: 5-10 /HPF / WBC 26-50 /HPF   Sq Epi: x / Non Sq Epi: Few /HPF / Bacteria: Many /HPF      CAPILLARY BLOOD GLUCOSE            RADIOLOGY & ADDITIONAL TESTS:    Imaging Personally Reviewed:  YES    Consultant(s) Notes Reviewed:   YES    Care Discussed with Consultants :     Plan of care was discussed with patient and /or primary care giver; all questions and concerns were addressed and care was aligned with patient's wishes.     NP Note discussed with  primary attending    Patient is a 89y old  Female who presents with a chief complaint of ileus (26 Sep 2022 12:38)      INTERVAL HPI/OVERNIGHT EVENTS: Pt stated, "I have not eaten, you give me nothing to eat. "  NP asked pt if she had an appetite>  Pt stated, "I'm hungry."  NP asked pt if she had abdominal pain? Pt stated, "No no pain, I have no problem."  Pt denied HA, dizziness, SOB, CP, abdominal pain, nausea or vomiting.  "Greek -Yoanna # [id number].    MEDICATIONS  (STANDING):  aspirin  chewable 81 milliGRAM(s) Oral daily  cefTRIAXone   IVPB 1000 milliGRAM(s) IV Intermittent every 24 hours  gabapentin 100 milliGRAM(s) Oral two times a day  heparin   Injectable 5000 Unit(s) SubCutaneous every 8 hours  metoprolol tartrate 25 milliGRAM(s) Oral two times a day  polyethylene glycol 3350 17 Gram(s) Oral daily  senna 2 Tablet(s) Oral at bedtime  simvastatin 40 milliGRAM(s) Oral at bedtime  sodium chloride 0.9%. 1000 milliLiter(s) (80 mL/Hr) IV Continuous <Continuous>    MEDICATIONS  (PRN):  acetaminophen     Tablet .. 650 milliGRAM(s) Oral every 6 hours PRN Mild Pain (1 - 3)      __________________________________________________  REVIEW OF SYSTEMS:    CONSTITUTIONAL: No fever  EYES: No acute visual disturbances  NECK: No pain or stiffness  RESPIRATORY: No cough; No shortness of breath  CARDIOVASCULAR: No chest pain, no palpitations  GASTROINTESTINAL: No pain. No nausea or vomiting.  No diarrhea   NEUROLOGICAL: No headache or numbness, no tremors  MUSCULOSKELETAL: No joint pain, no muscle pain  GENITOURINARY: No dysuria, no frequency, no hesitancy  PSYCHIATRY: No depression , no anxiety  ALL OTHER  ROS negative        Vital Signs Last 24 Hrs  T(C): 36.9 (26 Sep 2022 05:03), Max: 36.9 (26 Sep 2022 05:03)  T(F): 98.4 (26 Sep 2022 05:03), Max: 98.4 (26 Sep 2022 05:03)  HR: 103 (26 Sep 2022 05:03) (86 - 103)  BP: 106/45 (26 Sep 2022 05:03) (104/52 - 109/56)  BP(mean): 75 (25 Sep 2022 14:10) (75 - 75)  RR: 17 (26 Sep 2022 05:03) (16 - 17)  SpO2: 96% (26 Sep 2022 05:03) (96% - 100%)    Parameters below as of 26 Sep 2022 05:03  Patient On (Oxygen Delivery Method): room air        ________________________________________________  PHYSICAL EXAM:  GENERAL: NAD. Obese   HEENT: Normocephalic;  conjunctivae and sclerae clear; moist mucous membranes;   NECK : Supple  CHEST/LUNG: Clear to auscultation bilaterally with good air entry   HEART: S1 S2  regular; no murmurs, gallops or rubs  ABDOMEN: Soft, Nontender, Nondistended; Hypoactive bowel sounds present x 4 quad.  No rebound or guarding noted.  (+) Guardado.   EXTREMITIES: No cyanosis; Trace B/l LE edema; no calf tenderness.  Right hip staples from 9/15 repair   SKIN: Warm and dry; no rash  NERVOUS SYSTEM:  Awake and alert; Oriented  to place, person and time ; no new deficits    _________________________________________________  LABS:                        8.2    10.91 )-----------( 439      ( 26 Sep 2022 07:25 )             25.9     09-26    134<L>  |  101  |  96<H>  ----------------------------<  86  4.1   |  18<L>  |  2.18<H>    Ca    7.5<L>      26 Sep 2022 07:25  Phos  6.0     09-  Mg     2.6     09-    TPro  5.5<L>  /  Alb  1.6<L>  /  TBili  0.6  /  DBili  0.3  /  AST  65<H>  /  ALT  45  /  AlkPhos  154<H>  09-      Urinalysis Basic - ( 25 Sep 2022 03:17 )    Color: Yellow / Appearance: Clear / S.020 / pH: x  Gluc: x / Ketone: Negative  / Bili: Small / Urobili: Negative   Blood: x / Protein: 15 / Nitrite: Negative   Leuk Esterase: Small / RBC: 5-10 /HPF / WBC 26-50 /HPF   Sq Epi: x / Non Sq Epi: Few /HPF / Bacteria: Many /HPF      CAPILLARY BLOOD GLUCOSE            RADIOLOGY & ADDITIONAL TESTS:    Imaging Personally Reviewed:  YES    Consultant(s) Notes Reviewed:   YES    Care Discussed with Consultants :     Plan of care was discussed with patient and /or primary care giver; all questions and concerns were addressed and care was aligned with patient's wishes.

## 2022-09-26 NOTE — PROGRESS NOTE ADULT - PROBLEM SELECTOR PLAN 5
p/w bilateral leg edema and pain  could be in the setting of post-obstructive RAMSEY  f/u Venous Doppler - P/w abd distention and elevated LFT  - AST/ALT - 94/75  - ALP - 201  - CT A/P normal liver  - could be in the setting of fluid overload  - Continue to monitor CMP in AM-f/u results

## 2022-09-26 NOTE — PHYSICAL THERAPY INITIAL EVALUATION ADULT - RANGE OF MOTION EXAMINATION, REHAB EVAL
A-AAROM - reduced at end range due to moderate swelling, weakness and S/P Right IM nailing/bilateral lower extremity ROM was WFL (within functional limits)

## 2022-09-26 NOTE — PHYSICAL THERAPY INITIAL EVALUATION ADULT - PERTINENT HX OF CURRENT PROBLEM, REHAB EVAL
Patient is a 89 y/o F, Indonesian-speaking from [facility]. She has a PMHx of HTN, HLD, s/p R hip surgery (Intramedullary nailing of right hip done 9/15/22), osteoporosis hospitalized due to ileus.

## 2022-09-26 NOTE — PROGRESS NOTE ADULT - PROBLEM SELECTOR PLAN 8
Hx of HTN  home med - metoprolol tartrate  BP monitoring  continue home meds - Hx of R hip fracture (s/p R hip surgery in 9/15/22)  - Was on Eliquis for DVT prophylaxis.  Change to Heparin in setting of RAMSEY  - C/w supportive care  - C/w pain meds as needed but avoid opioids due to ileus - Hx of R hip fracture (s/p R hip surgery in 9/15/22)  - Was on Eliquis for DVT prophylaxis.  Change to Heparin in setting of RAMSEY  - C/w supportive care  - C/w pain meds as needed but avoid opioids due to ileus  -  As discussed w/ Attending.  Pt to have staples removed 14d post sx.  Therefore, staples to be removed 9/29.

## 2022-09-26 NOTE — PROGRESS NOTE ADULT - PROBLEM SELECTOR PLAN 9
Hx of HLD  home meds - simvastatin  continue home meds H/o of HTN, home med Metoprolol tartrate  - C/w Metoprolol  - Continue to monitor BP

## 2022-09-26 NOTE — PROGRESS NOTE ADULT - PROBLEM SELECTOR PLAN 3
noted to have dysuria and urinary retention  CT Abd/Pelvis showed moderately distended bladder, mild bilateral hydronephrosis  valle catheter placed in the vagina, replace  urine output monitored  clamp IFC if output >1L - P/w dysuria and urinary retention.  SCr=3.01  - CT Abd/Pelvis showed moderately distended bladder, mild bilateral hydronephrosis  - Continue to monitor urine output   - Continue to monitor CMP in AM-f/u results

## 2022-09-27 LAB
ALBUMIN SERPL ELPH-MCNC: 1.7 G/DL — LOW (ref 3.5–5)
ALP SERPL-CCNC: 151 U/L — HIGH (ref 40–120)
ALT FLD-CCNC: 39 U/L DA — SIGNIFICANT CHANGE UP (ref 10–60)
ANION GAP SERPL CALC-SCNC: 10 MMOL/L — SIGNIFICANT CHANGE UP (ref 5–17)
AST SERPL-CCNC: 48 U/L — HIGH (ref 10–40)
BILIRUB SERPL-MCNC: 0.5 MG/DL — SIGNIFICANT CHANGE UP (ref 0.2–1.2)
BUN SERPL-MCNC: 91 MG/DL — HIGH (ref 7–18)
CALCIUM SERPL-MCNC: 7.6 MG/DL — LOW (ref 8.4–10.5)
CHLORIDE SERPL-SCNC: 105 MMOL/L — SIGNIFICANT CHANGE UP (ref 96–108)
CO2 SERPL-SCNC: 21 MMOL/L — LOW (ref 22–31)
CREAT SERPL-MCNC: 1.56 MG/DL — HIGH (ref 0.5–1.3)
EGFR: 31 ML/MIN/1.73M2 — LOW
GLUCOSE SERPL-MCNC: 112 MG/DL — HIGH (ref 70–99)
HCT VFR BLD CALC: 26.6 % — LOW (ref 34.5–45)
HGB BLD-MCNC: 8.5 G/DL — LOW (ref 11.5–15.5)
MAGNESIUM SERPL-MCNC: 2.2 MG/DL — SIGNIFICANT CHANGE UP (ref 1.6–2.6)
MCHC RBC-ENTMCNC: 31.3 PG — SIGNIFICANT CHANGE UP (ref 27–34)
MCHC RBC-ENTMCNC: 32 GM/DL — SIGNIFICANT CHANGE UP (ref 32–36)
MCV RBC AUTO: 97.8 FL — SIGNIFICANT CHANGE UP (ref 80–100)
NRBC # BLD: 0 /100 WBCS — SIGNIFICANT CHANGE UP (ref 0–0)
PHOSPHATE SERPL-MCNC: 4.8 MG/DL — HIGH (ref 2.5–4.5)
PLATELET # BLD AUTO: 488 K/UL — HIGH (ref 150–400)
POTASSIUM SERPL-MCNC: 3.9 MMOL/L — SIGNIFICANT CHANGE UP (ref 3.5–5.3)
POTASSIUM SERPL-SCNC: 3.9 MMOL/L — SIGNIFICANT CHANGE UP (ref 3.5–5.3)
PROT SERPL-MCNC: 5.7 G/DL — LOW (ref 6–8.3)
RBC # BLD: 2.72 M/UL — LOW (ref 3.8–5.2)
RBC # FLD: 14.6 % — HIGH (ref 10.3–14.5)
SODIUM SERPL-SCNC: 136 MMOL/L — SIGNIFICANT CHANGE UP (ref 135–145)
WBC # BLD: 10.4 K/UL — SIGNIFICANT CHANGE UP (ref 3.8–10.5)
WBC # FLD AUTO: 10.4 K/UL — SIGNIFICANT CHANGE UP (ref 3.8–10.5)

## 2022-09-27 RX ORDER — ASCORBIC ACID 60 MG
500 TABLET,CHEWABLE ORAL DAILY
Refills: 0 | Status: DISCONTINUED | OUTPATIENT
Start: 2022-09-27 | End: 2022-10-01

## 2022-09-27 RX ADMIN — Medication 650 MILLIGRAM(S): at 23:09

## 2022-09-27 RX ADMIN — HEPARIN SODIUM 5000 UNIT(S): 5000 INJECTION INTRAVENOUS; SUBCUTANEOUS at 05:15

## 2022-09-27 RX ADMIN — Medication 500 MILLIGRAM(S): at 15:17

## 2022-09-27 RX ADMIN — Medication 650 MILLIGRAM(S): at 16:23

## 2022-09-27 RX ADMIN — Medication 650 MILLIGRAM(S): at 08:19

## 2022-09-27 RX ADMIN — SIMVASTATIN 40 MILLIGRAM(S): 20 TABLET, FILM COATED ORAL at 22:39

## 2022-09-27 RX ADMIN — SODIUM CHLORIDE 80 MILLILITER(S): 9 INJECTION INTRAMUSCULAR; INTRAVENOUS; SUBCUTANEOUS at 05:15

## 2022-09-27 RX ADMIN — Medication 25 MILLIGRAM(S): at 17:58

## 2022-09-27 RX ADMIN — GABAPENTIN 100 MILLIGRAM(S): 400 CAPSULE ORAL at 05:15

## 2022-09-27 RX ADMIN — HEPARIN SODIUM 5000 UNIT(S): 5000 INJECTION INTRAVENOUS; SUBCUTANEOUS at 15:16

## 2022-09-27 RX ADMIN — SENNA PLUS 2 TABLET(S): 8.6 TABLET ORAL at 22:39

## 2022-09-27 RX ADMIN — Medication 25 MILLIGRAM(S): at 05:15

## 2022-09-27 RX ADMIN — POLYETHYLENE GLYCOL 3350 17 GRAM(S): 17 POWDER, FOR SOLUTION ORAL at 12:16

## 2022-09-27 RX ADMIN — Medication 650 MILLIGRAM(S): at 18:52

## 2022-09-27 RX ADMIN — Medication 650 MILLIGRAM(S): at 22:39

## 2022-09-27 RX ADMIN — GABAPENTIN 100 MILLIGRAM(S): 400 CAPSULE ORAL at 17:58

## 2022-09-27 RX ADMIN — HEPARIN SODIUM 5000 UNIT(S): 5000 INJECTION INTRAVENOUS; SUBCUTANEOUS at 22:39

## 2022-09-27 RX ADMIN — Medication 81 MILLIGRAM(S): at 12:33

## 2022-09-27 NOTE — PROGRESS NOTE ADULT - PROBLEM SELECTOR PLAN 5
- P/w abd distention and elevated LFT  - AST/ALT - 94/75  - ALP - 201  - CT A/P normal liver  - Could be in the setting of fluid overload  - Continue to monitor CMP in AM-f/u results - P/w abd distention and elevated LFT  - Improving   - AST - downtrending   - ALP - downtrending   - ALT normalized   - CT A/P normal liver  - Could be in the setting of fluid overload  - Continue to monitor CMP in AM-f/u results

## 2022-09-27 NOTE — DIETITIAN INITIAL EVALUATION ADULT - NSFNSPHYEXAMSKINFT_GEN_A_CORE
Pressure Injury 1: Bilateral:,buttocks, Suspected deep tissue injury  Pressure Injury 2: Bilateral:,heel, Suspected deep tissue injury  Pressure Injury 3: sacrum, Suspected deep tissue injury

## 2022-09-27 NOTE — DIETITIAN INITIAL EVALUATION ADULT - FACTORS AFF FOOD INTAKE
weakness, edema. pain, Ileus, RAMSEY, osteoporosis, s/p Right hip surgery (09/15/22)/difficulty with food procurement/preparation/persistent constipation/vomiting/other (specify)

## 2022-09-27 NOTE — DIETITIAN INITIAL EVALUATION ADULT - PERTINENT MEDS FT
MEDICATIONS  (STANDING):  aspirin  chewable 81 milliGRAM(s) Oral daily  gabapentin 100 milliGRAM(s) Oral two times a day  heparin   Injectable 5000 Unit(s) SubCutaneous every 8 hours  metoprolol tartrate 25 milliGRAM(s) Oral two times a day  polyethylene glycol 3350 17 Gram(s) Oral daily  senna 2 Tablet(s) Oral at bedtime  simvastatin 40 milliGRAM(s) Oral at bedtime  sodium chloride 0.9%. 1000 milliLiter(s) (80 mL/Hr) IV Continuous <Continuous>    MEDICATIONS  (PRN):  acetaminophen     Tablet .. 650 milliGRAM(s) Oral every 6 hours PRN Mild Pain (1 - 3)  bisacodyl 5 milliGRAM(s) Oral every 12 hours PRN Constipation

## 2022-09-27 NOTE — PROGRESS NOTE ADULT - PROBLEM SELECTOR PLAN 6
- P/w bilateral leg edema and pain  - Could be in the setting of post-obstructive RAMSEY  - LE US negative for DVT

## 2022-09-27 NOTE — CHART NOTE - NSCHARTNOTEFT_GEN_A_CORE
NP called pt's granddtr-Tiara but VM.  NP left VM stating Tiara/family could bring chicken soup to the hospital lobby and staff will bring soup to pt.      NP left floor number for callback. NP called pt's granddtr-Tiara but VM.  NP left VM stating Tiara/family could bring chicken soup to the hospital lobby and staff would bring soup to pt.      NP left floor number for callback.

## 2022-09-27 NOTE — PROGRESS NOTE ADULT - ASSESSMENT
89yo F PMHx of HTN, HLD, osteoporosis, recent R hip repair / resolving RAMSEY presenting with ileus and recurrent RAMSEY. Nephrology consulted for Elevated serum creatinine.      1. RAMSEY- unknown baseline SCr; sadie SCr 1.2-1.3. RAMSEY likely partially due to obstruction given urinary retention with hydronephrosis/ pre-renal component. Renal function now improving with IVF; UA active in the setting of UTI; Finished Ceftriaxone. FENa+ 0.17%- consistent wit pre-renal RAMSEY. c/w IVF. Strict I/Os. Avoid nephrotoxins/ NSAIDs/ RCA. Monitor BMP.  2. Hydronephrosis 2/2 urinary retention- continue valle for now.  TOV when able.  Abx per primary team. Check Renal US to assess for resolution of hydronephrosis in the future.   3. Hyponatremia- hypovolemic- resolved with NS IVF. c/w istotonic IVF as above.  4. LE edema due in part to DVTs, in part due to hypoalbuminemia in the setting of PCM with decreased po intake and also illness with covid-19.  AC per primary team.  Recc protein supplements.   Spot urine protein/creatinine ratio 0.7; non nephrotic range. NO DIURETICS as pt is intravascularly volume deplete.          [facility] NEPHROLOGY  Sai Moreland M.D.  Matt Mcguire D.O.  Lo Tucker M.D.  Charlette Hobbs, MSN, ANP-C    Telephone: [phone]  Facsimile: [phone]    [address] [place] [address]

## 2022-09-27 NOTE — DIETITIAN INITIAL EVALUATION ADULT - OTHER INFO
Patient from [facility] admitted for Ileus. Visited pt. alert but with "mild pain", PCA at bedside attending to pt. needs, having BM, received laxatives, tolerated clear liquids, intake ~50% & pt. "want to solid foods"? noted, plans to advanced diet dosing wt. 179.8 Lbs at 09/24/22, Nephro/team following, ongoing wound care, Surgery following, d/w NP.

## 2022-09-27 NOTE — PROGRESS NOTE ADULT - PROBLEM SELECTOR PLAN 8
- Hx of R hip fracture (s/p R hip surgery in 9/15/22)  - Was on Eliquis for DVT prophylaxis.  Change to Heparin in setting of RAMSEY  - C/w supportive care  - C/w pain meds as needed but avoid opioids due to ileus  -  As discussed w/ Attending.  Pt to have staples removed 14d post sx.  Therefore, staples to be removed 9/29.

## 2022-09-27 NOTE — DIETITIAN INITIAL EVALUATION ADULT - DIET TYPE
once diet advance rec.  as medically feasible/DASH/TLC (sodium and cholesterol restricted diet)/no concentrated potassium/no concentrated phosphorus/easy to chew

## 2022-09-27 NOTE — PROGRESS NOTE ADULT - PROBLEM SELECTOR PLAN 1
- P/w chronic constipation and ileus  - Possibly in the setting of post-op anesthesia vs. opioid induced constipation  - Abd XR (9/23) as outpatient showed generalized ileus  - S/p CT A/P  - 9/26 Diet advanced to clear and tolerated.  9/27 Diet advanced to Full liquid.  Continue to advance as tolerated  - S/p enema   - Continue to monitor for BM  - Continue to avoid opioids for pain regimen - P/w chronic constipation and ileus  - Possibly in the setting of post-op anesthesia vs. opioid induced constipation  - Abd XR (9/23) as outpatient showed generalized ileus  - S/p CT A/P  - 9/26 Diet advanced to clear and tolerated.  9/27 Diet advanced to Full liquid.  Continue to advance as tolerated  - S/p enema   - 1 reported large BM today.  Continue to monitor for BM  - Continue to avoid opioids for pain regimen

## 2022-09-27 NOTE — DIETITIAN INITIAL EVALUATION ADULT - PERTINENT LABORATORY DATA
09-27    136  |  105  |  91<H>  ----------------------------<  112<H>  3.9   |  21<L>  |  1.56<H>    Ca    7.6<L>      27 Sep 2022 07:00  Phos  4.8     09-27  Mg     2.2     09-27    TPro  5.7<L>  /  Alb  1.7<L>  /  TBili  0.5  /  DBili  x   /  AST  48<H>  /  ALT  39  /  AlkPhos  151<H>  09-27

## 2022-09-27 NOTE — PROGRESS NOTE ADULT - SUBJECTIVE AND OBJECTIVE BOX
[facility] NEPHROLOGY- PROGRESS NOTE    89yo F PMHx of HTN, HLD, osteoporosis, recent R hip repair / resolving RAMSEY presenting with ileus and recurrent RAMSEY. Nephrology consulted for Elevated serum creatinine.  +COVID 19    Hospital Medications: Medications reviewed.  REVIEW OF SYSTEMS:  CONSTITUTIONAL: No fevers or chills  RESPIRATORY: No shortness of breath  CARDIOVASCULAR: No chest pain.  GASTROINTESTINAL: No nausea, vomiting, diarrhea or abdominal pain.   VASCULAR: No bilateral lower extremity edema.  +Rt hip pain    VITALS:  T(F): 98.7 (22 @ 05:10), Max: 98.7 (22 @ 20:20)  HR: 99 (22 @ 05:10)  BP: 117/44 (22 @ 05:10)  RR: 17 (22 @ 05:10)  SpO2: 97% (22 @ 05:10)  Wt(kg): --     @ 07:01  -   @ 07:00  --------------------------------------------------------  IN: 0 mL / OUT: 1200 mL / NET: -1200 mL        PHYSICAL EXAM:  Constitutional: NAD  Neurological: Awake and Alert  HEENT: anicteric sclera,   Respiratory: CTAB, no wheezes, rales or rhonchi  Cardiovascular: S1, S2, RRR  Gastrointestinal: BS+, soft, NT/ND  : +valle.  Extremities: No peripheral edema    LABS:      136  |  105  |  91<H>  ----------------------------<  112<H>  3.9   |  21<L>  |  1.56<H>    Ca    7.6<L>      27 Sep 2022 07:00  Phos  4.8       Mg     2.2         TPro  5.7<L>  /  Alb  1.7<L>  /  TBili  0.5  /  DBili      /  AST  48<H>  /  ALT  39  /  AlkPhos  151<H>      Creatinine Trend: 1.56 <--, 2.18 <--, 2.91 <--, 3.01 <--                        8.5    10.40 )-----------( 488      ( 27 Sep 2022 07:00 )             26.6     Urine Studies:  Urinalysis Basic - ( 25 Sep 2022 03:17 )    Color: Yellow / Appearance: Clear / S.020 / pH:   Gluc:  / Ketone: Negative  / Bili: Small / Urobili: Negative   Blood:  / Protein: 15 / Nitrite: Negative   Leuk Esterase: Small / RBC: 5-10 /HPF / WBC 26-50 /HPF   Sq Epi:  / Non Sq Epi: Few /HPF / Bacteria: Many /HPF      Creatinine, Random Urine: 54 mg/dL ( @ 13:33)  Sodium, Random Urine: 8 mmol/L ( @ 03:17)  Creatinine, Random Urine: 111 mg/dL ( @ 03:17)  Osmolality, Random Urine: 357 mos/kg ( @ 03:17)  Potassium, Random Urine: 42 mmol/L ( @ 03:17)

## 2022-09-27 NOTE — PROGRESS NOTE ADULT - SUBJECTIVE AND OBJECTIVE BOX
NP Note discussed with  primary attending    Patient is a 89y old  Female who presents with a chief complaint of ileus (27 Sep 2022 12:06)      INTERVAL HPI/OVERNIGHT EVENTS: Pt denies abdominal pain, "No pain."  Denies abdominal pain or nausea after liquid diet.  No new complaints or concerns.  Jessee -Lexis # [id number].    MEDICATIONS  (STANDING):  ascorbic acid 500 milliGRAM(s) Oral daily  aspirin  chewable 81 milliGRAM(s) Oral daily  gabapentin 100 milliGRAM(s) Oral two times a day  heparin   Injectable 5000 Unit(s) SubCutaneous every 8 hours  metoprolol tartrate 25 milliGRAM(s) Oral two times a day  Nephro-dl 1 Tablet(s) Oral daily  polyethylene glycol 3350 17 Gram(s) Oral daily  senna 2 Tablet(s) Oral at bedtime  simvastatin 40 milliGRAM(s) Oral at bedtime  sodium chloride 0.9%. 1000 milliLiter(s) (80 mL/Hr) IV Continuous <Continuous>    MEDICATIONS  (PRN):  acetaminophen     Tablet .. 650 milliGRAM(s) Oral every 6 hours PRN Mild Pain (1 - 3)  bisacodyl 5 milliGRAM(s) Oral every 12 hours PRN Constipation      __________________________________________________  REVIEW OF SYSTEMS:    CONSTITUTIONAL: No fever  EYES: No acute visual disturbances  NECK: No pain or stiffness  RESPIRATORY: No cough; No shortness of breath  CARDIOVASCULAR: No chest pain, no palpitations  GASTROINTESTINAL: No pain. No nausea or vomiting.  No diarrhea   NEUROLOGICAL: No headache or numbness, no tremors  MUSCULOSKELETAL: No joint pain, no muscle pain  GENITOURINARY: No dysuria, no frequency, no hesitancy  PSYCHIATRY: No depression , no anxiety  ALL OTHER  ROS negative      Vital Signs Last 24 Hrs  T(C): 37.1 (27 Sep 2022 05:10), Max: 37.1 (26 Sep 2022 20:20)  T(F): 98.7 (27 Sep 2022 05:10), Max: 98.7 (26 Sep 2022 20:20)  HR: 99 (27 Sep 2022 05:10) (90 - 103)  BP: 117/44 (27 Sep 2022 05:10) (95/59 - 117/44)  BP(mean): 64 (27 Sep 2022 05:10) (52 - 64)  RR: 17 (27 Sep 2022 05:10) (17 - 18)  SpO2: 97% (27 Sep 2022 05:10) (95% - 97%)    Parameters below as of 27 Sep 2022 05:10  Patient On (Oxygen Delivery Method): room air        ________________________________________________  PHYSICAL EXAM:  GENERAL: NAD. Obese   HEENT: Normocephalic;  conjunctivae and sclerae clear; moist mucous membranes;   NECK : Supple  CHEST/LUNG: Clear to auscultation bilaterally with good air entry   HEART: S1 S2  regular; no murmurs, gallops or rubs  ABDOMEN: Soft, Nontender, Nondistended; More active bowel sounds present x 4 quad.  No rebound or guarding noted.  (+) Guardado.   EXTREMITIES: No cyanosis; No edema; no calf tenderness.  Right hip staples from 9/15 repair   SKIN: Warm and dry; no rash  NERVOUS SYSTEM:  Awake and alert; Oriented  to place, person and time ; no new deficits  _________________________________________________  LABS:                        8.5    10.40 )-----------( 488      ( 27 Sep 2022 07:00 )             26.6     09-27    136  |  105  |  91<H>  ----------------------------<  112<H>  3.9   |  21<L>  |  1.56<H>    Ca    7.6<L>      27 Sep 2022 07:00  Phos  4.8     09-27  Mg     2.2     09-27    TPro  5.7<L>  /  Alb  1.7<L>  /  TBili  0.5  /  DBili  x   /  AST  48<H>  /  ALT  39  /  AlkPhos  151<H>  09-27        CAPILLARY BLOOD GLUCOSE            RADIOLOGY & ADDITIONAL TESTS:    Imaging Personally Reviewed:  YES    Consultant(s) Notes Reviewed:   YES    Care Discussed with Consultants :     Plan of care was discussed with patient and /or primary care giver; all questions and concerns were addressed and care was aligned with patient's wishes.

## 2022-09-27 NOTE — PROGRESS NOTE ADULT - PROBLEM SELECTOR PLAN 3
- P/w dysuria and urinary retention.  SCr=3.01  - CT Abd/Pelvis showed moderately distended bladder, mild bilateral hydronephrosis  - Continue to monitor urine output   - Continue to monitor CMP in AM-f/u results

## 2022-09-27 NOTE — DIETITIAN INITIAL EVALUATION ADULT - ORAL INTAKE PTA/DIET HISTORY
NH diet order - No added salt, low cholesterol, regular consistency with thin liquids. Ensure Plus, 8oz once daily at 10.00 am.

## 2022-09-27 NOTE — PROGRESS NOTE ADULT - PROBLEM SELECTOR PLAN 2
- Most likely pre-renal d/t decreased PO intake   - C/w IVF & Guardado   - Continue to avoid nephrotoxic medications  - Continue to monitor CMP in AM-f/u results  - Renal US pending-f/u results  - Nephro-Dr. Tucker consulted, appreciated - Most likely pre-renal d/t decreased PO intake   - C/w IVF & Guardado   - Continue to avoid nephrotoxic medications  - Continue to monitor CMP in AM-f/u results  - Renal US pending-f/u results  - Nephro-Dr. Moreland/Dr. Tucker consulted, appreciated

## 2022-09-28 DIAGNOSIS — Z02.9 ENCOUNTER FOR ADMINISTRATIVE EXAMINATIONS, UNSPECIFIED: ICD-10-CM

## 2022-09-28 LAB
ALBUMIN SERPL ELPH-MCNC: 1.9 G/DL — LOW (ref 3.5–5)
ALP SERPL-CCNC: 150 U/L — HIGH (ref 40–120)
ALT FLD-CCNC: 30 U/L DA — SIGNIFICANT CHANGE UP (ref 10–60)
ANION GAP SERPL CALC-SCNC: 9 MMOL/L — SIGNIFICANT CHANGE UP (ref 5–17)
AST SERPL-CCNC: 37 U/L — SIGNIFICANT CHANGE UP (ref 10–40)
BILIRUB SERPL-MCNC: 0.5 MG/DL — SIGNIFICANT CHANGE UP (ref 0.2–1.2)
BUN SERPL-MCNC: 73 MG/DL — HIGH (ref 7–18)
CALCIUM SERPL-MCNC: 8.5 MG/DL — SIGNIFICANT CHANGE UP (ref 8.4–10.5)
CHLORIDE SERPL-SCNC: 109 MMOL/L — HIGH (ref 96–108)
CO2 SERPL-SCNC: 22 MMOL/L — SIGNIFICANT CHANGE UP (ref 22–31)
CREAT SERPL-MCNC: 1.31 MG/DL — HIGH (ref 0.5–1.3)
EGFR: 38 ML/MIN/1.73M2 — LOW
GLUCOSE BLDC GLUCOMTR-MCNC: 124 MG/DL — HIGH (ref 70–99)
GLUCOSE SERPL-MCNC: 125 MG/DL — HIGH (ref 70–99)
HCT VFR BLD CALC: 30.9 % — LOW (ref 34.5–45)
HGB BLD-MCNC: 9.7 G/DL — LOW (ref 11.5–15.5)
MAGNESIUM SERPL-MCNC: 2.4 MG/DL — SIGNIFICANT CHANGE UP (ref 1.6–2.6)
MCHC RBC-ENTMCNC: 31.2 PG — SIGNIFICANT CHANGE UP (ref 27–34)
MCHC RBC-ENTMCNC: 31.4 GM/DL — LOW (ref 32–36)
MCV RBC AUTO: 99.4 FL — SIGNIFICANT CHANGE UP (ref 80–100)
NRBC # BLD: 0 /100 WBCS — SIGNIFICANT CHANGE UP (ref 0–0)
PHOSPHATE SERPL-MCNC: 4 MG/DL — SIGNIFICANT CHANGE UP (ref 2.5–4.5)
PLATELET # BLD AUTO: 504 K/UL — HIGH (ref 150–400)
POTASSIUM SERPL-MCNC: 4.1 MMOL/L — SIGNIFICANT CHANGE UP (ref 3.5–5.3)
POTASSIUM SERPL-SCNC: 4.1 MMOL/L — SIGNIFICANT CHANGE UP (ref 3.5–5.3)
PROT SERPL-MCNC: 5.6 G/DL — LOW (ref 6–8.3)
RBC # BLD: 3.11 M/UL — LOW (ref 3.8–5.2)
RBC # FLD: 14.7 % — HIGH (ref 10.3–14.5)
SODIUM SERPL-SCNC: 140 MMOL/L — SIGNIFICANT CHANGE UP (ref 135–145)
WBC # BLD: 10.11 K/UL — SIGNIFICANT CHANGE UP (ref 3.8–10.5)
WBC # FLD AUTO: 10.11 K/UL — SIGNIFICANT CHANGE UP (ref 3.8–10.5)

## 2022-09-28 PROCEDURE — 76775 US EXAM ABDO BACK WALL LIM: CPT | Mod: 26

## 2022-09-28 PROCEDURE — 99233 SBSQ HOSP IP/OBS HIGH 50: CPT

## 2022-09-28 RX ORDER — HYDROMORPHONE HYDROCHLORIDE 2 MG/ML
2 INJECTION INTRAMUSCULAR; INTRAVENOUS; SUBCUTANEOUS EVERY 6 HOURS
Refills: 0 | Status: DISCONTINUED | OUTPATIENT
Start: 2022-09-28 | End: 2022-10-01

## 2022-09-28 RX ORDER — SODIUM CHLORIDE 9 MG/ML
1000 INJECTION INTRAMUSCULAR; INTRAVENOUS; SUBCUTANEOUS
Refills: 0 | Status: DISCONTINUED | OUTPATIENT
Start: 2022-09-28 | End: 2022-09-30

## 2022-09-28 RX ORDER — ACETAMINOPHEN 500 MG
650 TABLET ORAL EVERY 6 HOURS
Refills: 0 | Status: DISCONTINUED | OUTPATIENT
Start: 2022-09-28 | End: 2022-10-01

## 2022-09-28 RX ADMIN — Medication 81 MILLIGRAM(S): at 12:05

## 2022-09-28 RX ADMIN — Medication 25 MILLIGRAM(S): at 05:44

## 2022-09-28 RX ADMIN — Medication 650 MILLIGRAM(S): at 06:14

## 2022-09-28 RX ADMIN — Medication 650 MILLIGRAM(S): at 19:00

## 2022-09-28 RX ADMIN — HYDROMORPHONE HYDROCHLORIDE 2 MILLIGRAM(S): 2 INJECTION INTRAMUSCULAR; INTRAVENOUS; SUBCUTANEOUS at 15:11

## 2022-09-28 RX ADMIN — SODIUM CHLORIDE 80 MILLILITER(S): 9 INJECTION INTRAMUSCULAR; INTRAVENOUS; SUBCUTANEOUS at 12:06

## 2022-09-28 RX ADMIN — Medication 650 MILLIGRAM(S): at 05:44

## 2022-09-28 RX ADMIN — HEPARIN SODIUM 5000 UNIT(S): 5000 INJECTION INTRAVENOUS; SUBCUTANEOUS at 23:42

## 2022-09-28 RX ADMIN — HEPARIN SODIUM 5000 UNIT(S): 5000 INJECTION INTRAVENOUS; SUBCUTANEOUS at 05:45

## 2022-09-28 RX ADMIN — HYDROMORPHONE HYDROCHLORIDE 2 MILLIGRAM(S): 2 INJECTION INTRAMUSCULAR; INTRAVENOUS; SUBCUTANEOUS at 14:16

## 2022-09-28 RX ADMIN — GABAPENTIN 100 MILLIGRAM(S): 400 CAPSULE ORAL at 18:04

## 2022-09-28 RX ADMIN — Medication 25 MILLIGRAM(S): at 18:05

## 2022-09-28 RX ADMIN — POLYETHYLENE GLYCOL 3350 17 GRAM(S): 17 POWDER, FOR SOLUTION ORAL at 14:17

## 2022-09-28 RX ADMIN — SIMVASTATIN 40 MILLIGRAM(S): 20 TABLET, FILM COATED ORAL at 23:37

## 2022-09-28 RX ADMIN — Medication 1 TABLET(S): at 12:05

## 2022-09-28 RX ADMIN — HEPARIN SODIUM 5000 UNIT(S): 5000 INJECTION INTRAVENOUS; SUBCUTANEOUS at 14:17

## 2022-09-28 RX ADMIN — Medication 650 MILLIGRAM(S): at 12:05

## 2022-09-28 RX ADMIN — Medication 650 MILLIGRAM(S): at 13:20

## 2022-09-28 RX ADMIN — Medication 500 MILLIGRAM(S): at 12:06

## 2022-09-28 RX ADMIN — Medication 650 MILLIGRAM(S): at 18:04

## 2022-09-28 RX ADMIN — GABAPENTIN 100 MILLIGRAM(S): 400 CAPSULE ORAL at 05:44

## 2022-09-28 RX ADMIN — Medication 650 MILLIGRAM(S): at 23:34

## 2022-09-28 NOTE — PROGRESS NOTE ADULT - PROBLEM SELECTOR PLAN 5
- P/w abd distention and elevated LFT  - Improving   - AST - downtrending   - ALP - downtrending   - ALT normalized   - CT A/P normal liver  - Could be in the setting of fluid overload  - Continue to monitor CMP in AM-f/u results

## 2022-09-28 NOTE — PROGRESS NOTE ADULT - ASSESSMENT
89yo F PMHx of HTN, HLD, osteoporosis, recent R hip repair / resolving RAMSEY presenting with ileus and recurrent RAMSEY. Nephrology consulted for Elevated serum creatinine.      1. RAMSEY- unknown baseline SCr; sadie SCr 1.2-1.3. RAMSEY likely partially due to obstruction given urinary retention with hydronephrosis/ pre-renal component. Renal function now improving with IVF; UA active in the setting of UTI; Finished Ceftriaxone. FENa+ 0.17%- consistent wit pre-renal RAMSEY. c/w IVF. Encourage PO fluid intake. Strict I/Os. Avoid nephrotoxins/ NSAIDs/ RCA. Monitor BMP.  2. Hydronephrosis 2/2 urinary retention- continue valle for now.   Abx per primary team. Renal US with persistent b/l mild hydro.   3. Hyponatremia- hypovolemic- resolved with NS IVF. c/w istotonic IVF as above.  4. LE edema due in part to DVTs, in part due to hypoalbuminemia in the setting of PCM with decreased po intake and also illness with covid-19.  AC per primary team.  c/w Nepro protein supplements.   Spot urine protein/creatinine ratio 0.7; non nephrotic range. NO DIURETICS as pt is intravascularly volume deplete.          [facility] NEPHROLOGY  Sai Moreland M.D.  Matt Mcguire D.O.  Lo Tucker M.D.  Charlette Hobbs, MSN, ANP-C    Telephone: [phone]  Facsimile: [phone]    [address] [place] [address]

## 2022-09-28 NOTE — PROGRESS NOTE ADULT - SUBJECTIVE AND OBJECTIVE BOX
[facility] NEPHROLOGY- PROGRESS NOTE    89yo F PMHx of HTN, HLD, osteoporosis, recent R hip repair / resolving RAMSEY presenting with ileus and recurrent RAMSEY. Nephrology consulted for Elevated serum creatinine.  +COVID 19    Hospital Medications: Medications reviewed.  REVIEW OF SYSTEMS:  CONSTITUTIONAL: No fevers or chills  RESPIRATORY: No shortness of breath  CARDIOVASCULAR: No chest pain.  GASTROINTESTINAL: No nausea, vomiting, diarrhea or abdominal pain.   VASCULAR: No bilateral lower extremity edema.  +Rt hip pain    VITALS:  T(F): 98.4 (22 @ 13:45), Max: 98.4 (22 @ 13:45)  HR: 74 (22 @ 13:45)  BP: 119/59 (22 @ 13:45)  RR: 161 (22 @ 13:45)  SpO2: 98% (22 @ 13:45)  Wt(kg): --     @ 07:01  -   @ 07:00  --------------------------------------------------------  IN: 0 mL / OUT: 1450 mL / NET: -1450 mL      PHYSICAL EXAM:  Constitutional: NAD  Neurological: Awake and Alert  HEENT: anicteric sclera,   Respiratory: CTAB, no wheezes, rales or rhonchi  Cardiovascular: S1, S2, RRR  Gastrointestinal: BS+, soft, NT/ND  : +valle.  Extremities: No peripheral edema    LABS:      140  |  109<H>  |  73<H>  ----------------------------<  125<H>  4.1   |  22  |  1.31<H>    Ca    8.5      28 Sep 2022 06:34  Phos  4.0       Mg     2.4         TPro  5.6<L>  /  Alb  1.9<L>  /  TBili  0.5  /  DBili      /  AST  37  /  ALT  30  /  AlkPhos  150<H>      Creatinine Trend: 1.31 <--, 1.56 <--, 2.18 <--, 2.91 <--, 3.01 <--                        9.7    10.11 )-----------( 504      ( 28 Sep 2022 06:34 )             30.9     Urine Studies:  Urinalysis Basic - ( 25 Sep 2022 03:17 )    Color: Yellow / Appearance: Clear / S.020 / pH:   Gluc:  / Ketone: Negative  / Bili: Small / Urobili: Negative   Blood:  / Protein: 15 / Nitrite: Negative   Leuk Esterase: Small / RBC: 5-10 /HPF / WBC 26-50 /HPF   Sq Epi:  / Non Sq Epi: Few /HPF / Bacteria: Many /HPF      Creatinine, Random Urine: 54 mg/dL ( @ 13:33)  Sodium, Random Urine: 8 mmol/L ( @ 03:17)  Creatinine, Random Urine: 111 mg/dL ( @ 03:17)  Osmolality, Random Urine: 357 mos/kg ( @ 03:17)  Potassium, Random Urine: 42 mmol/L ( @ 03:17)    < from: US Renal (22 @ 10:33) >    ACC: [id number] EXAM:  US KIDNEY(S)                          PROCEDURE DATE:  2022          INTERPRETATION:  CLINICAL INFORMATION: Follow-up bilateral hydronephrosis    COMPARISON: Abdominal CT dated 2022    TECHNIQUE: Sonography of the kidneys and bladder.    FINDINGS:  Right kidney: 9.9 cm. 1.7 cm cyst in the upper pole of the right kidney.   Mild right hydronephrosis, grossly unchanged. No evidence for a calculus   or mass in the right kidney.    Left kidney: 10.6 cm. Mild left hydronephrosis, grossly unchanged. No   evidence for a calculus, or mass.    IMPRESSION:  Mild bilateral hydronephrosis.    --- End of Report ---            CHANEL BURGOS MD; Attending Radiologist  This document has been electronically signed. Sep 28 [id number]    < end of copied text >

## 2022-09-28 NOTE — PROGRESS NOTE ADULT - SUBJECTIVE AND OBJECTIVE BOX
NP Note discussed with  primary attending    Patient is a 89y old  Female who presents with a chief complaint of ileus (27 Sep 2022 13:20)      INTERVAL HPI/OVERNIGHT EVENTS: Pt reported to feel "a little better."  Reports both knees and shoulder have pain, intermittent, 8/10.  Denies abdominal pain and "no pain b/c you don't give me nothing to eat, my stomachs empty."   Turkish -Pramod #[id number].    MEDICATIONS  (STANDING):  acetaminophen     Tablet .. 650 milliGRAM(s) Oral every 6 hours  ascorbic acid 500 milliGRAM(s) Oral daily  aspirin  chewable 81 milliGRAM(s) Oral daily  gabapentin 100 milliGRAM(s) Oral two times a day  heparin   Injectable 5000 Unit(s) SubCutaneous every 8 hours  metoprolol tartrate 25 milliGRAM(s) Oral two times a day  Nephro-dl 1 Tablet(s) Oral daily  polyethylene glycol 3350 17 Gram(s) Oral daily  senna 2 Tablet(s) Oral at bedtime  simvastatin 40 milliGRAM(s) Oral at bedtime  sodium chloride 0.9%. 1000 milliLiter(s) (75 mL/Hr) IV Continuous <Continuous>    MEDICATIONS  (PRN):  bisacodyl 5 milliGRAM(s) Oral every 12 hours PRN Constipation  HYDROmorphone   Tablet 2 milliGRAM(s) Oral every 6 hours PRN Severe Pain (7 - 10)      __________________________________________________  REVIEW OF SYSTEMS:    CONSTITUTIONAL: No fever  EYES: No acute visual disturbances  NECK: No pain or stiffness  RESPIRATORY: No cough; No shortness of breath  CARDIOVASCULAR: No chest pain, no palpitations  GASTROINTESTINAL: No pain. No nausea or vomiting.  No diarrhea   NEUROLOGICAL: No headache or numbness, no tremors  MUSCULOSKELETAL: No joint pain, no muscle pain  GENITOURINARY: No dysuria, no frequency, no hesitancy  PSYCHIATRY: No depression , no anxiety  ALL OTHER  ROS negative      Vital Signs Last 24 Hrs  T(C): 36.8 (28 Sep 2022 05:09), Max: 36.9 (27 Sep 2022 14:03)  T(F): 98.3 (28 Sep 2022 05:09), Max: 98.5 (27 Sep 2022 14:03)  HR: 91 (28 Sep 2022 05:09) (78 - 99)  BP: 135/53 (28 Sep 2022 05:09) (111/60 - 135/53)  RR: 16 (28 Sep 2022 05:09) (16 - 18)  SpO2: 99% (28 Sep 2022 05:09) (97% - 99%)    Parameters below as of 28 Sep 2022 05:09  Patient On (Oxygen Delivery Method): room air        ________________________________________________  PHYSICAL EXAM:  GENERAL: NAD. Obese   HEENT: Normocephalic;  conjunctivae and sclerae clear; moist mucous membranes;   NECK : Supple  CHEST/LUNG: Clear to auscultation bilaterally with good air entry   HEART: S1 S2  regular; no murmurs, gallops or rubs  ABDOMEN: Soft, Nontender, Nondistended; More active bowel sounds present x 4 quad.  No rebound or guarding noted.     EXTREMITIES: No cyanosis; No edema; no calf tenderness.  Right hip staples from 9/15 repair   SKIN: Warm and dry; no rash  NERVOUS SYSTEM:  Awake and alert; Oriented  to place, person and time ; no new deficits  _________________________________________________  LABS:                        9.7    10.11 )-----------( 504      ( 28 Sep 2022 06:34 )             30.9     09-28    140  |  109<H>  |  73<H>  ----------------------------<  125<H>  4.1   |  22  |  1.31<H>    Ca    8.5      28 Sep 2022 06:34  Phos  4.0     09-28  Mg     2.4     09-28    TPro  5.6<L>  /  Alb  1.9<L>  /  TBili  0.5  /  DBili  x   /  AST  37  /  ALT  30  /  AlkPhos  150<H>  09-28        CAPILLARY BLOOD GLUCOSE            RADIOLOGY & ADDITIONAL TESTS:    Imaging Personally Reviewed:  YES    Consultant(s) Notes Reviewed:   YES    Care Discussed with Consultants :     Plan of care was discussed with patient and /or primary care giver; all questions and concerns were addressed and care was aligned with patient's wishes.

## 2022-09-28 NOTE — PROGRESS NOTE ADULT - PROBLEM SELECTOR PLAN 2
- Most likely pre-renal d/t decreased PO intake   - S/p Renal US mild hydronephrosis   - 9/27 Guardado dc'd and passed TOV.  Pt voiding.    - C/w IVF   - Continue to avoid nephrotoxic medications  - Continue to monitor CMP in AM-f/u results  - Nephro-Dr. Moreland/Dr. Tucker consulted, appreciated

## 2022-09-28 NOTE — PROGRESS NOTE ADULT - PROBLEM SELECTOR PLAN 1
- P/w chronic constipation and ileus  - Possibly in the setting of post-op anesthesia vs. opioid induced constipation  - Abd XR (9/23) as outpatient showed generalized ileus  - S/p CT A/P  - 9/26 Diet advanced to clear and tolerated.  9/27 Diet advanced to Full liquid.  9/28 Diet advanced to soft and tolerating   - S/p enema   - Continue to monitor for BM  - Continue to avoid opioids for pain regimen

## 2022-09-29 LAB
ALBUMIN SERPL ELPH-MCNC: 1.7 G/DL — LOW (ref 3.5–5)
ALP SERPL-CCNC: 132 U/L — HIGH (ref 40–120)
ALT FLD-CCNC: 26 U/L DA — SIGNIFICANT CHANGE UP (ref 10–60)
ANION GAP SERPL CALC-SCNC: 8 MMOL/L — SIGNIFICANT CHANGE UP (ref 5–17)
AST SERPL-CCNC: 24 U/L — SIGNIFICANT CHANGE UP (ref 10–40)
BILIRUB SERPL-MCNC: 0.5 MG/DL — SIGNIFICANT CHANGE UP (ref 0.2–1.2)
BUN SERPL-MCNC: 68 MG/DL — HIGH (ref 7–18)
CALCIUM SERPL-MCNC: 8.1 MG/DL — LOW (ref 8.4–10.5)
CHLORIDE SERPL-SCNC: 110 MMOL/L — HIGH (ref 96–108)
CO2 SERPL-SCNC: 23 MMOL/L — SIGNIFICANT CHANGE UP (ref 22–31)
CREAT SERPL-MCNC: 1.3 MG/DL — SIGNIFICANT CHANGE UP (ref 0.5–1.3)
EGFR: 39 ML/MIN/1.73M2 — LOW
GLUCOSE SERPL-MCNC: 116 MG/DL — HIGH (ref 70–99)
HCT VFR BLD CALC: 28.2 % — LOW (ref 34.5–45)
HGB BLD-MCNC: 8.7 G/DL — LOW (ref 11.5–15.5)
MAGNESIUM SERPL-MCNC: 2.1 MG/DL — SIGNIFICANT CHANGE UP (ref 1.6–2.6)
MCHC RBC-ENTMCNC: 30.9 GM/DL — LOW (ref 32–36)
MCHC RBC-ENTMCNC: 31 PG — SIGNIFICANT CHANGE UP (ref 27–34)
MCV RBC AUTO: 100.4 FL — HIGH (ref 80–100)
NRBC # BLD: 0 /100 WBCS — SIGNIFICANT CHANGE UP (ref 0–0)
PHOSPHATE SERPL-MCNC: 3.9 MG/DL — SIGNIFICANT CHANGE UP (ref 2.5–4.5)
PLATELET # BLD AUTO: 447 K/UL — HIGH (ref 150–400)
POTASSIUM SERPL-MCNC: 4.3 MMOL/L — SIGNIFICANT CHANGE UP (ref 3.5–5.3)
POTASSIUM SERPL-SCNC: 4.3 MMOL/L — SIGNIFICANT CHANGE UP (ref 3.5–5.3)
PROT SERPL-MCNC: 5.6 G/DL — LOW (ref 6–8.3)
RBC # BLD: 2.81 M/UL — LOW (ref 3.8–5.2)
RBC # FLD: 14.9 % — HIGH (ref 10.3–14.5)
SARS-COV-2 RNA SPEC QL NAA+PROBE: DETECTED
SODIUM SERPL-SCNC: 141 MMOL/L — SIGNIFICANT CHANGE UP (ref 135–145)
WBC # BLD: 8.47 K/UL — SIGNIFICANT CHANGE UP (ref 3.8–10.5)
WBC # FLD AUTO: 8.47 K/UL — SIGNIFICANT CHANGE UP (ref 3.8–10.5)

## 2022-09-29 PROCEDURE — 99232 SBSQ HOSP IP/OBS MODERATE 35: CPT

## 2022-09-29 RX ORDER — INFLUENZA VIRUS VACCINE 15; 15; 15; 15 UG/.5ML; UG/.5ML; UG/.5ML; UG/.5ML
0.7 SUSPENSION INTRAMUSCULAR ONCE
Refills: 0 | Status: DISCONTINUED | OUTPATIENT
Start: 2022-09-29 | End: 2022-10-01

## 2022-09-29 RX ADMIN — Medication 1 TABLET(S): at 13:06

## 2022-09-29 RX ADMIN — Medication 650 MILLIGRAM(S): at 15:07

## 2022-09-29 RX ADMIN — HYDROMORPHONE HYDROCHLORIDE 2 MILLIGRAM(S): 2 INJECTION INTRAMUSCULAR; INTRAVENOUS; SUBCUTANEOUS at 10:22

## 2022-09-29 RX ADMIN — HEPARIN SODIUM 5000 UNIT(S): 5000 INJECTION INTRAVENOUS; SUBCUTANEOUS at 06:07

## 2022-09-29 RX ADMIN — Medication 650 MILLIGRAM(S): at 23:02

## 2022-09-29 RX ADMIN — Medication 650 MILLIGRAM(S): at 00:04

## 2022-09-29 RX ADMIN — Medication 650 MILLIGRAM(S): at 06:36

## 2022-09-29 RX ADMIN — HYDROMORPHONE HYDROCHLORIDE 2 MILLIGRAM(S): 2 INJECTION INTRAMUSCULAR; INTRAVENOUS; SUBCUTANEOUS at 17:10

## 2022-09-29 RX ADMIN — Medication 650 MILLIGRAM(S): at 18:19

## 2022-09-29 RX ADMIN — HEPARIN SODIUM 5000 UNIT(S): 5000 INJECTION INTRAVENOUS; SUBCUTANEOUS at 22:49

## 2022-09-29 RX ADMIN — Medication 650 MILLIGRAM(S): at 13:02

## 2022-09-29 RX ADMIN — HYDROMORPHONE HYDROCHLORIDE 2 MILLIGRAM(S): 2 INJECTION INTRAMUSCULAR; INTRAVENOUS; SUBCUTANEOUS at 11:00

## 2022-09-29 RX ADMIN — HEPARIN SODIUM 5000 UNIT(S): 5000 INJECTION INTRAVENOUS; SUBCUTANEOUS at 13:19

## 2022-09-29 RX ADMIN — GABAPENTIN 100 MILLIGRAM(S): 400 CAPSULE ORAL at 18:19

## 2022-09-29 RX ADMIN — Medication 5 MILLIGRAM(S): at 13:03

## 2022-09-29 RX ADMIN — Medication 25 MILLIGRAM(S): at 18:19

## 2022-09-29 RX ADMIN — Medication 500 MILLIGRAM(S): at 13:03

## 2022-09-29 RX ADMIN — Medication 25 MILLIGRAM(S): at 06:07

## 2022-09-29 RX ADMIN — SIMVASTATIN 40 MILLIGRAM(S): 20 TABLET, FILM COATED ORAL at 22:50

## 2022-09-29 RX ADMIN — SODIUM CHLORIDE 75 MILLILITER(S): 9 INJECTION INTRAMUSCULAR; INTRAVENOUS; SUBCUTANEOUS at 02:50

## 2022-09-29 RX ADMIN — Medication 650 MILLIGRAM(S): at 06:06

## 2022-09-29 RX ADMIN — GABAPENTIN 100 MILLIGRAM(S): 400 CAPSULE ORAL at 06:07

## 2022-09-29 RX ADMIN — SENNA PLUS 2 TABLET(S): 8.6 TABLET ORAL at 22:49

## 2022-09-29 RX ADMIN — Medication 81 MILLIGRAM(S): at 13:03

## 2022-09-29 RX ADMIN — HYDROMORPHONE HYDROCHLORIDE 2 MILLIGRAM(S): 2 INJECTION INTRAMUSCULAR; INTRAVENOUS; SUBCUTANEOUS at 16:46

## 2022-09-29 RX ADMIN — Medication 650 MILLIGRAM(S): at 19:07

## 2022-09-29 NOTE — PROGRESS NOTE ADULT - PROBLEM SELECTOR PLAN 8
- Hx of R hip fracture (s/p R hip surgery in 9/15/22)  - Was on Eliquis for DVT prophylaxis.  Change to Heparin in setting of RAMSEY  - C/w supportive care  - C/w pain meds as needed but avoid opioids due to ileus  -  As discussed w/ Attending.  Pt to have staples removed 14d post sx.  Therefore, staples to be removed 9/29. H/o HLD, home med Simvastatin  - C/w Simvastatin

## 2022-09-29 NOTE — PROGRESS NOTE ADULT - NS ATTEND AMEND GEN_ALL_CORE FT
89F PMH osteoporosis, HTN, recent hip fxr 4d prior to presentation, obstructive uropathy, p/w ileus/constipation and RAMSEY. RAMSEY improving with valle and IVF. Constipation improving with enemas.    Interval: complaining of RLE pain still, likely from fxr, starting solid food today    #Ileus  #constipation  #RAMSEY, improving  #urinary retention, resolved  #B/L LE edema  #hypoalbumin  #transaminitis  #anemia, stable  #hip fxr s/p surgery 9/15/22  #HTN  #HLD    -advance to full diet today, c/w bowel regimen, add zofran prn nausea  -FeNa appears pre-RAMSEY, improving on fluids, c/w IVF  -c/w valle  -B/L LE edema without obvious venous stasis skin changes, 2/2 ramsey vs hyoalbumin- give compression stockings, c/w elevation  -f/u dietician recs: add nepro tid + vit C  -increase tylenol prn to standing and add po dilaudid prn pain (B/L LE)  -rest of care as above
89F PMH osteoporosis, HTN, recent hip fxr 4d prior to presentation, obstructive uropathy, p/w ileus/constipation and RAMSEY. RAMSEY improving with valle and IVF. Constipation improving with enemas.    #Ileus  #constipation  #RAMSEY, improving  #urinary retention, resolved  #B/L LE edema  #hypoalbumin  #transaminitis  #anemia, stable  #hip fxr s/p surgery 9/15/22  #HTN  #HLD    -advance diet as tolerated, c/w bowel regimen  -FeNa appears pre-RAMSEY, improving on fluids, c/w IVF  -c/w valle  -B/L LE edema without obvious venous stasis skin changes, 2/2 ramsey vs hyoalbumin- give compression stockings, c/w elevation  -f/u dietician recs: add nepro tid + vit C  -tylenol prn pain (B/L LE)  -rest of care as above
Patient reports being hungry wants to eat. Having bowel movements. Abdominal distension improved. Start on CLD. Advance diet as tolerated. Continue to hold fluids. Cr improving, continue with IVF hydration. If tolerating PO intake can discontinue. Continue on ceftriaxone for possible UTI, but if UC negative then can discontinue. Discharge when tolerating diet and having bowel movements. Consider TOV tomorrow if abdominal distension improved.
89F PMH osteoporosis, HTN, recent hip fxr 4d prior to presentation, obstructive uropathy, p/w ileus/constipation and RAMSEY. RAMSEY improving with valle and IVF. Constipation improving with enemas.    Interval: RLE pain slightly improved, tolerating food po    #Ileus  #constipation  #RAMSEY, improving  #urinary retention, resolved  #B/L LE edema  #hypoalbumin  #transaminitis  #anemia, stable  #hip fxr s/p surgery 9/15/22  #HTN  #HLD  #COVID    -c/w full diet; c/w bowel regimen, zofran prn nausea  -pre-RAMSEY improving on fluids, c/w IVF  -B/L LE edema without obvious venous stasis skin changes, 2/2 ramsey vs hyoalbumin- give compression stockings, c/w elevation  -f/u dietician recs: add nepro tid + vit C  -c/w tylenol standing and po dilaudid prn pain (B/L LE)  -pending placement to [facility]  -rest of care as above

## 2022-09-29 NOTE — PROGRESS NOTE ADULT - PROBLEM SELECTOR PLAN 4
- UA with pyuria.  Started Ceftriaxone   - Ucx negative and 9/26 dc'd Ceftriaxone - P/w bilateral leg edema and pain  - Could be in the setting of post-obstructive RAMSEY vs. hypoalbuminemia  - LE US negative for DVT

## 2022-09-29 NOTE — PROGRESS NOTE ADULT - PROBLEM SELECTOR PLAN 5
- P/w abd distention and elevated LFT  - Improving   - AST - downtrending   - ALP - downtrending   - ALT normalized   - CT A/P normal liver  - Could be in the setting of fluid overload  - Continue to monitor CMP in AM-f/u results - Normalized   - Continue to monitor for signs and sxms of infection  - CBC in AM-f/u results

## 2022-09-29 NOTE — CONSULT NOTE ADULT - SUBJECTIVE AND OBJECTIVE BOX
KALPANA DAVIDSON  [id number]    Orthopedic Consult: Staple removal s/p right hip IM nail on 9/15/2022    Orthopedic Diagnosis: s/p right hip IM nail on 9/15/2022      KALPANA OSBORNEyFemale  HPI:   ID#[id number]  Patient is a 89 y/o F, Tajik-speaking from [facility] with a PMHx of HTN, osteoporosis, HLD, and a PSHx of  R hip intramedullary nailing on 9/15/22, , +COVID from 7/20. Patient was discharged from [facility] post-operatively on 9/20/2022 and was sent back to [facility] on 9/24/2022 where she is now admitted for b/l leg swelling and an ileus. Pt denies Chest pain, SOB, dyspnea, paresthesias, syncope, or pain anywhere else.       PAST MEDICAL & SURGICAL HISTORY:  Hyperlipidemia  Arthritis  Anemia  Neuropathy  Hypertension  History of Cholecystectomy  Gall Bladder Disease  Cholecystectomy  1994  Hip fracture  s/p surgery in 9/15/22          FAMILY HISTORY:  No pertinent family history in first degree relatives        Social History:  non-smoker  non-alcoholic beverage drinker  no illicit drug use (24 Sep 2022 17:23)      Allergies    No Known Allergies    Intolerances        Home Medications:  acetaminophen 500 mg oral tablet: 1 tab(s) orally every 8 hours, As Needed for mild to moderate pain  (20 Sep 2022 09:52)  alendronate 70 mg oral tablet: 1 tab(s) orally once a week (13 Sep 2022 17:20)  aspirin 81 mg oral tablet, chewable: 1 tab(s) orally once a day (13 Sep 2022 17:20)  enoxaparin: 30 milligram(s) subcutaneous once a day  LAST DAY 9/30/2022 (20 Sep 2022 15:10)  ferrous sulfate 325 mg oral delayed release tablet: 1 tab(s) orally once a day (13 Sep 2022 17:20)  gabapentin 100 mg oral capsule: 1 tab(s) orally 2 times a day (13 Sep 2022 17:20)  metoprolol tartrate 25 mg oral tablet: 1 tab(s) orally 2 times a day (20 Sep 2022 09:52)  oxyCODONE 5 mg oral tablet: 1 tab(s) orally every 6 hours, As Needed for severe pain (20 Sep 2022 16:38)  polyethylene glycol 3350 oral powder for reconstitution: 17 gram(s) orally 2 times a day (20 Sep 2022 09:52)  senna leaf extract oral tablet: 2 tab(s) orally once a day (at bedtime) (20 Sep 2022 09:52)  simvastatin 40 mg oral tablet: 1 tab(s) orally once a day (at bedtime) (20 Sep 2022 09:52)  Vitamin B-100 oral tablet: 1 tab(s) orally once a day (13 Sep 2022 17:20)      Vital Signs Last 24 Hrs  T(C): 36.6 (29 Sep 2022 13:52), Max: 37.1 (29 Sep 2022 05:55)  T(F): 97.9 (29 Sep 2022 13:52), Max: 98.8 (29 Sep 2022 05:55)  HR: 70 (29 Sep 2022 14:13) (70 - 94)  BP: 124/52 (29 Sep 2022 14:13) (119/61 - 151/70)  BP(mean): --  RR: 17 (29 Sep 2022 13:52) (17 - 17)  SpO2: 100% (29 Sep 2022 14:13) (97% - 100%)    Parameters below as of 29 Sep 2022 14:13  Patient On (Oxygen Delivery Method): room air      I&O's Summary    28 Sep 2022 07:01  -  29 Sep 2022 07:00  --------------------------------------------------------  IN: 0 mL / OUT: 575 mL / NET: -575 mL        Physical Exam:  General: Alert and oriented, NAD, resting comfortably  Musculoskeletal:  Right hip: Skin warm and pink. Swelling noted throughout the right leg. Staples intact. incisions well healed. No active drainage. Leg in Nl. alignement.   Lower extremities: Calves soft and NTTP b/l. SILT. NVI. (+)EHL/FHL/ADF/APF intact bilaterally    Labs:                        8.7    8.47  )-----------( 447      ( 29 Sep 2022 07:01 )             28.2     09-29    141  |  110<H>  |  68<H>  ----------------------------<  116<H>  4.3   |  23  |  1.30    Ca    8.1<L>      29 Sep 2022 07:01  Phos  3.9     09-29  Mg     2.1     09-29    TPro  5.6<L>  /  Alb  1.7<L>  /  TBili  0.5  /  DBili  x   /  AST  24  /  ALT  26  /  AlkPhos  132<H>  09-29        Radiology:    Impression: Patient is a 89yFemale with s/p right hip IM Nail on 9/15/2022  Plan:  - Pain control  - DVT prophylaxis  - Daily PT - WBAT to RLE with appropriate assistive device  - Staples removed at bedside, steri-strips applied  - Xrays of Right femur and right hip ordered   - Case d/w Dr. Encinas

## 2022-09-29 NOTE — ADVANCED PRACTICE NURSE CONSULT - RECOMMEDATIONS
-Clean all wounds with normal saline and apply skin prep to the surrounding skin  -Leave the Bilateral Heel wounds open to air  -Apply Calcium Alginate (Aquacel) to the L. Gluteal wound and cover with a Foam dressing Q 72hrs PRN  -Elevate/float the patients heels using heel  protectors and reposition the patient Q 2hrs using wedges or pillows

## 2022-09-29 NOTE — PROGRESS NOTE ADULT - ASSESSMENT
89 year old Female with PMH of HTN, HLD, s/p R hip surgery (Intramedullary nailing of right hip done 9/15/22), osteoporosis, COVID (+) from 7/20.   Admitted for Ileus.  Right hip staples to be removed by ortho 9/29. 89 year old Female with PMH of HTN, HLD, s/p R hip surgery (Intramedullary nailing of right hip done 9/15/22), osteoporosis, COVID (+) from 7/20.   Admitted for Ileus.  Ortho called for Right hip staples  removal  9/29.

## 2022-09-29 NOTE — PROGRESS NOTE ADULT - PROBLEM SELECTOR PLAN 3
- P/w dysuria and urinary retention.  SCr=3.01  - CT Abd/Pelvis showed moderately distended bladder, mild bilateral hydronephrosis  - Continue to monitor urine output   - Continue to monitor CMP in AM-f/u results - P/w abd distention and elevated LFT- Improving   - AST - downtrending   - ALP - downtrending   - ALT normalized   - CT A/P normal liver  - Could be in the setting of fluid overload  - Continue to monitor CMP in AM-f/u results

## 2022-09-29 NOTE — PROGRESS NOTE ADULT - ASSESSMENT
89yo F PMHx of HTN, HLD, osteoporosis, recent R hip repair / resolving RAMSEY presenting with ileus and recurrent RAMSEY. Nephrology consulted for Elevated serum creatinine.      1. RAMSEY- unknown baseline SCr; sadie SCr 1.2-1.3. RAMSEY likely partially due to obstruction given urinary retention with hydronephrosis/ pre-renal component. Renal function now improving with IVF; Can monitor off IVF; please encourage PO fluid intake. UA active in the setting of UTI; Finished Ceftriaxone. FENa+ 0.17%- consistent wit pre-renal RAMSEY. Strict I/Os. Avoid nephrotoxins/ NSAIDs/ RCA. Monitor BMP.  2. Hydronephrosis 2/2 urinary retention- continue valle for now.   Abx per primary team. Renal US with persistent b/l mild hydro.   3. Hyponatremia- hypovolemic- resolved with NS IVF. Monitor serum Na.   4. LE edema due in part to DVTs, in part due to hypoalbuminemia in the setting of PCM with decreased po intake and also illness with covid-19.  AC per primary team.  c/w Nepro protein supplements.   Spot urine protein/creatinine ratio 0.7; non nephrotic range. NO DIURETICS as pt is intravascularly volume deplete.          [facility] NEPHROLOGY  Sai Moreland M.D.  Matt Mcguire D.O.  Lo Tucker M.D.  Charlette Hobbs, MSN, ANP-C    Telephone: [phone]  Facsimile: [phone]    [address] [place] [address]

## 2022-09-29 NOTE — PROGRESS NOTE ADULT - PROBLEM SELECTOR PLAN 10
H/o HLD, home med Simvastatin  - C/w Simvastatin - Pt has right hip staples from 9/15 IM nailing to be removed 9/29.  Will most likely continue post sx DVT ppx on dc as pt's minimally ambulatory at risk for DVT.  Please continue dc DVT ppx.    - Pt will rec'v daily PT as discussed w/ PT on 9/28

## 2022-09-29 NOTE — PROGRESS NOTE ADULT - PROBLEM SELECTOR PLAN 11
- C/w Heparin for DVT ppx  - C/w Protonix for GI ppx

## 2022-09-29 NOTE — PROGRESS NOTE ADULT - SUBJECTIVE AND OBJECTIVE BOX
[facility] NEPHROLOGY- PROGRESS NOTE    89yo F PMHx of HTN, HLD, osteoporosis, recent R hip repair / resolving RAMSEY presenting with ileus and recurrent RAMSEY. Nephrology consulted for Elevated serum creatinine.  +COVID 19; now off isolation    Hospital Medications: Medications reviewed.  REVIEW OF SYSTEMS:  CONSTITUTIONAL: No fevers or chills  RESPIRATORY: No shortness of breath  CARDIOVASCULAR: No chest pain.  GASTROINTESTINAL: No nausea, vomiting, diarrhea or abdominal pain.   VASCULAR: No bilateral lower extremity edema.  +Rt hip pain    VITALS:  T(F): 97.9 (22 @ 13:52), Max: 98.8 (22 @ 05:55)  HR: 70 (22 @ 14:13)  BP: 124/52 (22 @ 14:13)  RR: 17 (22 @ 13:52)  SpO2: 100% (22 @ 14:13)  Wt(kg): --     @ 07:01  -   @ 07:00  --------------------------------------------------------  IN: 0 mL / OUT: 575 mL / NET: -575 mL      PHYSICAL EXAM:  Constitutional: NAD  Neurological: Awake and Alert  HEENT: anicteric sclera,   Respiratory: CTAB, no wheezes, rales or rhonchi  Cardiovascular: S1, S2, RRR  Gastrointestinal: BS+, soft, NT/ND  : +valle.  Extremities: No peripheral edema    LABS:      141  |  110<H>  |  68<H>  ----------------------------<  116<H>  4.3   |  23  |  1.30    Ca    8.1<L>      29 Sep 2022 07:01  Phos  3.9       Mg     2.1         TPro  5.6<L>  /  Alb  1.7<L>  /  TBili  0.5  /  DBili      /  AST  24  /  ALT  26  /  AlkPhos  132<H>      Creatinine Trend: 1.30 <--, 1.31 <--, 1.56 <--, 2.18 <--, 2.91 <--, 3.01 <--                        8.7    8.47  )-----------( 447      ( 29 Sep 2022 07:01 )             28.2     Urine Studies:  Urinalysis Basic - ( 25 Sep 2022 03:17 )    Color: Yellow / Appearance: Clear / S.020 / pH:   Gluc:  / Ketone: Negative  / Bili: Small / Urobili: Negative   Blood:  / Protein: 15 / Nitrite: Negative   Leuk Esterase: Small / RBC: 5-10 /HPF / WBC 26-50 /HPF   Sq Epi:  / Non Sq Epi: Few /HPF / Bacteria: Many /HPF      Creatinine, Random Urine: 54 mg/dL ( @ 13:33)  Sodium, Random Urine: 8 mmol/L ( @ 03:17)  Creatinine, Random Urine: 111 mg/dL ( @ 03:17)  Osmolality, Random Urine: 357 mos/kg ( @ 03:17)  Potassium, Random Urine: 42 mmol/L ( @ 03:17)

## 2022-09-29 NOTE — PROGRESS NOTE ADULT - PROBLEM SELECTOR PLAN 7
- Normalized   - Continue to monitor for signs and sxms of infection  - CBC in AM-f/u results H/o of HTN, home med Metoprolol tartrate  - C/w Metoprolol  - Continue to monitor BP

## 2022-09-29 NOTE — PROGRESS NOTE ADULT - PROBLEM SELECTOR PROBLEM 2
RAMSEY (acute kidney injury)

## 2022-09-29 NOTE — PROGRESS NOTE ADULT - PROBLEM SELECTOR PLAN 12
- Pt has right him staples from 9/15 IM nailing to be removed 9/29.  Will most likely continue post sx DVT ppx on dc as pt's minimally ambulatory at risk for DVT.  Please continue dc DVT ppx.    - Pt will rec'v daily PT as discussed w/ PT on 9/28
- Pt has right him staples from 9/15 IM nailing to be removed 9/29.  Will most likely continue post sx DVT ppx on dc as pt's minimally ambulatory at risk for DVT.  Please continue dc DVT ppx.    - Pt will rec'v daily PT as discussed w/ PT on 9/28

## 2022-09-29 NOTE — PROGRESS NOTE ADULT - SUBJECTIVE AND OBJECTIVE BOX
NP Note discussed with  Primary Attending    Patient is a 89y old  Female who presents with a chief complaint of ileus (28 Sep 2022 16:24)      INTERVAL HPI/OVERNIGHT EVENTS: no new complaints    MEDICATIONS  (STANDING):  acetaminophen     Tablet .. 650 milliGRAM(s) Oral every 6 hours  ascorbic acid 500 milliGRAM(s) Oral daily  aspirin  chewable 81 milliGRAM(s) Oral daily  gabapentin 100 milliGRAM(s) Oral two times a day  heparin   Injectable 5000 Unit(s) SubCutaneous every 8 hours  influenza  Vaccine (HIGH DOSE) 0.7 milliLiter(s) IntraMuscular once  metoprolol tartrate 25 milliGRAM(s) Oral two times a day  Nephro-dl 1 Tablet(s) Oral daily  polyethylene glycol 3350 17 Gram(s) Oral daily  senna 2 Tablet(s) Oral at bedtime  simvastatin 40 milliGRAM(s) Oral at bedtime  sodium chloride 0.9%. 1000 milliLiter(s) (75 mL/Hr) IV Continuous <Continuous>    MEDICATIONS  (PRN):  bisacodyl 5 milliGRAM(s) Oral every 12 hours PRN Constipation  HYDROmorphone   Tablet 2 milliGRAM(s) Oral every 6 hours PRN Severe Pain (7 - 10)      __________________________________________________  REVIEW OF SYSTEMS:    CONSTITUTIONAL: No fever,   EYES: no acute visual disturbances  NECK: No pain or stiffness  RESPIRATORY: No cough; No shortness of breath  CARDIOVASCULAR: No chest pain, no palpitations  GASTROINTESTINAL: No pain. No nausea or vomiting; No diarrhea   NEUROLOGICAL: No headache or numbness, no tremors  MUSCULOSKELETAL: No joint pain, no muscle pain  GENITOURINARY: no dysuria, no frequency, no hesitancy  PSYCHIATRY: no depression , no anxiety  ALL OTHER  ROS negative        Vital Signs Last 24 Hrs  T(C): 37.1 (29 Sep 2022 05:55), Max: 37.1 (29 Sep 2022 05:55)  T(F): 98.8 (29 Sep 2022 05:55), Max: 98.8 (29 Sep 2022 05:55)  HR: 81 (29 Sep 2022 05:55) (74 - 81)  BP: 129/76 (29 Sep 2022 05:55) (119/59 - 151/70)  BP(mean): --  RR: 17 (29 Sep 2022 05:55) (16 - 17)  SpO2: 99% (29 Sep 2022 05:55) (97% - 100%)    Parameters below as of 29 Sep 2022 05:55  Patient On (Oxygen Delivery Method): room air        ________________________________________________  PHYSICAL EXAM:  well developed  GENERAL: NAD  HEENT: Normocephalic;  conjunctivae and sclerae clear; moist mucous membranes;   NECK : supple  CHEST/LUNG: Clear to auscultation bilaterally with good air entry   HEART: S1 S2  regular; no murmurs, gallops or rubs  ABDOMEN: Soft, Nontender, Nondistended; Bowel sounds present  EXTREMITIES: Right hip drsg C/D/I, no cyanosis; no edema; no calf tenderness  SKIN: warm and dry; no rash  NERVOUS SYSTEM:  Awake and alert; Oriented  to place, person and time ; no new deficits    _________________________________________________  LABS:                        8.7    8.47  )-----------( 447      ( 29 Sep 2022 07:01 )             28.2     09-29    141  |  110<H>  |  68<H>  ----------------------------<  116<H>  4.3   |  23  |  1.30    Ca    8.1<L>      29 Sep 2022 07:01  Phos  3.9     09-29  Mg     2.1     09-29    TPro  5.6<L>  /  Alb  1.7<L>  /  TBili  0.5  /  DBili  x   /  AST  24  /  ALT  26  /  AlkPhos  132<H>  09-29      CAPILLARY BLOOD GLUCOSE      POCT Blood Glucose.: 124 mg/dL (28 Sep 2022 17:04)    RADIOLOGY & ADDITIONAL TESTS:  < from: US Renal (09.28.22 @ 10:33) >    ACC: [id number] EXAM:  US KIDNEY(S)                          PROCEDURE DATE:  09/28/2022          INTERPRETATION:  CLINICAL INFORMATION: Follow-up bilateral hydronephrosis    COMPARISON: Abdominal CT dated 9/24/2022    TECHNIQUE: Sonography of the kidneys and bladder.    FINDINGS:  Right kidney: 9.9 cm. 1.7 cm cyst in the upper pole of the right kidney.   Mild right hydronephrosis, grossly unchanged. No evidence for a calculus   or mass in the right kidney.    Left kidney: 10.6 cm. Mild left hydronephrosis, grossly unchanged. No   evidence for a calculus, or mass.    IMPRESSION:  Mild bilateral hydronephrosis.    --- End of Report ---    < end of copied text >      < from: CT Abdomen and Pelvis No Cont (09.24.22 @ 17:45) >    ACC: [id number] EXAM:  CT ABDOMEN AND PELVIS                          PROCEDURE DATE:  09/24/2022          INTERPRETATION:  CLINICAL INFORMATION: Abdominal distention, fluid   retention, worsening renal function. Recent admission for right hip   fracture.    COMPARISON: CT abdomen pelvis 7/18/2015.    CONTRAST/COMPLICATIONS:  IV Contrast: NONE  Oral Contrast: NONE  Complications: None reported at time of study completion    PROCEDURE:  CT of the Abdomen and Pelvis was performed.  Sagittal and coronal reformats were performed.    FINDINGS:  Evaluation of the solid organs and vasculature is limited without   intravenous contrast.    LOWER CHEST: Small bilateral pleural effusions.    LIVER: Within normal limits.  BILE DUCTS: Normal caliber.  GALLBLADDER: Not visualized, may be surgically absent.  SPLEEN: Within normal limits.  PANCREAS: Within normal limits.  ADRENALS: Within normal limits.  KIDNEYS/URETERS: Mild bilateral hydroureteronephrosis.    BLADDER: Moderately distended with small focus of air.  REPRODUCTIVE ORGANS: Guardado catheter balloon inflated in the vagina.    BOWEL: Small hiatal hernia. Mild air-fluid distention of the small bowel   without discrete transition point. Ascending and transverse colon   distended with air and fluid. Moderate amount of formed stool scattered   throughout the distal transverse colon to the rectum. Findings are   suggestive of ileus. Appendix is normal.  PERITONEUM: No ascites.  VESSELS: Atherosclerotic changes.  RETROPERITONEUM/LYMPH NODES: No lymphadenopathy.  ABDOMINAL WALL: Subcutaneous edema. Small foci of subcutaneous air in the   ventral abdominal wall, likely related to subcutaneous injection.  BONES: Degenerative changes. Old left superior and inferior pubic bone   fractures. Status post ORIF of a comminuted right intertrochanteric   femoral fracture.    IMPRESSION:  Guarddao catheter balloon inflated in the vagina.    Moderately distended bladder with mild bilateral hydroureteronephrosis.    Small bowel and large bowel dilatation without discrete transition point,   suggestive of ileus. Moderate stool scattered throughout the distal colon.    --- End of Report ---    < end of copied text >      Imaging Personally Reviewed:  YES/NO    Consultant(s) Notes Reviewed:   YES/ No    Care Discussed with Consultants :     Plan of care was discussed with patient and /or primary care giver; all questions and concerns were addressed and care was aligned with patient's wishes.     NP Note discussed with  Primary Attending    Patient is a 89y old  Female who presents with a chief complaint of ileus (28 Sep 2022 16:24)      INTERVAL HPI/OVERNIGHT EVENTS: no new complaints    MEDICATIONS  (STANDING):  acetaminophen     Tablet .. 650 milliGRAM(s) Oral every 6 hours  ascorbic acid 500 milliGRAM(s) Oral daily  aspirin  chewable 81 milliGRAM(s) Oral daily  gabapentin 100 milliGRAM(s) Oral two times a day  heparin   Injectable 5000 Unit(s) SubCutaneous every 8 hours  influenza  Vaccine (HIGH DOSE) 0.7 milliLiter(s) IntraMuscular once  metoprolol tartrate 25 milliGRAM(s) Oral two times a day  Nephro-dl 1 Tablet(s) Oral daily  polyethylene glycol 3350 17 Gram(s) Oral daily  senna 2 Tablet(s) Oral at bedtime  simvastatin 40 milliGRAM(s) Oral at bedtime  sodium chloride 0.9%. 1000 milliLiter(s) (75 mL/Hr) IV Continuous <Continuous>    MEDICATIONS  (PRN):  bisacodyl 5 milliGRAM(s) Oral every 12 hours PRN Constipation  HYDROmorphone   Tablet 2 milliGRAM(s) Oral every 6 hours PRN Severe Pain (7 - 10)      __________________________________________________  REVIEW OF SYSTEMS:    CONSTITUTIONAL: No fever,   EYES: no acute visual disturbances  NECK: No pain or stiffness  RESPIRATORY: No cough; No shortness of breath  CARDIOVASCULAR: No chest pain, no palpitations  GASTROINTESTINAL: No pain. No nausea or vomiting; No diarrhea   NEUROLOGICAL: No headache or numbness, no tremors  MUSCULOSKELETAL: No joint pain, no muscle pain  GENITOURINARY: no dysuria, no frequency, no hesitancy  PSYCHIATRY: no depression , no anxiety  ALL OTHER  ROS negative        Vital Signs Last 24 Hrs  T(C): 37.1 (29 Sep 2022 05:55), Max: 37.1 (29 Sep 2022 05:55)  T(F): 98.8 (29 Sep 2022 05:55), Max: 98.8 (29 Sep 2022 05:55)  HR: 81 (29 Sep 2022 05:55) (74 - 81)  BP: 129/76 (29 Sep 2022 05:55) (119/59 - 151/70)  BP(mean): --  RR: 17 (29 Sep 2022 05:55) (16 - 17)  SpO2: 99% (29 Sep 2022 05:55) (97% - 100%)    Parameters below as of 29 Sep 2022 05:55  Patient On (Oxygen Delivery Method): room air        ________________________________________________  PHYSICAL EXAM:  well developed  GENERAL: NAD  HEENT: Normocephalic;  conjunctivae and sclerae clear; moist mucous membranes;   NECK : supple  CHEST/LUNG: Clear to auscultation bilaterally with good air entry   HEART: S1 S2  regular; no murmurs, gallops or rubs  ABDOMEN: Soft, Nontender, Nondistended; Bowel sounds present  EXTREMITIES: Right hip staples intact, Right hip drsg C/D/I, no cyanosis; no edema; no calf tenderness  SKIN: warm and dry; no rash  NERVOUS SYSTEM:  Awake and alert; Oriented  to place, person and time ; no new deficits    _________________________________________________  LABS:                        8.7    8.47  )-----------( 447      ( 29 Sep 2022 07:01 )             28.2     09-29    141  |  110<H>  |  68<H>  ----------------------------<  116<H>  4.3   |  23  |  1.30    Ca    8.1<L>      29 Sep 2022 07:01  Phos  3.9     09-29  Mg     2.1     09-29    TPro  5.6<L>  /  Alb  1.7<L>  /  TBili  0.5  /  DBili  x   /  AST  24  /  ALT  26  /  AlkPhos  132<H>  09-29      CAPILLARY BLOOD GLUCOSE      POCT Blood Glucose.: 124 mg/dL (28 Sep 2022 17:04)    RADIOLOGY & ADDITIONAL TESTS:  < from: US Renal (09.28.22 @ 10:33) >    ACC: [id number] EXAM:  US KIDNEY(S)                          PROCEDURE DATE:  09/28/2022          INTERPRETATION:  CLINICAL INFORMATION: Follow-up bilateral hydronephrosis    COMPARISON: Abdominal CT dated 9/24/2022    TECHNIQUE: Sonography of the kidneys and bladder.    FINDINGS:  Right kidney: 9.9 cm. 1.7 cm cyst in the upper pole of the right kidney.   Mild right hydronephrosis, grossly unchanged. No evidence for a calculus   or mass in the right kidney.    Left kidney: 10.6 cm. Mild left hydronephrosis, grossly unchanged. No   evidence for a calculus, or mass.    IMPRESSION:  Mild bilateral hydronephrosis.    --- End of Report ---    < end of copied text >      < from: CT Abdomen and Pelvis No Cont (09.24.22 @ 17:45) >    ACC: [id number] EXAM:  CT ABDOMEN AND PELVIS                          PROCEDURE DATE:  09/24/2022          INTERPRETATION:  CLINICAL INFORMATION: Abdominal distention, fluid   retention, worsening renal function. Recent admission for right hip   fracture.    COMPARISON: CT abdomen pelvis 7/18/2015.    CONTRAST/COMPLICATIONS:  IV Contrast: NONE  Oral Contrast: NONE  Complications: None reported at time of study completion    PROCEDURE:  CT of the Abdomen and Pelvis was performed.  Sagittal and coronal reformats were performed.    FINDINGS:  Evaluation of the solid organs and vasculature is limited without   intravenous contrast.    LOWER CHEST: Small bilateral pleural effusions.    LIVER: Within normal limits.  BILE DUCTS: Normal caliber.  GALLBLADDER: Not visualized, may be surgically absent.  SPLEEN: Within normal limits.  PANCREAS: Within normal limits.  ADRENALS: Within normal limits.  KIDNEYS/URETERS: Mild bilateral hydroureteronephrosis.    BLADDER: Moderately distended with small focus of air.  REPRODUCTIVE ORGANS: Guardado catheter balloon inflated in the vagina.    BOWEL: Small hiatal hernia. Mild air-fluid distention of the small bowel   without discrete transition point. Ascending and transverse colon   distended with air and fluid. Moderate amount of formed stool scattered   throughout the distal transverse colon to the rectum. Findings are   suggestive of ileus. Appendix is normal.  PERITONEUM: No ascites.  VESSELS: Atherosclerotic changes.  RETROPERITONEUM/LYMPH NODES: No lymphadenopathy.  ABDOMINAL WALL: Subcutaneous edema. Small foci of subcutaneous air in the   ventral abdominal wall, likely related to subcutaneous injection.  BONES: Degenerative changes. Old left superior and inferior pubic bone   fractures. Status post ORIF of a comminuted right intertrochanteric   femoral fracture.    IMPRESSION:  Guardado catheter balloon inflated in the vagina.    Moderately distended bladder with mild bilateral hydroureteronephrosis.    Small bowel and large bowel dilatation without discrete transition point,   suggestive of ileus. Moderate stool scattered throughout the distal colon.    --- End of Report ---    < end of copied text >      Imaging Personally Reviewed:  YES/NO    Consultant(s) Notes Reviewed:   YES/ No    Care Discussed with Consultants :     Plan of care was discussed with patient and /or primary care giver; all questions and concerns were addressed and care was aligned with patient's wishes.

## 2022-09-29 NOTE — PROGRESS NOTE ADULT - PROBLEM SELECTOR PLAN 1
- P/w chronic constipation and ileus  - Possibly in the setting of post-op anesthesia vs. opioid induced constipation  - Abd XR (9/23) as outpatient showed generalized ileus  - CT A/P showed ileus. Moderate stool scattered throughout the distal colon.  - S/p enema   - Continue to monitor for BM  - Continue to avoid opioids for pain regimen

## 2022-09-29 NOTE — ADVANCED PRACTICE NURSE CONSULT - ASSESSMENT
This is a 89yr old female patient admitted for Acute Renal Failure, to which a wound care consultation was placed for potential wound evaluation; but upon arrival to the unit, the patient is off the unit for a radiological procedure. I will comeback at a later time for evaluation
This is a 89yr old female patient admitted for Acute Renal Failure, presenting with the following:  -There is an intact DTI to the Bilateral Heels without drainage  -There is a linear DTI to the L. Gluteus with epidermal separation, red tissue, and drainage  -There is evidence of blanchable erythema to the Coccyx and R. Gluteus

## 2022-09-29 NOTE — PROGRESS NOTE ADULT - PROBLEM SELECTOR PLAN 2
- Most likely pre-renal d/t decreased PO intake   - S/p Renal US mild hydronephrosis   - 9/27 Guardado dc'd and passed TOV.  Pt voiding.    - C/w IVF   - Continue to avoid nephrotoxic medications  - Continue to monitor CMP in AM-f/u results  - Nephro-Dr. Moreland/Dr. Tucker consulted, appreciated - Most likely pre-renal d/t decreased PO intake-Resolved  - S/p Renal US mild hydronephrosis   - 9/27 Guardado dc'd and passed TOV.  Pt voiding.    - Avoid nephrotoxic medications  - Nephro-Dr. Moreland/Dr. Tucker

## 2022-09-29 NOTE — PROGRESS NOTE ADULT - PROBLEM SELECTOR PLAN 6
- P/w bilateral leg edema and pain  - Could be in the setting of post-obstructive RAMSEY  - LE US negative for DVT - Hx of R hip fracture (s/p R hip surgery in 9/15/22)  - Was on Eliquis for DVT prophylaxis.  Changed to Heparin in setting of RAMSEY  - C/w supportive care  - C/w pain meds as needed but avoid opioids due to ileus  - Pt to have staples removed 14d post sx.  Therefore, staples to be removed 9/29.  - Ortho contacted to remove staples

## 2022-09-29 NOTE — PROGRESS NOTE ADULT - PROBLEM SELECTOR PLAN 9
H/o of HTN, home med Metoprolol tartrate  - C/w Metoprolol  - Continue to monitor BP - C/w Heparin for DVT ppx  - C/w Protonix for GI ppx

## 2022-09-30 ENCOUNTER — TRANSCRIPTION ENCOUNTER (OUTPATIENT)
Age: 87
End: 2022-09-30

## 2022-09-30 LAB
ALBUMIN SERPL ELPH-MCNC: 1.7 G/DL — LOW (ref 3.5–5)
ALP SERPL-CCNC: 137 U/L — HIGH (ref 40–120)
ALT FLD-CCNC: 19 U/L DA — SIGNIFICANT CHANGE UP (ref 10–60)
ANION GAP SERPL CALC-SCNC: 6 MMOL/L — SIGNIFICANT CHANGE UP (ref 5–17)
AST SERPL-CCNC: 23 U/L — SIGNIFICANT CHANGE UP (ref 10–40)
BILIRUB SERPL-MCNC: 0.4 MG/DL — SIGNIFICANT CHANGE UP (ref 0.2–1.2)
BUN SERPL-MCNC: 61 MG/DL — HIGH (ref 7–18)
CALCIUM SERPL-MCNC: 8.4 MG/DL — SIGNIFICANT CHANGE UP (ref 8.4–10.5)
CHLORIDE SERPL-SCNC: 112 MMOL/L — HIGH (ref 96–108)
CO2 SERPL-SCNC: 23 MMOL/L — SIGNIFICANT CHANGE UP (ref 22–31)
CREAT SERPL-MCNC: 1.27 MG/DL — SIGNIFICANT CHANGE UP (ref 0.5–1.3)
EGFR: 40 ML/MIN/1.73M2 — LOW
GLUCOSE SERPL-MCNC: 107 MG/DL — HIGH (ref 70–99)
HCT VFR BLD CALC: 29.5 % — LOW (ref 34.5–45)
HGB BLD-MCNC: 8.9 G/DL — LOW (ref 11.5–15.5)
MCHC RBC-ENTMCNC: 30.2 GM/DL — LOW (ref 32–36)
MCHC RBC-ENTMCNC: 30.7 PG — SIGNIFICANT CHANGE UP (ref 27–34)
MCV RBC AUTO: 101.7 FL — HIGH (ref 80–100)
NRBC # BLD: 0 /100 WBCS — SIGNIFICANT CHANGE UP (ref 0–0)
PLATELET # BLD AUTO: 413 K/UL — HIGH (ref 150–400)
POTASSIUM SERPL-MCNC: 4.6 MMOL/L — SIGNIFICANT CHANGE UP (ref 3.5–5.3)
POTASSIUM SERPL-SCNC: 4.6 MMOL/L — SIGNIFICANT CHANGE UP (ref 3.5–5.3)
PROT SERPL-MCNC: 5.7 G/DL — LOW (ref 6–8.3)
RBC # BLD: 2.9 M/UL — LOW (ref 3.8–5.2)
RBC # FLD: 15.1 % — HIGH (ref 10.3–14.5)
SODIUM SERPL-SCNC: 141 MMOL/L — SIGNIFICANT CHANGE UP (ref 135–145)
WBC # BLD: 7.62 K/UL — SIGNIFICANT CHANGE UP (ref 3.8–10.5)
WBC # FLD AUTO: 7.62 K/UL — SIGNIFICANT CHANGE UP (ref 3.8–10.5)

## 2022-09-30 PROCEDURE — 99239 HOSP IP/OBS DSCHRG MGMT >30: CPT

## 2022-09-30 RX ORDER — HYDROMORPHONE HYDROCHLORIDE 2 MG/ML
1 INJECTION INTRAMUSCULAR; INTRAVENOUS; SUBCUTANEOUS
Qty: 0 | Refills: 0 | DISCHARGE
Start: 2022-09-30

## 2022-09-30 RX ORDER — ASCORBIC ACID 60 MG
1 TABLET,CHEWABLE ORAL
Qty: 0 | Refills: 0 | DISCHARGE
Start: 2022-09-30

## 2022-09-30 RX ADMIN — HYDROMORPHONE HYDROCHLORIDE 2 MILLIGRAM(S): 2 INJECTION INTRAMUSCULAR; INTRAVENOUS; SUBCUTANEOUS at 18:09

## 2022-09-30 RX ADMIN — GABAPENTIN 100 MILLIGRAM(S): 400 CAPSULE ORAL at 06:19

## 2022-09-30 RX ADMIN — Medication 650 MILLIGRAM(S): at 13:05

## 2022-09-30 RX ADMIN — Medication 25 MILLIGRAM(S): at 19:35

## 2022-09-30 RX ADMIN — Medication 1 TABLET(S): at 13:05

## 2022-09-30 RX ADMIN — Medication 650 MILLIGRAM(S): at 14:34

## 2022-09-30 RX ADMIN — HYDROMORPHONE HYDROCHLORIDE 2 MILLIGRAM(S): 2 INJECTION INTRAMUSCULAR; INTRAVENOUS; SUBCUTANEOUS at 04:06

## 2022-09-30 RX ADMIN — HYDROMORPHONE HYDROCHLORIDE 2 MILLIGRAM(S): 2 INJECTION INTRAMUSCULAR; INTRAVENOUS; SUBCUTANEOUS at 05:18

## 2022-09-30 RX ADMIN — Medication 25 MILLIGRAM(S): at 06:20

## 2022-09-30 RX ADMIN — SIMVASTATIN 40 MILLIGRAM(S): 20 TABLET, FILM COATED ORAL at 22:32

## 2022-09-30 RX ADMIN — Medication 650 MILLIGRAM(S): at 18:12

## 2022-09-30 RX ADMIN — Medication 500 MILLIGRAM(S): at 13:04

## 2022-09-30 RX ADMIN — HEPARIN SODIUM 5000 UNIT(S): 5000 INJECTION INTRAVENOUS; SUBCUTANEOUS at 13:07

## 2022-09-30 RX ADMIN — HEPARIN SODIUM 5000 UNIT(S): 5000 INJECTION INTRAVENOUS; SUBCUTANEOUS at 22:32

## 2022-09-30 RX ADMIN — HEPARIN SODIUM 5000 UNIT(S): 5000 INJECTION INTRAVENOUS; SUBCUTANEOUS at 06:20

## 2022-09-30 RX ADMIN — HYDROMORPHONE HYDROCHLORIDE 2 MILLIGRAM(S): 2 INJECTION INTRAMUSCULAR; INTRAVENOUS; SUBCUTANEOUS at 15:28

## 2022-09-30 RX ADMIN — HYDROMORPHONE HYDROCHLORIDE 2 MILLIGRAM(S): 2 INJECTION INTRAMUSCULAR; INTRAVENOUS; SUBCUTANEOUS at 18:08

## 2022-09-30 RX ADMIN — HYDROMORPHONE HYDROCHLORIDE 2 MILLIGRAM(S): 2 INJECTION INTRAMUSCULAR; INTRAVENOUS; SUBCUTANEOUS at 10:19

## 2022-09-30 RX ADMIN — Medication 81 MILLIGRAM(S): at 13:04

## 2022-09-30 RX ADMIN — Medication 650 MILLIGRAM(S): at 01:04

## 2022-09-30 RX ADMIN — Medication 650 MILLIGRAM(S): at 19:37

## 2022-09-30 RX ADMIN — GABAPENTIN 100 MILLIGRAM(S): 400 CAPSULE ORAL at 18:12

## 2022-09-30 RX ADMIN — Medication 650 MILLIGRAM(S): at 07:35

## 2022-09-30 RX ADMIN — Medication 650 MILLIGRAM(S): at 23:56

## 2022-09-30 RX ADMIN — Medication 650 MILLIGRAM(S): at 06:20

## 2022-09-30 RX ADMIN — SENNA PLUS 2 TABLET(S): 8.6 TABLET ORAL at 22:32

## 2022-09-30 NOTE — DISCHARGE NOTE PROVIDER - ATTENDING DISCHARGE PHYSICAL EXAMINATION:
Gen: NAD  Neuro: alert, answering qs appropriately, moves all extremities  HEENT: anicteric, moist oral mucosa  Neck: supple, no JVD elevation  Cards: RRR, no mrg  Pulm: good inspiratory effort, CTAB  Abd: soft, NT/ND, BS+  Ext: +1 pitting edema LE BL  Skin: warm, dry   Gen: NAD  Neuro: alert, answering qs appropriately, moves all extremities  HEENT: anicteric, moist oral mucosa  Neck: supple, no JVD elevation  Cards: RRR, no mrg  Pulm: good inspiratory effort, CTAB  Abd: soft, NT/ND, BS+  Ext: +1 pitting edema LE BL  Skin: warm, dry    ===================================   ADDENDUM  Pt had active covid infection during this admission.

## 2022-09-30 NOTE — PROGRESS NOTE ADULT - SUBJECTIVE AND OBJECTIVE BOX
[facility] NEPHROLOGY- PROGRESS NOTE    89yo F PMHx of HTN, HLD, osteoporosis, recent R hip repair / resolving RAMSEY presenting with ileus and recurrent RAMSEY. Nephrology consulted for Elevated serum creatinine.  +COVID 19; now off isolation    Hospital Medications: Medications reviewed.  REVIEW OF SYSTEMS:  CONSTITUTIONAL: No fevers or chills  RESPIRATORY: No shortness of breath  CARDIOVASCULAR: No chest pain.  GASTROINTESTINAL: No nausea, vomiting, diarrhea or abdominal pain.   VASCULAR: No bilateral lower extremity edema.  +Rt hip pain    VITALS:  T(F): 98 (22 @ 12:57), Max: 98.5 (22 @ 05:30)  HR: 98 (22 @ 12:57)  BP: 134/60 (22 @ 12:57)  RR: 17 (22 @ 12:57)  SpO2: 100% (22 @ 12:57)  Wt(kg): --     @ 07:01  -   @ 07:00  --------------------------------------------------------  IN: 0 mL / OUT: 750 mL / NET: -750 mL      PHYSICAL EXAM:  Constitutional: NAD  Neurological: Awake and Alert  HEENT: anicteric sclera,   Respiratory: CTAB, no wheezes, rales or rhonchi  Cardiovascular: S1, S2, RRR  Gastrointestinal: BS+, soft, NT/ND  : +primafit.  Extremities: No peripheral edema    LABS:      141  |  112<H>  |  61<H>  ----------------------------<  107<H>  4.6   |  23  |  1.27    Ca    8.4      30 Sep 2022 06:03  Phos  3.9       Mg     2.1         TPro  5.7<L>  /  Alb  1.7<L>  /  TBili  0.4  /  DBili      /  AST  23  /  ALT  19  /  AlkPhos  137<H>      Creatinine Trend: 1.27 <--, 1.30 <--, 1.31 <--, 1.56 <--, 2.18 <--, 2.91 <--, 3.01 <--                        8.9    7.62  )-----------( 413      ( 30 Sep 2022 06:03 )             29.5     Urine Studies:  Urinalysis Basic - ( 25 Sep 2022 03:17 )    Color: Yellow / Appearance: Clear / S.020 / pH:   Gluc:  / Ketone: Negative  / Bili: Small / Urobili: Negative   Blood:  / Protein: 15 / Nitrite: Negative   Leuk Esterase: Small / RBC: 5-10 /HPF / WBC 26-50 /HPF   Sq Epi:  / Non Sq Epi: Few /HPF / Bacteria: Many /HPF      Creatinine, Random Urine: 54 mg/dL ( @ 13:33)  Sodium, Random Urine: 8 mmol/L ( @ 03:17)  Creatinine, Random Urine: 111 mg/dL ( @ 03:17)  Osmolality, Random Urine: 357 mos/kg ( @ 03:17)  Potassium, Random Urine: 42 mmol/L ( @ 03:17)

## 2022-09-30 NOTE — PROGRESS NOTE ADULT - ASSESSMENT
89yo F PMHx of HTN, HLD, osteoporosis, recent R hip repair / resolving RAMSEY presenting with ileus and recurrent RAMSEY. Nephrology consulted for Elevated serum creatinine.      1. RAMSEY- unknown baseline SCr; sadie SCr 1.2-1.3. RAMSEY likely partially due to obstruction given urinary retention with hydronephrosis/ pre-renal component. Renal function now improving. Valle removed; pt on primafit. Can monitor off IVF; please encourage PO fluid intake. If worsening renal function please check bladder scan. UA active in the setting of UTI; Finished Ceftriaxone. FENa+ 0.17%- consistent wit pre-renal RAMSEY. Strict I/Os. Avoid nephrotoxins/ NSAIDs/ RCA. Monitor BMP.  2. Hydronephrosis 2/2 urinary retention- continue valle for now.   Abx per primary team. Renal US with persistent b/l mild hydro.   3. Hyponatremia- hypovolemic- resolved with NS IVF. Monitor serum Na.   4. LE edema due in part to DVTs, in part due to hypoalbuminemia in the setting of PCM with decreased po intake and also illness with covid-19.  AC per primary team.  c/w Nepro protein supplements.   Spot urine protein/creatinine ratio 0.7; non nephrotic range. NO DIURETICS as pt is intravascularly volume deplete.          [facility] NEPHROLOGY  Sai Moreland M.D.  Matt Mcguire D.O.  Lo Tucker M.D.  Charlette Hobbs, MITRA, ANP-C    Telephone: [phone]  Facsimile: [phone]    [address] [place] [address]

## 2022-09-30 NOTE — DISCHARGE NOTE PROVIDER - CARE PROVIDER_API CALL
NORMA RODRIGUES  Internal Medicine  [address] [place] [address]  Phone: [phone]  Fax: [phone]  Follow Up Time: 1 week    Lo Tucker)  Internal Medicine  [address] [place] [address]  Phone: [phone]  Fax: [phone]  Follow Up Time: 1 week

## 2022-09-30 NOTE — DISCHARGE NOTE PROVIDER - NSDCCPCAREPLAN_GEN_ALL_CORE_FT
PRINCIPAL DISCHARGE DIAGNOSIS  Diagnosis: RAMSEY (acute kidney injury)  Assessment and Plan of Treatment: You were sent to hospital with impaired kidney function likely due to dehydration.  You were treated with intravenous fluids with improved kidney function.  Your BUN/Cr today is 61/1.27 which is normal.  -Follow up kidney function weekly  -Follow up with nephrologist (contact on next page)  -Avoid medications that can further damage your kidneys such as Advil, Ibuprophen, IV dye etc.  -Maintain adequate hydration at all times.      SECONDARY DISCHARGE DIAGNOSES  Diagnosis: Urinary retention  Assessment and Plan of Treatment: You presented with urinary retention likely due to constipation.  A valle catheter was placed to relieve your retention.  Valle has been removed and no further urinary retention was noted as your constipation has resolved.  -Avoid constipation  -Maintain adequate hydration  -Follow up with primary care provider within one week.    Diagnosis: Bilateral leg edema  Assessment and Plan of Treatment: likely due to immobility.  You were followed by physical therapist daily  however you persistently declined participation.  Your activity level has not improved despited using Hebrew speaking  to motivate you to cooperate,  -Please increase your activity level as tolerated  -Pain management as prescribed and needed    Diagnosis: Ileus  Assessment and Plan of Treatment: You were found with ileus on CT scan of abdomen.  An ileus is a gastrointestinal condition in which digested material is prevented from passing normally through the bowel.  This has likely happened due to your chronic constipation.  You were treated with enema and laxatives with resolution of your ileus.  -Please avoid constipation by increasing dietary fiber, adequate hydration and laxatives as needed.      Diagnosis: Status post-operative repair of closed fracture of right hip  Assessment and Plan of Treatment: You were seen by ortho.  Right hip staples were removed and steri strips applied.  Your incision is clean, dry and intact.  -Physical therapy as tolerated  -Pain management as prescribed

## 2022-09-30 NOTE — DISCHARGE NOTE PROVIDER - HOSPITAL COURSE
89 y/o F, Ivorian-speaking from [facility] with PMHx of HTN, HLD, s/p R hip surgery (Intramedullary nailing of right hip done 9/15/22), osteoporosis, +COVID from 7/20 who was discharged to rehab facility after surgery. Pt. noted with progressive worsening of bilateral leg edema, increasing difficulty swallowing (mostly liquids), difficult urination, constipation, and increased abdominal distention. . Abd XR from 9/23 showed generalized ileus and CXR from same date was unremarkable. In the ED, VS were stable, Lab showed elevated SCr 3.01, low Na of 131, inc. LFT , AST/ALT - 94/75. WBC is also elevated at 16.45 with no clear signs of infection. 89 y/o F, Prydeinig-speaking from [facility] with PMHx of HTN, HLD, s/p R hip surgery (Intramedullary nailing of right hip done 9/15/22), osteoporosis, +COVID from 7/20 who was discharged post operatively on 9/20/22 to rehab facility. Pt. noted with progressive worsening of bilateral leg edema, increasing difficulty swallowing (mostly liquids), difficult urination, constipation, and increased abdominal distention.  Abd XR from 9/23 showed generalized ileus and CXR from same date was unremarkable. In the ED, VS were stable, Lab showed elevated SCr 3.01, low Na of 131, inc. LFT , AST/ALT - 94/75. WBC is also elevated at 16.45 with no clear signs of infection.  Pt. admitted to medicine for Ileus/RAMSEY/Mild B/L Hydro and constipation.  Pt. treated with enema, laxatives and IVF with resolution of ileus and constipation.  RAMSEY treated with IVF and resolved.    Pt.''s right hip staples removed by ortho, incision site with steri strips.  Pt. has been followed by PT daily however noted with guarded cooperation and motivation.	  Pt. is medically stable for discharge to [facility],  following. 89 y/o F, Czech-speaking from [facility] with PMHx of HTN, HLD, s/p R hip surgery (Intramedullary nailing of right hip done 9/15/22), osteoporosis, +COVID from 7/20 who was discharged post operatively on 9/20/22 to rehab facility. Pt. noted with progressive worsening of bilateral leg edema, increasing difficulty swallowing (mostly liquids), difficult urination, constipation, and increased abdominal distention.  Abd XR from 9/23 showed generalized ileus and CXR from same date was unremarkable. In the ED, VS were stable, Lab showed elevated SCr 3.01, low Na of 131, inc. LFT , AST/ALT - 94/75. WBC is also elevated at 16.45 with no clear signs of infection.  Pt. admitted to medicine for Ileus/RAMSEY/Mild B/L Hydro and constipation.  Pt. treated with enema, laxatives and IVF with resolution of ileus and constipation.  RAMSEY treated with IVF and resolved.    Pt.''s right hip staples removed by ortho, incision site with steri strips.  Pt. has been followed by PT daily however noted with guarded cooperation and motivation.	  Pt. is medically stable for discharge to [facility] following.    Please note this is only a summary, refer to the full chart for further details.

## 2022-09-30 NOTE — DISCHARGE NOTE PROVIDER - PROVIDER TOKENS
PROVIDER:[TOKEN:[[id number]],FOLLOWUP:[1 week]],PROVIDER:[TOKEN:[[id number]],FOLLOWUP:[1 week]]

## 2022-09-30 NOTE — PROGRESS NOTE ADULT - SUBJECTIVE AND OBJECTIVE BOX
PROGRESS NOTE       ID#[id number]  Patient is a 89 y/o F, Urdu-speaking from [facility] with a PMHx of HTN, osteoporosis, HLD, and a PSHx of  R hip intramedullary nailing on 9/15/22, , +COVID from 7/20. Patient was discharged from [facility] post-operatively on 9/20/2022 and was sent back to [facility] on 9/24/2022 where she is now admitted for b/l leg swelling and an ileus. Pt denies Chest pain, SOB, dyspnea, paresthesias, syncope, or pain anywhere else.   Patient not complaining of Right hip pain, but has pain in her legs with swelling. patient denies getting up with PT.       Vital Signs Last 24 Hrs  T(C): 36.9 (09-30-22 @ 05:30), Max: 36.9 (09-30-22 @ 05:30)  T(F): 98.5 (09-30-22 @ 05:30), Max: 98.5 (09-30-22 @ 05:30)  HR: 88 (09-30-22 @ 05:30) (70 - 94)  BP: 126/59 (09-30-22 @ 05:30) (119/61 - 154/54)  BP(mean): --  RR: 17 (09-30-22 @ 05:30) (17 - 17)  SpO2: 99% (09-30-22 @ 05:30) (98% - 100%)        Physical Exam:  General: Alert and oriented, NAD, resting comfortably  Musculoskeletal:  Right hip: Skin warm and pink. Swelling noted throughout the right leg. Steristrips intact. No active drainage. Leg in Nl. alignement.   Lower extremities: Calves soft and NTTP b/l. SILT. NVI. (+)EHL/FHL/ADF/APF intact bilaterally    Labs:                                                8.9    7.62  )-----------( 413      ( 30 Sep 2022 06:03 )             29.5   09-30    141  |  112<H>  |  61<H>  ----------------------------<  107<H>  4.6   |  23  |  1.27    Ca    8.4      30 Sep 2022 06:03  Phos  3.9     09-29  Mg     2.1     09-29    TPro  5.7<L>  /  Alb  1.7<L>  /  TBili  0.4  /  DBili  x   /  AST  23  /  ALT  19  /  AlkPhos  137<H>  09-30        Radiology:    Impression: Patient is a 89yFemale with s/p right hip IM Nail on 9/15/2022  Plan:  - Pain control  - DVT prophylaxis  - Daily PT - WBAT to RLE with appropriate assistive device  -  f/u Xrays of Right femur and right hip   - Case d/w Dr. Encinas

## 2022-10-01 ENCOUNTER — TRANSCRIPTION ENCOUNTER (OUTPATIENT)
Age: 87
End: 2022-10-01

## 2022-10-01 VITALS
HEART RATE: 103 BPM | RESPIRATION RATE: 20 BRPM | SYSTOLIC BLOOD PRESSURE: 111 MMHG | TEMPERATURE: 99 F | DIASTOLIC BLOOD PRESSURE: 50 MMHG | OXYGEN SATURATION: 96 %

## 2022-10-01 PROCEDURE — 99232 SBSQ HOSP IP/OBS MODERATE 35: CPT

## 2022-10-01 RX ORDER — LIDOCAINE 4 G/100G
1 CREAM TOPICAL DAILY
Refills: 0 | Status: DISCONTINUED | OUTPATIENT
Start: 2022-10-01 | End: 2022-10-01

## 2022-10-01 RX ADMIN — HYDROMORPHONE HYDROCHLORIDE 2 MILLIGRAM(S): 2 INJECTION INTRAMUSCULAR; INTRAVENOUS; SUBCUTANEOUS at 15:35

## 2022-10-01 RX ADMIN — Medication 650 MILLIGRAM(S): at 15:35

## 2022-10-01 RX ADMIN — Medication 650 MILLIGRAM(S): at 17:36

## 2022-10-01 RX ADMIN — LIDOCAINE 1 PATCH: 4 CREAM TOPICAL at 13:51

## 2022-10-01 RX ADMIN — Medication 650 MILLIGRAM(S): at 01:38

## 2022-10-01 RX ADMIN — Medication 25 MILLIGRAM(S): at 17:51

## 2022-10-01 RX ADMIN — Medication 1 TABLET(S): at 13:52

## 2022-10-01 RX ADMIN — Medication 650 MILLIGRAM(S): at 05:05

## 2022-10-01 RX ADMIN — Medication 650 MILLIGRAM(S): at 13:53

## 2022-10-01 RX ADMIN — HYDROMORPHONE HYDROCHLORIDE 2 MILLIGRAM(S): 2 INJECTION INTRAMUSCULAR; INTRAVENOUS; SUBCUTANEOUS at 09:30

## 2022-10-01 RX ADMIN — HEPARIN SODIUM 5000 UNIT(S): 5000 INJECTION INTRAVENOUS; SUBCUTANEOUS at 13:53

## 2022-10-01 RX ADMIN — GABAPENTIN 100 MILLIGRAM(S): 400 CAPSULE ORAL at 05:03

## 2022-10-01 RX ADMIN — Medication 500 MILLIGRAM(S): at 13:52

## 2022-10-01 RX ADMIN — Medication 650 MILLIGRAM(S): at 06:19

## 2022-10-01 RX ADMIN — HEPARIN SODIUM 5000 UNIT(S): 5000 INJECTION INTRAVENOUS; SUBCUTANEOUS at 05:04

## 2022-10-01 RX ADMIN — LIDOCAINE 1 PATCH: 4 CREAM TOPICAL at 13:50

## 2022-10-01 RX ADMIN — Medication 25 MILLIGRAM(S): at 05:09

## 2022-10-01 RX ADMIN — Medication 81 MILLIGRAM(S): at 13:52

## 2022-10-01 RX ADMIN — GABAPENTIN 100 MILLIGRAM(S): 400 CAPSULE ORAL at 17:36

## 2022-10-01 RX ADMIN — HYDROMORPHONE HYDROCHLORIDE 2 MILLIGRAM(S): 2 INJECTION INTRAMUSCULAR; INTRAVENOUS; SUBCUTANEOUS at 14:38

## 2022-10-01 NOTE — PROGRESS NOTE ADULT - ASSESSMENT
89F PMH osteoporosis, HTN, recent hip fxr 4d prior to presentation, obstructive uropathy, p/w ileus/constipation and RAMSEY. RAMSEY improving with valle and IVF. Constipation improving with bowel regimen. Still with RLE pain post-hip surgery.    #Ileus, improving  #constipation, improving  #RAMSEY, resolved  #urinary retention, resolved  #B/L LE edema  #hypoalbumin  #transaminitis  #anemia, stable  #hip fxr s/p surgery 9/15/22  #HTN  #HLD  #COVID    -c/w full diet; c/w bowel regimen, zofran prn nausea  -B/L LE edema - give compression stockings, c/w elevation  -f/u dietician recs: cw nepro tid + vit C  -pain: tylenol standing, prn dilaudid, lidocaine patch  -pending placement to [facility]

## 2022-10-01 NOTE — PROGRESS NOTE ADULT - SUBJECTIVE AND OBJECTIVE BOX
Interval of present illness: No acute events overnight. Pt seen at bedside. No new complaints.    REVIEW OF SYSTEMS:    CONSTITUTIONAL: No fever  EYES: No acute visual disturbances  NECK: No pain or stiffness  RESPIRATORY: No cough; No shortness of breath  CARDIOVASCULAR: No chest pain, no palpitations  GASTROINTESTINAL: No pain. No nausea or vomiting.  No diarrhea   NEUROLOGICAL: No headache or numbness, no tremors  MUSCULOSKELETAL: RLE pain+  GENITOURINARY: No dysuria, no frequency, no hesitancy  PSYCHIATRY: No depression , no anxiety  ALL OTHER  ROS negative     O:  Vital Signs Last 24 Hrs  T(C): 36.9 (01 Oct 2022 04:22), Max: 36.9 (30 Sep 2022 21:40)  T(F): 98.4 (01 Oct 2022 04:22), Max: 98.4 (30 Sep 2022 21:40)  HR: 104 (01 Oct 2022 04:22) (75 - 104)  BP: 142/54 (01 Oct 2022 04:22) (117/74 - 142/54)  BP(mean): --  RR: 18 (01 Oct 2022 04:22) (17 - 18)  SpO2: 98% (01 Oct 2022 04:22) (98% - 100%)    Parameters below as of 01 Oct 2022 04:22  Patient On (Oxygen Delivery Method): room air        Gen: NAD, laying in bed  Neuro: alert, answering qs appropriately, moves all extremities  HEENT: anicteric, moist oral mucosa  Neck: supple, no JVD elevation  Cards: RRR  Pulm: good inspiratory effort, CTAB  Abd: soft, NT/ND, BS+  Ext: BL LE edema, R hip bandage  Skin: warm, dry      acetaminophen     Tablet .. 650 milliGRAM(s) Oral every 6 hours  ascorbic acid 500 milliGRAM(s) Oral daily  aspirin  chewable 81 milliGRAM(s) Oral daily  bisacodyl 5 milliGRAM(s) Oral every 12 hours PRN  gabapentin 100 milliGRAM(s) Oral two times a day  heparin   Injectable 5000 Unit(s) SubCutaneous every 8 hours  HYDROmorphone   Tablet 2 milliGRAM(s) Oral every 6 hours PRN  influenza  Vaccine (HIGH DOSE) 0.7 milliLiter(s) IntraMuscular once  lidocaine   4% Patch 1 Patch Transdermal daily  lidocaine   4% Patch 1 Patch Transdermal daily  metoprolol tartrate 25 milliGRAM(s) Oral two times a day  Nephro-dl 1 Tablet(s) Oral daily  polyethylene glycol 3350 17 Gram(s) Oral daily  senna 2 Tablet(s) Oral at bedtime  simvastatin 40 milliGRAM(s) Oral at bedtime                            8.9    7.62  )-----------( 413      ( 30 Sep 2022 06:03 )             29.5       09-30    141  |  112<H>  |  61<H>  ----------------------------<  107<H>  4.6   |  23  |  1.27    Ca    8.4      30 Sep 2022 06:03    TPro  5.7<L>  /  Alb  1.7<L>  /  TBili  0.4  /  DBili  x   /  AST  23  /  ALT  19  /  AlkPhos  137<H>  09-30

## 2022-10-01 NOTE — DISCHARGE NOTE NURSING/CASE MANAGEMENT/SOCIAL WORK - NSDCPEFALRISK_GEN_ALL_CORE
For information on Fall & Injury Prevention, visit: [email or web address] OR  [email or web address] OR  https://www.cdc.gov/steadi/patient.html

## 2022-10-01 NOTE — DISCHARGE NOTE NURSING/CASE MANAGEMENT/SOCIAL WORK - NSDCVIVACCINE_GEN_ALL_CORE_FT
"Nurse's notes reviewed and accepted. 39year old female with three day history of body aches, with no complaint of sore throat, sinus congestion, cough, or other URI symptoms. No trouble breathing. Pt. Is eating well. Normal bowel movements. On each of the past two days the Pt. Took 800mg. Ibuprofen, and each time she felt completely better. Pt. Reports history of recurrent pneumonia. She has no trouble with any breathing issues at this time. No cough, wheezing. Past Medical History:   Diagnosis Date   â¢ Anemia    â¢ Anesthesia complication     respiratory failure with  and breast surgery per pt. ""I desat and end up on ventilator\""   â¢ Anxiety    â¢ Asthma    â¢ Bronchiectasis (CMS/[facility]) 2014    Admitted [facility]   â¢ DEPRESSION    â¢ Gastroesophageal reflux disease    â¢ HYPERTENSION    â¢ Pneumonia      PHYSICAL EXAM:  Gen: Afebrile. Eyes:  No redness, discharge. Ears: Tympanic membranes clear and normal appearing bilaterally. Nose: No flaring or discharge present  Sinuses: No frontal or maxillary sinus tenderness. Throat: Oropharynx clear, no redness or exudate present. Neck: Supple with no significant adenopathy. Lungs: Clear to auscultation, no wheezing or rhonchi noted. ASSESSMENT: (R52) Body aches  (primary encounter diagnosis)  Comment: Body aches with  No focus for two days. Each time, all symptoms resolved with ibuprofen. Plan: Pt. Will continue ibuprofen, 800mg. Every eight hours. She is to RTC if any new focus of infection develops.       "
Patient arrives to walk in clinic with complaint of fever of 100.1, chills, body aches, fatigue, loose stool . Onset four days. Took ibuprofen- feels much better. Denies latex allergy. Allergies reviewed. PCP per banner. Medications verified and up to date. Rx up to date.
influenza, injectable, quadrivalent, preservative free; 20-Sep-2022 18:35; Ling Barrios (ESTEBAN); [employer]; [id number] (Exp. Date: 30-Jun-2023); IntraMuscular; Deltoid Right.; 0.5 milliLiter(s); VIS (VIS Published: 06-Aug-2021, VIS Presented: 20-Sep-2022);

## 2022-10-01 NOTE — DISCHARGE NOTE NURSING/CASE MANAGEMENT/SOCIAL WORK - PATIENT PORTAL LINK FT
You can access the FollowMyHealth Patient Portal offered by [facility] by registering at the following website: [email or web address]. By joining [facility]’s FollowMyHealth portal, you will also be able to view your health information using other applications (apps) compatible with our system.

## 2022-10-01 NOTE — PROGRESS NOTE ADULT - PROVIDER SPECIALTY LIST ADULT
Internal Medicine
Internal Medicine
Nephrology
Orthopedics
Nephrology
Internal Medicine

## 2022-10-20 PROCEDURE — 97530 THERAPEUTIC ACTIVITIES: CPT

## 2022-10-20 PROCEDURE — 83735 ASSAY OF MAGNESIUM: CPT

## 2022-10-20 PROCEDURE — 74176 CT ABD & PELVIS W/O CONTRAST: CPT

## 2022-10-20 PROCEDURE — 84133 ASSAY OF URINE POTASSIUM: CPT

## 2022-10-20 PROCEDURE — 36415 COLL VENOUS BLD VENIPUNCTURE: CPT

## 2022-10-20 PROCEDURE — 93970 EXTREMITY STUDY: CPT

## 2022-10-20 PROCEDURE — 84300 ASSAY OF URINE SODIUM: CPT

## 2022-10-20 PROCEDURE — 71045 X-RAY EXAM CHEST 1 VIEW: CPT

## 2022-10-20 PROCEDURE — 84156 ASSAY OF PROTEIN URINE: CPT

## 2022-10-20 PROCEDURE — 83935 ASSAY OF URINE OSMOLALITY: CPT

## 2022-10-20 PROCEDURE — 76775 US EXAM ABDO BACK WALL LIM: CPT

## 2022-10-20 PROCEDURE — 97110 THERAPEUTIC EXERCISES: CPT

## 2022-10-20 PROCEDURE — 81001 URINALYSIS AUTO W/SCOPE: CPT

## 2022-10-20 PROCEDURE — 99285 EMERGENCY DEPT VISIT HI MDM: CPT

## 2022-10-20 PROCEDURE — 82248 BILIRUBIN DIRECT: CPT

## 2022-10-20 PROCEDURE — 80053 COMPREHEN METABOLIC PANEL: CPT

## 2022-10-20 PROCEDURE — 82570 ASSAY OF URINE CREATININE: CPT

## 2022-10-20 PROCEDURE — 85025 COMPLETE CBC W/AUTO DIFF WBC: CPT

## 2022-10-20 PROCEDURE — 87086 URINE CULTURE/COLONY COUNT: CPT

## 2022-10-20 PROCEDURE — 87635 SARS-COV-2 COVID-19 AMP PRB: CPT

## 2022-10-20 PROCEDURE — 84540 ASSAY OF URINE/UREA-N: CPT

## 2022-10-20 PROCEDURE — 84100 ASSAY OF PHOSPHORUS: CPT

## 2022-10-20 PROCEDURE — 82962 GLUCOSE BLOOD TEST: CPT

## 2022-10-20 PROCEDURE — 85027 COMPLETE CBC AUTOMATED: CPT

## 2022-11-03 ENCOUNTER — EMERGENCY (EMERGENCY)
Facility: HOSPITAL | Age: 87
LOS: 1 days | Discharge: HOME | End: 2022-11-03
Attending: EMERGENCY MEDICINE
Payer: COMMERCIAL

## 2022-11-03 VITALS
DIASTOLIC BLOOD PRESSURE: 56 MMHG | OXYGEN SATURATION: 98 % | TEMPERATURE: 99 F | RESPIRATION RATE: 16 BRPM | HEART RATE: 73 BPM | HEIGHT: 64 IN | WEIGHT: 139.99 LBS | SYSTOLIC BLOOD PRESSURE: 126 MMHG

## 2022-11-03 VITALS
DIASTOLIC BLOOD PRESSURE: 70 MMHG | RESPIRATION RATE: 18 BRPM | SYSTOLIC BLOOD PRESSURE: 118 MMHG | TEMPERATURE: 100 F | HEART RATE: 86 BPM | OXYGEN SATURATION: 96 %

## 2022-11-03 DIAGNOSIS — S72.009A FRACTURE OF UNSPECIFIED PART OF NECK OF UNSPECIFIED FEMUR, INITIAL ENCOUNTER FOR CLOSED FRACTURE: Chronic | ICD-10-CM

## 2022-11-03 LAB
ALBUMIN SERPL ELPH-MCNC: 2.1 G/DL — LOW (ref 3.5–5)
ALP SERPL-CCNC: 155 U/L — HIGH (ref 40–120)
ALT FLD-CCNC: 19 U/L DA — SIGNIFICANT CHANGE UP (ref 10–60)
ANION GAP SERPL CALC-SCNC: 8 MMOL/L — SIGNIFICANT CHANGE UP (ref 5–17)
AST SERPL-CCNC: 17 U/L — SIGNIFICANT CHANGE UP (ref 10–40)
BASOPHILS # BLD AUTO: 0.02 K/UL — SIGNIFICANT CHANGE UP (ref 0–0.2)
BASOPHILS NFR BLD AUTO: 0.2 % — SIGNIFICANT CHANGE UP (ref 0–2)
BILIRUB SERPL-MCNC: 0.4 MG/DL — SIGNIFICANT CHANGE UP (ref 0.2–1.2)
BLD GP AB SCN SERPL QL: SIGNIFICANT CHANGE UP
BUN SERPL-MCNC: 25 MG/DL — HIGH (ref 7–18)
CALCIUM SERPL-MCNC: 8.9 MG/DL — SIGNIFICANT CHANGE UP (ref 8.4–10.5)
CHLORIDE SERPL-SCNC: 103 MMOL/L — SIGNIFICANT CHANGE UP (ref 96–108)
CO2 SERPL-SCNC: 27 MMOL/L — SIGNIFICANT CHANGE UP (ref 22–31)
CREAT SERPL-MCNC: 0.83 MG/DL — SIGNIFICANT CHANGE UP (ref 0.5–1.3)
EGFR: 66 ML/MIN/1.73M2 — SIGNIFICANT CHANGE UP
EOSINOPHIL # BLD AUTO: 0.02 K/UL — SIGNIFICANT CHANGE UP (ref 0–0.5)
EOSINOPHIL NFR BLD AUTO: 0.2 % — SIGNIFICANT CHANGE UP (ref 0–6)
GLUCOSE SERPL-MCNC: 125 MG/DL — HIGH (ref 70–99)
HCT VFR BLD CALC: 26.6 % — LOW (ref 34.5–45)
HGB BLD-MCNC: 7.9 G/DL — LOW (ref 11.5–15.5)
IMM GRANULOCYTES NFR BLD AUTO: 1.5 % — HIGH (ref 0–0.9)
LYMPHOCYTES # BLD AUTO: 19.5 % — SIGNIFICANT CHANGE UP (ref 13–44)
LYMPHOCYTES # BLD AUTO: 2.38 K/UL — SIGNIFICANT CHANGE UP (ref 1–3.3)
MCHC RBC-ENTMCNC: 29.7 GM/DL — LOW (ref 32–36)
MCHC RBC-ENTMCNC: 30.3 PG — SIGNIFICANT CHANGE UP (ref 27–34)
MCV RBC AUTO: 101.9 FL — HIGH (ref 80–100)
MONOCYTES # BLD AUTO: 1.03 K/UL — HIGH (ref 0–0.9)
MONOCYTES NFR BLD AUTO: 8.4 % — SIGNIFICANT CHANGE UP (ref 2–14)
NEUTROPHILS # BLD AUTO: 8.56 K/UL — HIGH (ref 1.8–7.4)
NEUTROPHILS NFR BLD AUTO: 70.2 % — SIGNIFICANT CHANGE UP (ref 43–77)
NRBC # BLD: 0 /100 WBCS — SIGNIFICANT CHANGE UP (ref 0–0)
PLATELET # BLD AUTO: 424 K/UL — HIGH (ref 150–400)
POTASSIUM SERPL-MCNC: 3.8 MMOL/L — SIGNIFICANT CHANGE UP (ref 3.5–5.3)
POTASSIUM SERPL-SCNC: 3.8 MMOL/L — SIGNIFICANT CHANGE UP (ref 3.5–5.3)
PROT SERPL-MCNC: 7.2 G/DL — SIGNIFICANT CHANGE UP (ref 6–8.3)
RBC # BLD: 2.61 M/UL — LOW (ref 3.8–5.2)
RBC # FLD: 17 % — HIGH (ref 10.3–14.5)
SODIUM SERPL-SCNC: 138 MMOL/L — SIGNIFICANT CHANGE UP (ref 135–145)
WBC # BLD: 12.19 K/UL — HIGH (ref 3.8–10.5)
WBC # FLD AUTO: 12.19 K/UL — HIGH (ref 3.8–10.5)

## 2022-11-03 PROCEDURE — 93971 EXTREMITY STUDY: CPT

## 2022-11-03 PROCEDURE — 86901 BLOOD TYPING SEROLOGIC RH(D): CPT

## 2022-11-03 PROCEDURE — 73090 X-RAY EXAM OF FOREARM: CPT | Mod: 26,LT

## 2022-11-03 PROCEDURE — 85025 COMPLETE CBC W/AUTO DIFF WBC: CPT

## 2022-11-03 PROCEDURE — 99284 EMERGENCY DEPT VISIT MOD MDM: CPT | Mod: 25

## 2022-11-03 PROCEDURE — 86850 RBC ANTIBODY SCREEN: CPT

## 2022-11-03 PROCEDURE — 86900 BLOOD TYPING SEROLOGIC ABO: CPT

## 2022-11-03 PROCEDURE — 99285 EMERGENCY DEPT VISIT HI MDM: CPT

## 2022-11-03 PROCEDURE — 93971 EXTREMITY STUDY: CPT | Mod: 26,RT

## 2022-11-03 PROCEDURE — 80053 COMPREHEN METABOLIC PANEL: CPT

## 2022-11-03 PROCEDURE — 36415 COLL VENOUS BLD VENIPUNCTURE: CPT

## 2022-11-03 PROCEDURE — 73090 X-RAY EXAM OF FOREARM: CPT

## 2022-11-03 NOTE — ED ADULT NURSE NOTE - OBJECTIVE STATEMENT
Pt BIB EMS from rehab center for abnormal labs. Upon assessment, RUE swelling noted, denies chest pain, no SOB noted. Pt is A&OX2-3 with periods of confusion.

## 2022-11-03 NOTE — ED ADULT NURSE NOTE - CHIEF COMPLAINT QUOTE
BIBA from [facility] for anemia, thrombus r upper arm and b/l le swelling

## 2022-11-03 NOTE — ED PROVIDER NOTE - CLINICAL SUMMARY MEDICAL DECISION MAKING FREE TEXT BOX
89 yr old female from rehab center with hx of HTN, HLD, anemia due to iron deficiency, cholecystectomy, right hip replacement presents to ed for anemia and RUE swelling x 2 weeks. pt asx. not on ac. no acute loss. RUE minimal pain, no numbness or tingling    anemia possibly transfusion. RUE swelling r/o dvt- duplex, labs, xr of forearm

## 2022-11-03 NOTE — ED PROVIDER NOTE - CARDIAC, MLM
Normal rate, regular rhythm.  Heart sounds S1, S2.  No murmurs, rubs or gallops. RUE 4+ pitting edema from hands up to elbow. radial pulse intact

## 2022-11-03 NOTE — ED PROVIDER NOTE - PATIENT PORTAL LINK FT
You can access the FollowMyHealth Patient Portal offered by [facility] by registering at the following website: [email or web address]. By joining [facility]’s FollowMyHealth portal, you will also be able to view your health information using other applications (apps) compatible with our system.

## 2022-11-03 NOTE — ED PROVIDER NOTE - PROGRESS NOTE DETAILS
Alvarado: h/h 7.9 here. BUn mildly elevated. pt RUE shows a superficial basilic vein thrombosis. pt is neuro intact. LUE no swelling. RUE swelling likely from frequent iv use.  dx RUE swelling due to a basilic vein- elevate, warm compress. tyelnol for pain. monitor. h/h 7.9 no need for acute transfusion

## 2022-11-03 NOTE — ED ADULT NURSE NOTE - ED STAT RN HANDOFF DETAILS
Report given to ESTEBAN Galan. Pt resting in bed, no acute distress noted, denies chest pain, no SOB noted.

## 2022-11-03 NOTE — ED PROVIDER NOTE - OBJECTIVE STATEMENT
89 yr old female from rehab center with hx of HTN, HLD, anemia due to iron deficiency, cholecystectomy, right hip replacement presents to ed for anemia and RUE swelling x 2 weeks. pt asx. not on ac. no acute loss. RUE minimal pain, no numbness or tingling

## 2022-11-03 NOTE — ED PROVIDER NOTE - NSFOLLOWUPINSTRUCTIONS_ED_ALL_ED_FT
RUE swelling due to a basilic vein- elevate, warm compress. tyelnol for pain. monitor. h/h 7.9 no need for acute transfusion

## 2023-02-05 NOTE — H&P ADULT. - ATTENDING COMMENTS
Date of Visit:  2/5/2023   Source of Request:  Hospitalist  PCP:  Tunde Colvin MD  Consulting Provider:  Lowell Samayoa MD   Means of Communication:  Pager  REASON FOR REQUEST:  No chief complaint on file.      HISTORY OF PRESENT ILLNESS:    The patient is a 27 year old female with morbid obesity BMI 48, multiple psychiatric disorders including borderline personality, and it will personally disorder, anxiety, depression, type 2 diabetes, asthma, gastritis with reflux disease who presented as a transfer from an outside hospital with right upper quadrant abdominal pain, nausea, vomiting and decreased appetite. Symptoms ongoing for a week, but worse in the last 3 days along with vomiting. Reported fevers on and off at home.  On evaluation CP showed evidence of choledocholithiasis.  Her liver enzymes were also elevated.  She was transferred here for ERCP.    Denied any alcohol or tobacco use.     Initial History and Physical reviewed 2/5/2023  PAST MEDICAL HISTORY:    Asthma                                                        Depression                                                    H/O suicide attempt                                             Comment: multiple    Wrist fracture                                                Seizures (CMS/HCC)                                            Essential (primary) hypertension                              Iron deficiency anemia                                        Gastroesophageal reflux disease                               Postural orthostatic tachycardia syndrome                       Comment: caused by autonomic dysfunction     Diabetes mellitus (CMS/HCC)                                   PAST SURGICAL HISTORY:    COLONOSCOPY DIAGNOSTIC                          04/25/2012    WRIST FRACTURE SURGERY                                          Comment: left    KNEE SURGERY                                    09/14/2012      Comment: left     ESOPHAGOGASTRODUODENOSCOPY TRANSORAL FLEX W/BX* 07/20/2013      Comment: has had multiple    COLONOSCOPY DIAGNOSTIC                          04/25/2012    NASAL HEMORRHAGE CONTROL                        07/05/2013    REMOVAL OF FOREIGN BODY IN MUSCLE OR TENDON SH*                 Comment: multiple    FRACTURE SURGERY                                              REMOVAL OF FOREIGN BODY IN MUSCLE OR TENDON SH* 03/14/2017    FOREIGN BODY REMOVAL                            06/30/2017      Comment: rectal foreign body    ESOPHAGOGASTRODUODENOSCOPY                                      Comment: foreign body removal    KNEE SURGERY                                    08/12/2019      Comment: with patellofemoral reconstruction    CYSTOSCOPY                                      10/22/2019      Comment: cystoscopy with foreign body removal     FLEXIBLE SIGMOIDOSCOPY                          02/06/2020      Comment: Flexible sigmoidoscopy with cold biopsy and                examination under anesthesia    ANAL EXAMINATION UNDER ANESTHESIA               02/06/2020      Comment: Flexible sigmoidoscopy with cold biopsy and                examination under anesthesia    EGD                                             01/30/2020    COLONOSCOPY                                     01/30/2020    EGD                                             05/15/2020      Comment: with flex sig    ESOPHAGOGASTRODUODENOSCOPY                      09/04/2020    ESOPHAGOGASTRODUODENOSCOPY                      12/24/2020    HB EGD W REMOVAL FOREIGN BODY                   09/03/2021    REMOVE FOREIGN BODY COMPLIC                     09/14/2021      Comment: remove foreign body right neck    ESOPHAGOGASTRODUODENOSCOPY TRANSORAL FLEX DIAG  09/08/2021    ABDOMEN SURGERY                                 06/2022       ESOPHAGOGASTRODUODENOSCOPY TRANSORAL FLEX DIAG  12/30/2021    Current Facility-Administered Medications   Medication   • pantoprazole (PROTONIX)  80 mg/100mL in sodium chloride 0.9% infusion   • piperacillin-tazobactam (ZOSYN) 3.375 g in sodium chloride 0.9 % 100 mL IVPB   • sodium chloride 0.9 % flush bag 25 mL   • sodium chloride (PF) 0.9 % injection 2 mL   • sodium chloride (NORMAL SALINE) 0.9 % bolus 500 mL   • sodium chloride 0.9% infusion   • dextrose 50 % injection 25 g   • dextrose 50 % injection 12.5 g   • glucagon (GLUCAGEN) injection 1 mg   • dextrose (GLUTOSE) 40 % gel 15 g   • dextrose (GLUTOSE) 40 % gel 30 g   • insulin lispro (ADMELOG,HumaLOG) - Correction Dose   • HYDROmorphone (DILAUDID) injection 0.4 mg       ALLERGIES:   Allergen Reactions   • Apap [Acetaminophen] ANAPHYLAXIS   • Codeine ANAPHYLAXIS   • Cucumbers [Cucumber] ANAPHYLAXIS   • Fish ANAPHYLAXIS   • Seafood   (Food) ANAPHYLAXIS   • Shellfish Allergy   (Food Or Med) ANAPHYLAXIS   • Toradol SEIZURES and ANAPHYLAXIS   • Tramadol SEIZURES   • Contrast Media RASH   • Etodolac Other (See Comments)     unknown   • Iodine Other (See Comments)   • Iodine   (Environmental Or Med) RASH        Family History   Problem Relation Age of Onset   • Diabetes Mother    • Heart disease Mother    • Asthma Mother    • Diabetes Father    • Heart disease Father        Social History     Socioeconomic History   • Marital status: Single     Spouse name: Not on file   • Number of children: Not on file   • Years of education: Not on file   • Highest education level: Not on file   Occupational History   • Not on file   Tobacco Use   • Smoking status: Never   • Smokeless tobacco: Never   Vaping Use   • Vaping Use: never used   Substance and Sexual Activity   • Alcohol use: No   • Drug use: No   • Sexual activity: Not Currently     Partners: Male   Other Topics Concern   • Not on file   Social History Narrative   • Not on file     Social Determinants of Health     Financial Resource Strain: Low Risk    • Social Determinants: Financial Resource Strain: None   Food Insecurity: No Food Insecurity   • Social  Determinants: Food Insecurity: Never   Transportation Needs: No Transportation Needs   • Lack of Transportation (Medical): No   • Lack of Transportation (Non-Medical): No   Physical Activity: Not on file   Stress: Not on file   Social Connections: Somewhat Isolated   • Social Determinants: Social Connections: 1 or 2 times a week   Intimate Partner Violence: At Risk   • Social Determinants: Intimate Partner Violence Past Fear: Yes   • Social Determinants: Intimate Partner Violence Current Fear: No          REVIEW OF SYSTEMS:  A complete review of systems was performed and found to be negative except for what was stated in the HPI (History of Present Illness).    PHYSICAL EXAM:  Vitals:    Visit Vitals  /84 (BP Location: LFA - Left forearm, Patient Position: Semi-Chatman's)   Pulse (!) 102   Temp 98.3 °F (36.8 °C) (Oral)   Resp 18   Ht 5' 4\" (1.626 m)   Wt 128 kg (282 lb 3 oz)   LMP  (LMP Unknown) Comment: Patient is on Depo injection and does not get periods   SpO2 98%   BMI 48.44 kg/m²        Constitutional:  Alert and oriented x3, patient is in no acute distress.  Skin:  No jaundice on inspection.  Skin is warm and dry on palpation.  Eyes:  Normal conjunctivae and lids, sclerae anicteric.   Pulmonary:  Clear to percussion and auscultation.   Cardiovascular:  Regular rate and rhythm on auscultation.  No lower extremity edema.  Abdomen:  Soft, non-tender, no distention, positive bowel sound, no hepatosplenomegaly.  Rectal:  not done  Musculoskeletal:  Patient moving all 4 extremities appropriately.   Neurologic:  Deep tendon reflexes are normal. EOMI's are intact.   Psychiatric:  Mood and affect are normal.    Physical exam updated 2/5/2023  LABS:  Personally reviewed    Admission on 02/05/2023   Component Date Value   • Sodium 02/05/2023 145    • Potassium 02/05/2023 3.2 (A)    • Chloride 02/05/2023 109    • Carbon Dioxide 02/05/2023 24    • Anion Gap 02/05/2023 15    • Glucose 02/05/2023 112 (A)    • BUN  02/05/2023 7    • Creatinine 02/05/2023 0.70    • Glomerular Filtration Ra* 02/05/2023 >90    • BUN/ Creatinine Ratio 02/05/2023 10    • Calcium 02/05/2023 9.5    • Bilirubin, Total 02/05/2023 3.5 (A)    • GOT/AST 02/05/2023 500 (A)    • GPT/ALT 02/05/2023 324 (A)    • Alkaline Phosphatase 02/05/2023 198 (A)    • Albumin 02/05/2023 3.6    • Protein, Total 02/05/2023 7.9    • Globulin 02/05/2023 4.3 (A)    • A/G Ratio 02/05/2023 0.8 (A)    • Prothrombin Time 02/05/2023 10.8    • INR 02/05/2023 1.1    • PTT 02/05/2023 35 (A)    • Magnesium 02/05/2023 2.3    • Phosphorus 02/05/2023 3.5    • Lactate, Venous 02/05/2023 1.2    • WBC 02/05/2023 11.1 (A)    • RBC 02/05/2023 4.66    • HGB 02/05/2023 11.4 (A)    • HCT 02/05/2023 36.6    • MCV 02/05/2023 78.5    • MCH 02/05/2023 24.5 (A)    • MCHC 02/05/2023 31.1 (A)    • PLT 02/05/2023 579 (A)    • RDW-CV 02/05/2023 20.6 (A)    • RDW-SD 02/05/2023 57.1 (A)    • NRBC 02/05/2023 0    • Ventricular Rate EKG/Min* 02/05/2023 104    • Atrial Rate (BPM) 02/05/2023 104    • NM-Interval (MSEC) 02/05/2023 150    • QRS-Interval (MSEC) 02/05/2023 86    • QT-Interval (MSEC) 02/05/2023 302    • QTc 02/05/2023 397    • P Axis (Degrees) 02/05/2023 37    • R Axis (Degrees) 02/05/2023 10    • T Axis (Degrees) 02/05/2023 50    • REPORT TEXT 02/05/2023                      Value:Sinus tachycardia  Nonspecific T wave abnormality  Abnormal ECG  When compared with ECG of  15-OCT-2021 22:44,  Nonspecific T wave abnormality, worse in  Anterolateral leads  QT has shortened     • Culture, Blood or Bone M* 02/05/2023 No Growth <24 hours    • Culture, Blood or Bone M* 02/05/2023 No Growth <24 hours    • COLOR, URINALYSIS 02/05/2023 Lurdes    • APPEARANCE, URINALYSIS 02/05/2023 Clear    • GLUCOSE, URINALYSIS 02/05/2023 Negative    • BILIRUBIN, URINALYSIS 02/05/2023 Positive (A)    • KETONES, URINALYSIS 02/05/2023 Negative    • SPECIFIC GRAVITY, URINAL* 02/05/2023 1.017    • OCCULT BLOOD, URINALYSIS  02/05/2023 Negative    • PH, URINALYSIS 02/05/2023 5.5    • PROTEIN, URINALYSIS 02/05/2023 Negative    • UROBILINOGEN, URINALYSIS 02/05/2023 0.2    • NITRITE, URINALYSIS 02/05/2023 Negative    • LEUKOCYTE ESTERASE, URIN* 02/05/2023 Trace (A)    • SQUAMOUS EPITHELIAL, URI* 02/05/2023 6 to 10 (A)    • ERYTHROCYTES, URINALYSIS 02/05/2023 1 to 2    • LEUKOCYTES, URINALYSIS 02/05/2023 1 to 5    • BACTERIA, URINALYSIS 02/05/2023 Few (A)    • HYALINE CASTS, URINALYSIS 02/05/2023 None Seen    • MUCUS 02/05/2023 Present    • GLUCOSE, BEDSIDE - POINT* 02/05/2023 107 (A)    Admission on 02/04/2023, Discharged on 02/05/2023   Component Date Value   • Sodium 02/04/2023 144    • Potassium 02/04/2023 3.8    • Chloride 02/04/2023 105    • Carbon Dioxide 02/04/2023 26    • Anion Gap 02/04/2023 17    • Glucose 02/04/2023 98    • BUN 02/04/2023 8    • Creatinine 02/04/2023 0.62    • Glomerular Filtration Ra* 02/04/2023 >90    • BUN/ Creatinine Ratio 02/04/2023 13    • Calcium 02/04/2023 9.5    • Bilirubin, Total 02/04/2023 2.7 (A)    • GOT/AST 02/04/2023 616 (A)    • GPT/ALT 02/04/2023 365 (A)    • Alkaline Phosphatase 02/04/2023 198 (A)    • Albumin 02/04/2023 3.8    • Protein, Total 02/04/2023 7.5    • Globulin 02/04/2023 3.7    • A/G Ratio 02/04/2023 1.0    • Prothrombin Time 02/04/2023 10.6    • INR 02/04/2023 1.0    • PTT 02/04/2023 31 (A)    • Magnesium 02/04/2023 2.0    • ABO/RH(D) 02/04/2023 O Rh Positive    • ANTIBODY SCREEN 02/04/2023 Negative    • TYPE AND SCREEN EXPIRATI* 02/04/2023 02/07/2023 23:59    • WBC 02/04/2023 11.6 (A)    • RBC 02/04/2023 4.85    • HGB 02/04/2023 11.9 (A)    • HCT 02/04/2023 37.8    • MCV 02/04/2023 77.9 (A)    • MCH 02/04/2023 24.5 (A)    • MCHC 02/04/2023 31.5 (A)    • RDW-CV 02/04/2023 20.1 (A)    • RDW-SD 02/04/2023 55.9 (A)    • PLT 02/04/2023 560 (A)    • NRBC 02/04/2023 0    • Neutrophil, Percent 02/04/2023 78    • Lymphocytes, Percent 02/04/2023 16    • Mono, Percent 02/04/2023 5    •  Eosinophils, Percent 02/04/2023 0    • Basophils, Percent 02/04/2023 0    • Immature Granulocytes 02/04/2023 1    • Absolute Neutrophils 02/04/2023 9.0 (A)    • Absolute Lymphocytes 02/04/2023 1.8    • Absolute Monocytes 02/04/2023 0.6    • Absolute Eosinophils  02/04/2023 0.0    • Absolute Basophils 02/04/2023 0.0    • Absolute Immmature Granu* 02/04/2023 0.1    • HCG, Qualitative 02/04/2023 Negative    • Lipase 02/04/2023 138    • HGB 02/04/2023 11.1 (A)    • HCT 02/04/2023 35.6 (A)    • Rapid SARS-COV-2 by PCR 02/05/2023 Not Detected    • Isolation Guidelines 02/05/2023     • Procedural Comment 02/05/2023     Lab Services on 02/03/2023   Component Date Value   • TSH 02/03/2023 1.536    • T4, Free 02/03/2023 1.3    Admission on 01/28/2023, Discharged on 01/29/2023   Component Date Value   • Sodium 01/28/2023 142    • Potassium 01/28/2023 3.5    • Chloride 01/28/2023 107    • Carbon Dioxide 01/28/2023 24    • Anion Gap 01/28/2023 15    • Glucose 01/28/2023 101 (A)    • BUN 01/28/2023 11    • Creatinine 01/28/2023 0.74    • Glomerular Filtration Ra* 01/28/2023 >90    • BUN/ Creatinine Ratio 01/28/2023 15    • Calcium 01/28/2023 9.5    • Bilirubin, Total 01/28/2023 0.2    • GOT/AST 01/28/2023 12    • GPT/ALT 01/28/2023 19    • Alkaline Phosphatase 01/28/2023 84    • Albumin 01/28/2023 3.3 (A)    • Protein, Total 01/28/2023 7.6    • Globulin 01/28/2023 4.3 (A)    • A/G Ratio 01/28/2023 0.8 (A)    • WBC 01/28/2023 12.8 (A)    • RBC 01/28/2023 4.58    • HGB 01/28/2023 11.3 (A)    • HCT 01/28/2023 35.9 (A)    • MCV 01/28/2023 78.4    • MCH 01/28/2023 24.7 (A)    • MCHC 01/28/2023 31.5 (A)    • RDW-CV 01/28/2023 20.7 (A)    • RDW-SD 01/28/2023 57.2 (A)    • PLT 01/28/2023 498 (A)    • NRBC 01/28/2023 0    • Neutrophil, Percent 01/28/2023 75    • Lymphocytes, Percent 01/28/2023 19    • Mono, Percent 01/28/2023 5    • Eosinophils, Percent 01/28/2023 0    • Basophils, Percent 01/28/2023 0    • Immature Granulocytes  01/28/2023 1    • Absolute Neutrophils 01/28/2023 9.6 (A)    • Absolute Lymphocytes 01/28/2023 2.5    • Absolute Monocytes 01/28/2023 0.6    • Absolute Eosinophils  01/28/2023 0.0    • Absolute Basophils 01/28/2023 0.1    • Absolute Immmature Granu* 01/28/2023 0.1    • COLOR, URINALYSIS 01/29/2023 Straw    • APPEARANCE, URINALYSIS 01/29/2023 Clear    • GLUCOSE, URINALYSIS 01/29/2023 Negative    • BILIRUBIN, URINALYSIS 01/29/2023 Negative    • KETONES, URINALYSIS 01/29/2023 Negative    • SPECIFIC GRAVITY, URINAL* 01/29/2023 1.009    • OCCULT BLOOD, URINALYSIS 01/29/2023 Negative    • PH, URINALYSIS 01/29/2023 6.0    • PROTEIN, URINALYSIS 01/29/2023 Negative    • UROBILINOGEN, URINALYSIS 01/29/2023 0.2    • NITRITE, URINALYSIS 01/29/2023 Negative    • LEUKOCYTE ESTERASE, URIN* 01/29/2023 Negative    • Lipase 01/28/2023 136    • Pregnancy, Urine 01/29/2023 Negative          ASSESSMENT and PLAN:  No problem-specific Assessment & Plan notes found for this encounter.    1. Choledocholithiasis noted on imaging  2. Right upper quadrant, Epigastric pain, nausea, vomiting with evidence of cholecystitis on imaging, lipase normal no evidence to suggest pancreatitis.  3. Morbid obesity  4. Gastroesophageal reflux disease  5. Crohns disease reported history-currently seems less likely, reported years ago she was told about Crohns however when she stopped her metformin her diarrhea resolved. Has not had any issues since. Has not had a colonoscopy for years per patient. No diarrhea or symptoms to suggest IBD currently       · Plan for ERCP today   · Continue NPO status  · Verify informed consent  · PPI therapy  · Continue antibiotics  · Will likely need surgery evaluation for cholecystectomy during this admission        Orders Placed This Encounter   • XR CHEST AP OR PA   • XR Chest AP or PA   • CBC No Differential   • Comprehensive Metabolic Panel   • Prothrombin Time   • Partial Thromboplastin Time   • Magnesium   • Phosphorus    • Lactic Acid, Venous   • CBC No Differential   • Hemoglobin   • Potassium   • Magnesium   • Blood Gas, Arterial   • Urinalysis & Reflex Microscopy With Culture If Indicated   • Electrocardiogram 12-Lead   • Electrocardiogram 12-Lead   • Electrocardiogram 12-Lead   • Full Resuscitation   • Inpatient consult to GI   • Oxygen Therapy PRN greater than 90%   • Oxygen Therapy PRN greater than or equal to 92%   • Blood Gas - Draw:   • Insert IV   • Admit to Inpatient   • Blood Culture   • Blood Culture   • Urine, Bacterial Culture   • NPO Diet with Exceptions; Medications   • Vital Signs   • Pulse Oximetry   • Notify: per Routine Standards   • Notify: Metered blood glucose   • Activity   • MED IP Monitored Bed Orders Telemetry order set (PPO #8504)   • IF SYMPTOMATIC HYPOTENSION (SBP less than 90 mmHg, unrelated to cardiac arrhythmia)   • ACLS Guidelines   • No VTE/DVT Pharmacologic Prophylaxis Needed   • Below knee (BK) anti-embolism stockings (TEDs)   • Intermittent Pneumatic Sequential Compression Device (SCDs)   • Telemetry monitoring   • Sitter at bedside   • Monitor capillary blood glucose   • Document the time of reaction   • For signs of hypoglycemia, do STAT capillary or blood glucose   • For treatment if patient awake, responsive, able to take po orals and blood glucose is 40-70: Administer 15 g of carbohydrate oral as one of the following: 15 g of glucose gel (1 tube) OR 4 ounces of juice. Repeat oral carbohydrate until blood glucose is greater than 70 mg/dL.   • For treatment if patient awake, responsive, able to take po orals and blood glucose is <40: Administer 30 g of carbohydrate oral as one of the followin g of glucose gel (2 tubes) OR 8 ounces of juice. Repeat oral carbohydrate until blood glucose is greater than 70mg/dL.   • Metered blood glucose   • Metered Blood Glucose   • Metered blood glucose PRN   • Metered Blood Glucose every 6 hours   • GLUCOSE, BEDSIDE - POINT OF CARE   • pantoprazole  (PROTONIX) 80 mg/100mL in sodium chloride 0.9% infusion   • piperacillin-tazobactam (ZOSYN) 3.375 g in sodium chloride 0.9 % 100 mL IVPB   • sodium chloride 0.9 % flush bag 25 mL   • sodium chloride (PF) 0.9 % injection 2 mL   • sodium chloride (NORMAL SALINE) 0.9 % bolus 500 mL   • sodium chloride 0.9% infusion   • dextrose 50 % injection 25 g   • dextrose 50 % injection 12.5 g   • glucagon (GLUCAGEN) injection 1 mg   • dextrose (GLUTOSE) 40 % gel 15 g   • dextrose (GLUTOSE) 40 % gel 30 g   • insulin lispro (ADMELOG,HumaLOG) - Correction Dose   • HYDROmorphone (DILAUDID) injection 0.4 mg       Lowell Samayoa MD    2/5/2023    8:08 AM    Pager # [phone]   86 F from home, ambulates with walker at baseline PMH HLD, HTN, anemia, p/w c/o buttock pain s/p mechanical fall 2 weeks ago. Pt denies any preceding symptomology and ROS is otherwise negative except for b/l buttock throbbing/sharp pain, difficulty with ambulation and inability to sit without pain. Pt denies paresthesias, bowel/bladder incontinence, head trauma. In ED, pt is well-appearing, with stable vitals, SLR is negative, XR preliminary read does not appear to show fracture.     pt seen in bed, vitals stable, physical exam reveals lungs clear heart s1s2 abd soft nd nt bs+ ext no edema upper and lower ext strength R=L. labs and diagnostic test result reviewed.    assessment -- mechanical fall 2nd to lower ext weakness, fracture ruled out, back pain radiating to buttock likely sciatica h/o HLD, HTN, anemia    plan -- admit to medicine, pain control, cont preadmit home meds, gi and dvt profilaxis,  cbc, bmp, mg, phos, lipids, tsh, ua      PT evaluation  neuro cons

## 2023-03-13 NOTE — PHYSICAL THERAPY INITIAL EVALUATION ADULT - THERAPY FREQUENCY, PT EVAL
We are committed to providing you with the best care possible. In order to help us achieve these goals please remember to bring all medications, herbal products, and over the counter supplements with you to each visit. If your provider has ordered testing for you, please be sure to follow up with our office if you have not received results within 7 days after the testing took place. *If you receive a survey after visiting one of our offices, please take time to share your experience concerning your physician office visit. These surveys are confidential and no health information about you is shared. We are eager to improve for you and we are counting on your feedback to help make that happen. FOOD RESOURCES    COMMUNITY MICHELLE:  [facility] / [facility] [place]:  The [employer] [facility] [address], [place], [address]  Phone [phone]  Serving a free hot meal from 11:00am to 1:00pm Monday through Friday. In addition to receiving a meal, this location offers help for other needs, including showers, a laundry facility, and . Donations are welcomed. [facility]  [address], [place], [address]  Phone [phone]  Provides a hot meal Monday through Friday for lunch. Individuals can eat at the Center. Transportation is provided. Hot meals can be delivered to shut-ins. Anyone receiving a delivered meal will need to provide [facility] with dietary instructions from his/her Primary Care Provider or . Donations are welcomed at the Center. A small charge is applied to meals delivered to cover cost of gas. [facility]  [address], [place], [address]  Phone [phone]  Serving a free lunch. Everyone is welcome. Please call Janice Winchester to ask about hours of operation and if meal delivery is an option.      Briana Woodard / Yuko Day for the [employer] [phone]  Serves a hot dinner from 4:00pm to 6:00pm, Monday through Friday. SENIOR CITIZENS CENTERS:  Meals are available to individuals who are 60 years and older. PURCHASE AREA  The [address] [place] [address] (Lists of hospitals in the [place]) provides Meals-On-Wheels to Seniors who are unable to go to a [employer] for a hot meal.  Cost for home delivery varies by [facility] [place]. Please call the Lists of hospitals in the [place] Office to get more information, [phone], or call your local [facility] [place]. [facility] CHILDREN'S [place]  [address], P[address] [facility], [address] [facility]  [phone]    [facility]  [address]  [phone]  [facility]  [address] [place] [address] Leslie, P.O. [facility] [address] [place] [address]  [phone]  CHILDREN'S [facility] Citizens of [address]  [phone]  [facility]  [address] [place] [address]  [phone]  [address] [place] [address]  No Phone  [employer] [place]  [address], 45 [facility] 173  [phone]  1436 [facility]/  225 [facility]  [address]  [phone]  [facility]  [address] [facility], [address] [place] [address] Road  [phone]  [facility] CHILD AND ADOLESCENT [facility]  Nutrition Site  [address], [place], [address]  [phone]  [address]  [address], [place], [address] [facility]  [phone]    [address] ([address]) provides Meals-On-Wheels to Seniors who are unable to go to a [facility] for a hot meal.  Cost for home delivery varies by [facility].   Please call the [facility] Office to get more information, [phone] / Toll Free [phone], or call your local [facility] [place]. [facility]  [address]  [address], [place], [address]  [phone]    [address]  [address] [facility]  [address], [place], [address]  [phone]    [facility] CENTER[address], 8220 [facility]  [phone] 1968 [facility], [facility], [address]  [phone]    VIA [facility], [facility]  [phone]    [facility]  [address] [facility]  [address] [facility], [address]  [phone]    [facility]  [address]  P[address] [facility], [address]  [phone]    C/ [address]  [address] Mar[address]kes  [address] [facility], [address] [place] [address]  [address]  [address] [facility], [facility], [facility] [facility] [address] [facility]  [phone]    [facility]  Parv Chacha [address]  [address]  [phone] 6901 [facility] Alice / Bessy Montesinos:  [facility] / [facility] [phone]  [employer] [phone]  [facility] [address] [facility] / [facility] [address] [facility] [address] [phone]  [address] / [address] [phone]   [facility] [phone]  [address] [phone] ([facility] [phone])  [facility] / [address] [place] [address] [address] [place] [address]  [address] [phone]  [place]  [facility] [phone]   [employer] [phone] 3-5x/week

## 2023-05-04 NOTE — PHYSICAL THERAPY INITIAL EVALUATION ADULT - IMPAIRED TRANSFERS: SIT/STAND, REHAB EVAL
Mid-Level Procedure Text (C): After obtaining clear surgical margins the patient was sent to a mid-level provider for surgical repair.  The patient understands they will receive post-surgical care and follow-up from the mid-level provider. decreased strength/impaired balance

## 2023-08-11 NOTE — PATIENT PROFILE ADULT - FUNCTIONAL ASSESSMENT - BASIC MOBILITY 2.
Per patient she still have refill on her Omeprazole but she would like to transfer it to her mail in pharmacy [facility].     Current Outpatient Medications   Medication Sig Dispense Refill                                       omeprazole 20 MG Oral Capsule Delayed Release Take 1 capsule (20 mg total) by mouth before breakfast. 90 capsule 3 3 = A little assistance

## 2023-10-09 NOTE — H&P ADULT - ATTENDING SUPERVISION STATEMENT
Resident Azelaic Acid Pregnancy And Lactation Text: This medication is considered safe during pregnancy and breast feeding.

## 2024-01-29 NOTE — ED ADULT NURSE NOTE - NSFALLRSKASSESASSIST_ED_ALL_ED
Called Pt. Spoke to patient's spouse, and advised on results.   Hold warfarin for 2 days and decrease to 6mg daily and recheck 1 week          yes

## 2024-10-08 NOTE — PATIENT PROFILE ADULT. - PAIN LOCATION, PROFILE
11/7 at 11 or 2, just let me know which time she chooses so I can schedule it.    lower back/coccyx area, b/l buttocks upon movement and ambulation
